# Patient Record
Sex: FEMALE | Race: WHITE | NOT HISPANIC OR LATINO | Employment: OTHER | URBAN - METROPOLITAN AREA
[De-identification: names, ages, dates, MRNs, and addresses within clinical notes are randomized per-mention and may not be internally consistent; named-entity substitution may affect disease eponyms.]

---

## 2018-03-17 ENCOUNTER — OFFICE VISIT (OUTPATIENT)
Dept: URGENT CARE | Facility: MEDICAL CENTER | Age: 78
End: 2018-03-17
Payer: COMMERCIAL

## 2018-03-17 VITALS
SYSTOLIC BLOOD PRESSURE: 170 MMHG | OXYGEN SATURATION: 94 % | DIASTOLIC BLOOD PRESSURE: 89 MMHG | HEIGHT: 70 IN | BODY MASS INDEX: 25.48 KG/M2 | HEART RATE: 63 BPM | TEMPERATURE: 99.6 F | WEIGHT: 178 LBS
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
## 2018-03-17 DIAGNOSIS — S39.012A STRAIN OF MUSCLE, FASCIA AND TENDON OF LOWER BACK, INITIAL ENCOUNTER: Primary | ICD-10-CM

## 2018-03-17 PROCEDURE — 99283 EMERGENCY DEPT VISIT LOW MDM: CPT | Performed by: PHYSICIAN ASSISTANT

## 2018-03-17 PROCEDURE — G0382 LEV 3 HOSP TYPE B ED VISIT: HCPCS | Performed by: PHYSICIAN ASSISTANT

## 2018-03-17 RX ORDER — QUINAPRIL 10 MG/1
10 TABLET ORAL
COMMUNITY
End: 2022-02-04

## 2018-03-17 RX ORDER — PROPRANOLOL HYDROCHLORIDE 40 MG/1
40 TABLET ORAL 3 TIMES DAILY
COMMUNITY
End: 2022-02-04

## 2018-03-17 RX ORDER — DILTIAZEM HYDROCHLORIDE 360 MG/1
360 CAPSULE, EXTENDED RELEASE ORAL DAILY
Status: ON HOLD | COMMUNITY
End: 2022-02-07

## 2018-03-17 RX ORDER — HYDROCHLOROTHIAZIDE 25 MG/1
25 TABLET ORAL DAILY
COMMUNITY
End: 2022-02-04

## 2018-03-17 NOTE — PROGRESS NOTES
[address] [facility]        NAME: Brooke Packer is a 65 y o  female  : 1940    MRN: [id number]  DATE: 2018  TIME: 1:26 PM    Assessment and Plan   Strain of muscle, fascia and tendon of lower back, initial encounter [U31 268S]  1  Strain of muscle, fascia and tendon of lower back, initial encounter     2  Fall, initial encounter       Discussed at length the need for xray  Based on physical exam findings of no TTP and history of worsening pain as time goes on, discussed that this is most likely a muscular strain  Patient prefers to forego imaging at this time as she believes there isn't anything broken  Will proceed with supportive therapy  Patient Instructions     Use ibuprofen and tylenol as needed for pain  Moist heat to the area for 15-20 minutes 3-4 times daily for relief of tense muscles  Gentle massage to the affected area as tolerated  Follow up with PCP in 3-5 days  Proceed to  ER if symptoms worsen  Chief Complaint     Chief Complaint   Patient presents with   [facility] Fall     While walking pt fell, and stumbled down two steps, has pain in R lower back          History of Present Illness       This is a 65year old female presenting with her daughter for low back pain after a fall yesterday  She states that she stumbled and fell down 3 stairs, denies hitting her head, losing consciousness, nausea, or vomiting since  She states that she had an ache in her back initially but over time the pain is getting worse  The pain is on the right side of her back, dull/achy constantly and then sharp pain intermittently when she moves the wrong way  She has a history of low back pain due to osteoarthritis in the lumbar spine  She denies any pain in her legs, weakness, tingling, numbness, incontinence, or saddle anesthesia  She took an aspirin for her pain today   She states that she is able to take ibuprofen and tylenol, no one has ever told her not to and she denies any liver, kidney, or stomach problems  She states that sitting makes her pain worse at times, walking is uncomfortable but she is able to walk  Fall   The accident occurred 12 to 24 hours ago  The fall occurred while walking  She fell from a height of 1 to 2 ft  There was no blood loss  The point of impact was the buttocks  The pain is present in the back  The symptoms are aggravated by sitting  Pertinent negatives include no abdominal pain, bowel incontinence, fever, headaches, hearing loss, hematuria, loss of consciousness, nausea, numbness, tingling, visual change or vomiting  Review of Systems   Review of Systems   Constitutional: Negative for chills, fatigue and fever  HENT: Negative  Respiratory: Negative  Cardiovascular: Negative  Gastrointestinal: Negative for abdominal pain, bowel incontinence, nausea and vomiting  Genitourinary: Negative for hematuria  Musculoskeletal: Positive for back pain, gait problem and myalgias  Negative for joint swelling, neck pain and neck stiffness  Neurological: Negative for dizziness, tingling, loss of consciousness, numbness and headaches           Current Medications       Current Outpatient Prescriptions:     diltiazem (TIAZAC) 360 MG 24 hr capsule, Take 360 mg by mouth daily, Disp: , Rfl:     hydrochlorothiazide (HYDRODIURIL) 25 mg tablet, Take 25 mg by mouth daily, Disp: , Rfl:     propranolol (INDERAL) 40 mg tablet, Take 40 mg by mouth 3 (three) times a day, Disp: , Rfl:     quinapril (ACCUPRIL) 10 mg tablet, Take 10 mg by mouth daily at bedtime, Disp: , Rfl:     Current Allergies     Allergies as of 03/17/2018    (No Known Allergies)            The following portions of the patient's history were reviewed and updated as appropriate: allergies, current medications, past family history, past medical history, past social history, past surgical history and problem list      Past Medical History:   Diagnosis Date    Arthritis     Back pain     Glaucoma     Hypertension     Tremors of nervous system        Past Surgical History:   Procedure Laterality Date    HYSTERECTOMY         No family history on file  Medications have been verified  Objective   /89   Pulse 63   Temp 99 6 °F (37 6 °C) (Temporal)   Ht 5' 10" (1 778 m)   Wt 80 7 kg (178 lb)   SpO2 94%   BMI 25 54 kg/m²        Physical Exam     Physical Exam   Constitutional: She appears well-developed and well-nourished  No distress  Patient walking with antalgic gait   HENT:   Head: Normocephalic and atraumatic  Mouth/Throat: Oropharynx is clear and moist    Eyes: Conjunctivae and EOM are normal  Pupils are equal, round, and reactive to light  Neck: Normal range of motion  Neck supple  Cardiovascular: Normal rate, regular rhythm, normal heart sounds and intact distal pulses  Pulmonary/Chest: Effort normal and breath sounds normal  No respiratory distress  She has no wheezes  She has no rales  Musculoskeletal:   Low back: There is an area of ecchymosis on the left side of the patient's low back that they believe is new  No midline tenderness to palpation, no TTP over the ecchymosis, no right sided TTP  The patient states that palpation of the right sided paraspinal muscles feels good  No TTP of either hip joints  Decreased lumbar range of motion in all directions  Equal strength in bilateral extremities  Cap refill less than 3 seconds in bilateral legs  Patient unable to lay flat on table for SLR  Lymphadenopathy:     She has no cervical adenopathy  Neurological: She is alert  She displays normal reflexes  She exhibits normal muscle tone  Coordination normal    Skin: Skin is warm and dry  She is not diaphoretic  Psychiatric: She has a normal mood and affect   Her behavior is normal  Judgment and thought content normal

## 2018-03-17 NOTE — PATIENT INSTRUCTIONS
Use ibuprofen and tylenol as needed for pain  Moist heat to the area for 15-20 minutes 3-4 times daily for relief of tense muscles  Gentle massage to the affected area as tolerated  Follow up with your PCP for symptoms lasting longer than 7-10 days, or worsening symptoms  Go to the ER for any distress

## 2020-11-05 ENCOUNTER — NURSING HOME VISIT (OUTPATIENT)
Dept: FAMILY MEDICINE CLINIC | Facility: CLINIC | Age: 80
End: 2020-11-05
Payer: COMMERCIAL

## 2020-11-05 VITALS
HEIGHT: 65 IN | SYSTOLIC BLOOD PRESSURE: 116 MMHG | TEMPERATURE: 96.5 F | WEIGHT: 122 LBS | HEART RATE: 82 BPM | BODY MASS INDEX: 20.33 KG/M2 | DIASTOLIC BLOOD PRESSURE: 68 MMHG

## 2020-11-05 DIAGNOSIS — H40.9 GLAUCOMA OF BOTH EYES, UNSPECIFIED GLAUCOMA TYPE: ICD-10-CM

## 2020-11-05 DIAGNOSIS — F02.80 ALZHEIMER'S DEMENTIA WITHOUT BEHAVIORAL DISTURBANCE, UNSPECIFIED TIMING OF DEMENTIA ONSET (HCC): ICD-10-CM

## 2020-11-05 DIAGNOSIS — Z00.00 MEDICARE ANNUAL WELLNESS VISIT, SUBSEQUENT: Primary | ICD-10-CM

## 2020-11-05 DIAGNOSIS — G30.9 ALZHEIMER'S DEMENTIA WITHOUT BEHAVIORAL DISTURBANCE, UNSPECIFIED TIMING OF DEMENTIA ONSET (HCC): ICD-10-CM

## 2020-11-05 PROCEDURE — 99213 OFFICE O/P EST LOW 20 MIN: CPT | Performed by: FAMILY MEDICINE

## 2020-11-05 PROCEDURE — G0439 PPPS, SUBSEQ VISIT: HCPCS | Performed by: FAMILY MEDICINE

## 2021-05-27 ENCOUNTER — NURSING HOME VISIT (OUTPATIENT)
Dept: FAMILY MEDICINE CLINIC | Facility: CLINIC | Age: 81
End: 2021-05-27
Payer: COMMERCIAL

## 2021-05-27 DIAGNOSIS — F02.80 ALZHEIMER'S DEMENTIA WITHOUT BEHAVIORAL DISTURBANCE, UNSPECIFIED TIMING OF DEMENTIA ONSET (HCC): Primary | ICD-10-CM

## 2021-05-27 DIAGNOSIS — G30.9 ALZHEIMER'S DEMENTIA WITHOUT BEHAVIORAL DISTURBANCE, UNSPECIFIED TIMING OF DEMENTIA ONSET (HCC): Primary | ICD-10-CM

## 2021-05-27 DIAGNOSIS — M54.50 CHRONIC BILATERAL LOW BACK PAIN WITHOUT SCIATICA: ICD-10-CM

## 2021-05-27 DIAGNOSIS — G89.29 CHRONIC BILATERAL LOW BACK PAIN WITHOUT SCIATICA: ICD-10-CM

## 2021-05-27 PROCEDURE — 99335 PR DOM/R-HOME E/M EST PT LW MOD SEVERITY 25 MINUTES: CPT | Performed by: FAMILY MEDICINE

## 2021-06-04 NOTE — PROGRESS NOTES
Falls Plan of Care: balance, strength, and gait training instructions were provided  Home safety education provided  Assessment/Plan:         Problem List Items Addressed This Visit        Nervous and Auditory    Alzheimer's dementia without behavioral disturbance ([facility] [address] ) - Primary     Patient is stable  and will continue present plan of care and reassess at next routine visit  All questions about this problem from patient were answered today  Other    Back pain     Will DC Tylenol  No  3 and started on tramadol 50 mg q i d  p r n  patient did not appear to be having any pain right now I saw her in the morning staff and power-of- says she has pain at night time  I discussed this with her power of  her daughter and she is in agreement with                 Subjective:      Patient ID: Hattie High is a 79 y o  female  This is an 54-year-old white female seen examined at the assisted living facility  Patient is pleasantly confused with her dementia however are reported from her daughter that she is having pain at night  Patient has chronic back pain and usually takes Tylenol 3 as needed  Will DC her Tylenol No  3 and will start her on tramadol 50 mg 4 times a day as needed  The following portions of the patient's history were reviewed and updated as appropriate:   Past Medical History:  She has a past medical history of Arthritis, Back pain, Glaucoma, Hypertension, and Tremors of nervous system  ,  _______________________________________________________________________  Medical Problems:  does not have any pertinent problems on file ,  _______________________________________________________________________  Past Surgical History:   has a past surgical history that includes Hysterectomy  ,  _______________________________________________________________________  Family History:  family history is not on file ,  _______________________________________________________________________  Social History:   reports that she has been smoking  She has never used smokeless tobacco  She reports previous alcohol use  She reports that she does not use drugs  ,  _______________________________________________________________________  Allergies:  has No Known Allergies     _______________________________________________________________________  Current Outpatient Medications   Medication Sig Dispense Refill    diltiazem (TIAZAC) 360 MG 24 hr capsule Take 360 mg by mouth daily      hydrochlorothiazide (HYDRODIURIL) 25 mg tablet Take 25 mg by mouth daily      propranolol (INDERAL) 40 mg tablet Take 40 mg by mouth 3 (three) times a day      quinapril (ACCUPRIL) 10 mg tablet Take 10 mg by mouth daily at bedtime       No current facility-administered medications for this visit       _______________________________________________________________________  Review of Systems   Constitutional: Negative for activity change, appetite change, fatigue and fever  HENT: Negative for congestion, ear pain, postnasal drip, rhinorrhea, sinus pressure, sinus pain, sneezing and sore throat  Eyes: Negative for pain and redness  Respiratory: Negative for apnea, cough, chest tightness, shortness of breath and wheezing  Cardiovascular: Negative for chest pain, palpitations and leg swelling  Gastrointestinal: Negative for abdominal pain, constipation, diarrhea, nausea and vomiting  Endocrine: Negative for cold intolerance and heat intolerance  Genitourinary: Negative for difficulty urinating, dysuria, frequency, hematuria and urgency  Musculoskeletal: Negative for arthralgias, gait problem and myalgias  Skin: Negative for rash  Neurological: Negative for dizziness, speech difficulty, weakness, numbness and headaches  Hematological: Does not bruise/bleed easily  Psychiatric/Behavioral: Positive for confusion   Negative for agitation and hallucinations  Objective: There were no vitals filed for this visit  There is no height or weight on file to calculate BMI  Physical Exam  Vitals signs and nursing note reviewed  Constitutional:       Appearance: She is well-developed  HENT:      Head: Normocephalic and atraumatic  Nose: Nose normal       Mouth/Throat:      Mouth: Mucous membranes are moist    Eyes:      General: No scleral icterus  Conjunctiva/sclera: Conjunctivae normal       Pupils: Pupils are equal, round, and reactive to light  Neck:      Musculoskeletal: Normal range of motion and neck supple  Thyroid: No thyromegaly  Cardiovascular:      Rate and Rhythm: Normal rate and regular rhythm  Pulmonary:      Effort: Pulmonary effort is normal       Breath sounds: Normal breath sounds  No wheezing  Abdominal:      General: Bowel sounds are normal  There is no distension  Palpations: Abdomen is soft  Tenderness: There is no abdominal tenderness  There is no guarding or rebound  Musculoskeletal: Normal range of motion  General: No tenderness or deformity  Skin:     General: Skin is warm and dry  Findings: No erythema or rash  Neurological:      Mental Status: She is alert  Mental status is at baseline  She is disoriented  Sensory: No sensory deficit     Psychiatric:         Behavior: Behavior normal

## 2021-06-04 NOTE — ASSESSMENT & PLAN NOTE
Gabriel WAGNER Tylenol  No  3 and started on tramadol 50 mg q i d  p r n  patient did not appear to be having any pain right now I saw her in the morning staff and power-of- says she has pain at night time    I discussed this with her power of  her daughter and she is in agreement with

## 2021-07-31 ENCOUNTER — TELEPHONE (OUTPATIENT)
Dept: OTHER | Facility: OTHER | Age: 81
End: 2021-07-31

## 2021-07-31 NOTE — TELEPHONE ENCOUNTER
Julia from [facility] called requesting a call back from on call provider regarding pt's labs results

## 2021-07-31 NOTE — TELEPHONE ENCOUNTER
Tech from [facility] calling in Stat lab results, stated "UA with microscopic, level out of range is blood at 0 03-0 19, results will also be faxed to the office"  Information relayed to on call provider via TC

## 2022-02-04 ENCOUNTER — APPOINTMENT (EMERGENCY)
Dept: RADIOLOGY | Facility: HOSPITAL | Age: 82
End: 2022-02-04
Payer: COMMERCIAL

## 2022-02-04 ENCOUNTER — HOSPITAL ENCOUNTER (INPATIENT)
Facility: HOSPITAL | Age: 82
LOS: 6 days | Discharge: NURSING, REHAB OR LONG-TERM CARE | DRG: 481 | End: 2022-02-10
Attending: STUDENT IN AN ORGANIZED HEALTH CARE EDUCATION/TRAINING PROGRAM | Admitting: HOSPITALIST
Payer: COMMERCIAL

## 2022-02-04 ENCOUNTER — HOSPITAL ENCOUNTER (EMERGENCY)
Facility: HOSPITAL | Age: 82
End: 2022-02-04
Attending: INTERNAL MEDICINE | Admitting: INTERNAL MEDICINE
Payer: COMMERCIAL

## 2022-02-04 ENCOUNTER — APPOINTMENT (EMERGENCY)
Dept: CT IMAGING | Facility: HOSPITAL | Age: 82
End: 2022-02-04
Payer: COMMERCIAL

## 2022-02-04 VITALS
BODY MASS INDEX: 22.49 KG/M2 | OXYGEN SATURATION: 97 % | DIASTOLIC BLOOD PRESSURE: 74 MMHG | TEMPERATURE: 97.3 F | RESPIRATION RATE: 18 BRPM | HEIGHT: 65 IN | WEIGHT: 135 LBS | SYSTOLIC BLOOD PRESSURE: 161 MMHG | HEART RATE: 98 BPM

## 2022-02-04 DIAGNOSIS — Z01.818 PRE-OPERATIVE CLEARANCE: ICD-10-CM

## 2022-02-04 DIAGNOSIS — S72.001A CLOSED FRACTURE OF RIGHT HIP, INITIAL ENCOUNTER (HCC): ICD-10-CM

## 2022-02-04 DIAGNOSIS — I50.42 CHRONIC COMBINED SYSTOLIC AND DIASTOLIC CHF (CONGESTIVE HEART FAILURE) (HCC): ICD-10-CM

## 2022-02-04 DIAGNOSIS — N18.31 STAGE 3A CHRONIC KIDNEY DISEASE (HCC): ICD-10-CM

## 2022-02-04 DIAGNOSIS — S72.009A HIP FRACTURE (HCC): Primary | ICD-10-CM
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
## 2022-02-04 DIAGNOSIS — I10 ESSENTIAL HYPERTENSION: ICD-10-CM

## 2022-02-04 LAB
ABO GROUP BLD: NORMAL
ALBUMIN SERPL BCP-MCNC: 4.3 G/DL (ref 3.4–4.8)
ALP SERPL-CCNC: 76.8 U/L (ref 35–140)
ALT SERPL W P-5'-P-CCNC: 18 U/L (ref 5–54)
ANION GAP SERPL CALCULATED.3IONS-SCNC: 15 MMOL/L (ref 4–13)
AST SERPL W P-5'-P-CCNC: 97 U/L (ref 15–41)
ATRIAL RATE: 71 BPM
ATRIAL RATE: 72 BPM
BASOPHILS # BLD AUTO: 0.04 THOUSANDS/ΜL (ref 0–0.1)
BASOPHILS NFR BLD AUTO: 0 % (ref 0–1)
BILIRUB SERPL-MCNC: 1.07 MG/DL (ref 0.3–1.2)
BLD GP AB SCN SERPL QL: NEGATIVE
BUN SERPL-MCNC: 23 MG/DL (ref 6–20)
CALCIUM SERPL-MCNC: 9.9 MG/DL (ref 8.4–10.2)
CARDIAC TROPONIN I PNL SERPL HS: 17 NG/L (ref 8–18)
CHLORIDE SERPL-SCNC: 101 MMOL/L (ref 96–108)
CK SERPL-CCNC: 129 U/L (ref 38–234)
CO2 SERPL-SCNC: 21 MMOL/L (ref 22–33)
CREAT SERPL-MCNC: 1.36 MG/DL (ref 0.4–1.1)
EOSINOPHIL # BLD AUTO: 0.01 THOUSAND/ΜL (ref 0–0.61)
EOSINOPHIL NFR BLD AUTO: 0 % (ref 0–6)
ERYTHROCYTE [DISTWIDTH] IN BLOOD BY AUTOMATED COUNT: 13.2 % (ref 11.6–15.1)
FLUAV RNA RESP QL NAA+PROBE: NEGATIVE
FLUBV RNA RESP QL NAA+PROBE: NEGATIVE
GFR SERPL CREATININE-BSD FRML MDRD: 36 ML/MIN/1.73SQ M
GLUCOSE SERPL-MCNC: 160 MG/DL (ref 65–140)
HCT VFR BLD AUTO: 42.8 % (ref 34.8–46.1)
HGB BLD-MCNC: 14.5 G/DL (ref 11.5–15.4)
IMM GRANULOCYTES # BLD AUTO: 0.08 THOUSAND/UL (ref 0–0.2)
IMM GRANULOCYTES NFR BLD AUTO: 1 % (ref 0–2)
INR PPP: 1.03 (ref 0.84–1.19)
LYMPHOCYTES # BLD AUTO: 0.33 THOUSANDS/ΜL (ref 0.6–4.47)
LYMPHOCYTES NFR BLD AUTO: 2 % (ref 14–44)
MCH RBC QN AUTO: 30.3 PG (ref 26.8–34.3)
MCHC RBC AUTO-ENTMCNC: 33.9 G/DL (ref 31.4–37.4)
MCV RBC AUTO: 89 FL (ref 82–98)
MONOCYTES # BLD AUTO: 1.14 THOUSAND/ΜL (ref 0.17–1.22)
MONOCYTES NFR BLD AUTO: 8 % (ref 4–12)
NEUTROPHILS # BLD AUTO: 12.4 THOUSANDS/ΜL (ref 1.85–7.62)
NEUTS SEG NFR BLD AUTO: 89 % (ref 43–75)
NRBC BLD AUTO-RTO: 0 /100 WBCS
P AXIS: -31 DEGREES
P AXIS: 78 DEGREES
PLATELET # BLD AUTO: 221 THOUSANDS/UL (ref 149–390)
PMV BLD AUTO: 9.6 FL (ref 8.9–12.7)
POTASSIUM SERPL-SCNC: 3.5 MMOL/L (ref 3.5–5)
PR INTERVAL: 176 MS
PR INTERVAL: 177 MS
PROT SERPL-MCNC: 7.8 G/DL (ref 6.4–8.3)
PROTHROMBIN TIME: 13.4 SECONDS (ref 11.6–14.5)
QRS AXIS: -31 DEGREES
QRS AXIS: 103 DEGREES
QRSD INTERVAL: 100 MS
QRSD INTERVAL: 141 MS
QT INTERVAL: 405 MS
QT INTERVAL: 424 MS
QTC INTERVAL: 444 MS
QTC INTERVAL: 461 MS
RBC # BLD AUTO: 4.79 MILLION/UL (ref 3.81–5.12)
RH BLD: POSITIVE
RSV RNA RESP QL NAA+PROBE: NEGATIVE
SARS-COV-2 RNA RESP QL NAA+PROBE: NEGATIVE
SODIUM SERPL-SCNC: 137 MMOL/L (ref 133–145)
SPECIMEN EXPIRATION DATE: NORMAL
T WAVE AXIS: -34 DEGREES
T WAVE AXIS: 88 DEGREES
VENTRICULAR RATE: 71 BPM
VENTRICULAR RATE: 72 BPM
WBC # BLD AUTO: 14 THOUSAND/UL (ref 4.31–10.16)

## 2022-02-04 PROCEDURE — 96361 HYDRATE IV INFUSION ADD-ON: CPT

## 2022-02-04 PROCEDURE — 99285 EMERGENCY DEPT VISIT HI MDM: CPT

## 2022-02-04 PROCEDURE — 73552 X-RAY EXAM OF FEMUR 2/>: CPT

## 2022-02-04 PROCEDURE — 99222 1ST HOSP IP/OBS MODERATE 55: CPT | Performed by: HOSPITALIST

## 2022-02-04 PROCEDURE — 82550 ASSAY OF CK (CPK): CPT | Performed by: INTERNAL MEDICINE

## 2022-02-04 PROCEDURE — 96372 THER/PROPH/DIAG INJ SC/IM: CPT

## 2022-02-04 PROCEDURE — 70450 CT HEAD/BRAIN W/O DYE: CPT

## 2022-02-04 PROCEDURE — 96375 TX/PRO/DX INJ NEW DRUG ADDON: CPT

## 2022-02-04 PROCEDURE — 73502 X-RAY EXAM HIP UNI 2-3 VIEWS: CPT

## 2022-02-04 PROCEDURE — G1004 CDSM NDSC: HCPCS

## 2022-02-04 PROCEDURE — 93005 ELECTROCARDIOGRAM TRACING: CPT

## 2022-02-04 PROCEDURE — 99285 EMERGENCY DEPT VISIT HI MDM: CPT | Performed by: INTERNAL MEDICINE

## 2022-02-04 PROCEDURE — 36415 COLL VENOUS BLD VENIPUNCTURE: CPT | Performed by: INTERNAL MEDICINE

## 2022-02-04 PROCEDURE — 86900 BLOOD TYPING SEROLOGIC ABO: CPT | Performed by: INTERNAL MEDICINE

## 2022-02-04 PROCEDURE — 71045 X-RAY EXAM CHEST 1 VIEW: CPT

## 2022-02-04 PROCEDURE — 86850 RBC ANTIBODY SCREEN: CPT | Performed by: INTERNAL MEDICINE

## 2022-02-04 PROCEDURE — 86901 BLOOD TYPING SEROLOGIC RH(D): CPT | Performed by: INTERNAL MEDICINE

## 2022-02-04 PROCEDURE — 0241U HB NFCT DS VIR RESP RNA 4 TRGT: CPT | Performed by: INTERNAL MEDICINE

## 2022-02-04 PROCEDURE — 96374 THER/PROPH/DIAG INJ IV PUSH: CPT

## 2022-02-04 PROCEDURE — 80053 COMPREHEN METABOLIC PANEL: CPT | Performed by: INTERNAL MEDICINE

## 2022-02-04 PROCEDURE — 93010 ELECTROCARDIOGRAM REPORT: CPT | Performed by: INTERNAL MEDICINE

## 2022-02-04 PROCEDURE — 85025 COMPLETE CBC W/AUTO DIFF WBC: CPT | Performed by: INTERNAL MEDICINE

## 2022-02-04 PROCEDURE — 84484 ASSAY OF TROPONIN QUANT: CPT | Performed by: INTERNAL MEDICINE

## 2022-02-04 PROCEDURE — 85610 PROTHROMBIN TIME: CPT | Performed by: INTERNAL MEDICINE

## 2022-02-04 RX ORDER — HYDRALAZINE HYDROCHLORIDE 20 MG/ML
5 INJECTION INTRAMUSCULAR; INTRAVENOUS EVERY 4 HOURS PRN
Status: DISCONTINUED | OUTPATIENT
Start: 2022-02-04 | End: 2022-02-10 | Stop reason: HOSPADM

## 2022-02-04 RX ORDER — METOPROLOL SUCCINATE 25 MG/1
25 TABLET, EXTENDED RELEASE ORAL DAILY
Status: DISCONTINUED | OUTPATIENT
Start: 2022-02-05 | End: 2022-02-10 | Stop reason: HOSPADM

## 2022-02-04 RX ORDER — SODIUM CHLORIDE 9 MG/ML
125 INJECTION, SOLUTION INTRAVENOUS CONTINUOUS
Status: DISCONTINUED | OUTPATIENT
Start: 2022-02-04 | End: 2022-02-04 | Stop reason: HOSPADM

## 2022-02-04 RX ORDER — AMLODIPINE BESYLATE 2.5 MG/1
2.5 TABLET ORAL DAILY
COMMUNITY
End: 2022-02-10 | Stop reason: HOSPADM

## 2022-02-04 RX ORDER — DONEPEZIL HYDROCHLORIDE 5 MG/1
10 TABLET, FILM COATED ORAL
Status: DISCONTINUED | OUTPATIENT
Start: 2022-02-04 | End: 2022-02-10 | Stop reason: HOSPADM

## 2022-02-04 RX ORDER — FENTANYL CITRATE 50 UG/ML
1 INJECTION, SOLUTION INTRAMUSCULAR; INTRAVENOUS ONCE
Status: COMPLETED | OUTPATIENT
Start: 2022-02-04 | End: 2022-02-04

## 2022-02-04 RX ORDER — FUROSEMIDE 20 MG/1
20 TABLET ORAL DAILY
COMMUNITY
End: 2022-02-10 | Stop reason: HOSPADM

## 2022-02-04 RX ORDER — DILTIAZEM HYDROCHLORIDE 180 MG/1
360 CAPSULE, COATED, EXTENDED RELEASE ORAL DAILY
Status: DISCONTINUED | OUTPATIENT
Start: 2022-02-05 | End: 2022-02-04

## 2022-02-04 RX ORDER — AMLODIPINE BESYLATE 2.5 MG/1
2.5 TABLET ORAL DAILY
Status: DISCONTINUED | OUTPATIENT
Start: 2022-02-05 | End: 2022-02-04

## 2022-02-04 RX ORDER — OXYCODONE HYDROCHLORIDE 5 MG/1
2.5 TABLET ORAL EVERY 4 HOURS PRN
Status: DISCONTINUED | OUTPATIENT
Start: 2022-02-04 | End: 2022-02-10 | Stop reason: HOSPADM

## 2022-02-04 RX ORDER — HALOPERIDOL 5 MG/ML
2.5 INJECTION INTRAMUSCULAR ONCE
Status: COMPLETED | OUTPATIENT
Start: 2022-02-04 | End: 2022-02-04

## 2022-02-04 RX ORDER — FENTANYL CITRATE 50 UG/ML
50 INJECTION, SOLUTION INTRAMUSCULAR; INTRAVENOUS ONCE
Status: COMPLETED | OUTPATIENT
Start: 2022-02-04 | End: 2022-02-04

## 2022-02-04 RX ORDER — ACETAMINOPHEN 325 MG/1
975 TABLET ORAL ONCE
Status: DISCONTINUED | OUTPATIENT
Start: 2022-02-04 | End: 2022-02-04 | Stop reason: HOSPADM

## 2022-02-04 RX ORDER — SODIUM CHLORIDE, SODIUM LACTATE, POTASSIUM CHLORIDE, CALCIUM CHLORIDE 600; 310; 30; 20 MG/100ML; MG/100ML; MG/100ML; MG/100ML
50 INJECTION, SOLUTION INTRAVENOUS CONTINUOUS
Status: DISCONTINUED | OUTPATIENT
Start: 2022-02-05 | End: 2022-02-05

## 2022-02-04 RX ORDER — POTASSIUM CHLORIDE 750 MG/1
10 TABLET, EXTENDED RELEASE ORAL DAILY
COMMUNITY
End: 2022-02-10 | Stop reason: HOSPADM

## 2022-02-04 RX ORDER — OXYCODONE HYDROCHLORIDE 5 MG/1
5 TABLET ORAL EVERY 4 HOURS PRN
Status: DISCONTINUED | OUTPATIENT
Start: 2022-02-04 | End: 2022-02-10 | Stop reason: HOSPADM

## 2022-02-04 RX ORDER — TIMOLOL MALEATE 5 MG/ML
1 SOLUTION/ DROPS OPHTHALMIC 2 TIMES DAILY
COMMUNITY

## 2022-02-04 RX ORDER — DONEPEZIL HYDROCHLORIDE 10 MG/1
10 TABLET, FILM COATED ORAL
COMMUNITY

## 2022-02-04 RX ORDER — OXYCODONE HYDROCHLORIDE 5 MG/1
5 TABLET ORAL EVERY 4 HOURS PRN
COMMUNITY
End: 2022-02-10 | Stop reason: HOSPADM

## 2022-02-04 RX ORDER — SERTRALINE HYDROCHLORIDE 100 MG/1
100 TABLET, FILM COATED ORAL DAILY
COMMUNITY

## 2022-02-04 RX ORDER — METOPROLOL SUCCINATE 25 MG/1
25 TABLET, EXTENDED RELEASE ORAL DAILY
COMMUNITY

## 2022-02-04 RX ORDER — HEPARIN SODIUM 5000 [USP'U]/ML
5000 INJECTION, SOLUTION INTRAVENOUS; SUBCUTANEOUS EVERY 8 HOURS SCHEDULED
Status: DISCONTINUED | OUTPATIENT
Start: 2022-02-04 | End: 2022-02-05 | Stop reason: SDUPTHER

## 2022-02-04 RX ORDER — LATANOPROST 50 UG/ML
1 SOLUTION/ DROPS OPHTHALMIC
Status: DISCONTINUED | OUTPATIENT
Start: 2022-02-04 | End: 2022-02-10 | Stop reason: HOSPADM

## 2022-02-04 RX ORDER — PANTOPRAZOLE SODIUM 40 MG/1
40 TABLET, DELAYED RELEASE ORAL DAILY
COMMUNITY

## 2022-02-04 RX ORDER — SERTRALINE HYDROCHLORIDE 100 MG/1
100 TABLET, FILM COATED ORAL DAILY
Status: DISCONTINUED | OUTPATIENT
Start: 2022-02-05 | End: 2022-02-10 | Stop reason: HOSPADM

## 2022-02-04 RX ORDER — PANTOPRAZOLE SODIUM 40 MG/1
40 TABLET, DELAYED RELEASE ORAL
Status: DISCONTINUED | OUTPATIENT
Start: 2022-02-05 | End: 2022-02-10 | Stop reason: HOSPADM

## 2022-02-04 RX ORDER — SACUBITRIL AND VALSARTAN 24; 26 MG/1; MG/1
1 TABLET, FILM COATED ORAL 2 TIMES DAILY
Status: ON HOLD | COMMUNITY
End: 2022-02-10 | Stop reason: SDUPTHER

## 2022-02-04 RX ORDER — POTASSIUM CHLORIDE 750 MG/1
10 TABLET, EXTENDED RELEASE ORAL 2 TIMES DAILY
Status: DISCONTINUED | OUTPATIENT
Start: 2022-02-04 | End: 2022-02-09

## 2022-02-04 RX ORDER — LATANOPROST 50 UG/ML
1 SOLUTION/ DROPS OPHTHALMIC
Status: ON HOLD | COMMUNITY
End: 2022-02-07

## 2022-02-04 RX ORDER — QUETIAPINE FUMARATE 25 MG/1
25 TABLET, FILM COATED ORAL 2 TIMES DAILY
COMMUNITY

## 2022-02-04 RX ORDER — QUETIAPINE FUMARATE 25 MG/1
25 TABLET, FILM COATED ORAL 2 TIMES DAILY
Status: DISCONTINUED | OUTPATIENT
Start: 2022-02-04 | End: 2022-02-10 | Stop reason: HOSPADM

## 2022-02-04 RX ORDER — TIMOLOL MALEATE 5 MG/ML
1 SOLUTION/ DROPS OPHTHALMIC 2 TIMES DAILY
Status: DISCONTINUED | OUTPATIENT
Start: 2022-02-04 | End: 2022-02-10 | Stop reason: HOSPADM

## 2022-02-04 RX ADMIN — QUETIAPINE FUMARATE 25 MG: 25 TABLET ORAL at 23:31

## 2022-02-04 RX ADMIN — LATANOPROST 1 DROP: 50 SOLUTION/ DROPS OPHTHALMIC at 23:30

## 2022-02-04 RX ADMIN — OXYCODONE HYDROCHLORIDE 5 MG: 5 TABLET ORAL at 23:31

## 2022-02-04 RX ADMIN — HYDRALAZINE HYDROCHLORIDE 5 MG: 20 INJECTION, SOLUTION INTRAMUSCULAR; INTRAVENOUS at 23:30

## 2022-02-04 RX ADMIN — HEPARIN SODIUM 5000 UNITS: 5000 INJECTION INTRAVENOUS; SUBCUTANEOUS at 23:30

## 2022-02-04 RX ADMIN — MORPHINE SULFATE 2 MG: 2 INJECTION, SOLUTION INTRAMUSCULAR; INTRAVENOUS at 10:52

## 2022-02-04 RX ADMIN — DONEPEZIL HYDROCHLORIDE 10 MG: 5 TABLET, FILM COATED ORAL at 23:30

## 2022-02-04 RX ADMIN — TIMOLOL MALEATE 1 DROP: 5 SOLUTION/ DROPS OPHTHALMIC at 23:30

## 2022-02-04 RX ADMIN — POTASSIUM CHLORIDE 10 MEQ: 750 TABLET, EXTENDED RELEASE ORAL at 23:31

## 2022-02-04 RX ADMIN — SODIUM CHLORIDE, SODIUM LACTATE, POTASSIUM CHLORIDE, AND CALCIUM CHLORIDE 50 ML/HR: .6; .31; .03; .02 INJECTION, SOLUTION INTRAVENOUS at 23:26

## 2022-02-04 RX ADMIN — SACUBITRIL AND VALSARTAN 1 TABLET: 24; 26 TABLET, FILM COATED ORAL at 23:29

## 2022-02-04 RX ADMIN — SODIUM CHLORIDE 125 ML/HR: 0.9 INJECTION, SOLUTION INTRAVENOUS at 10:52

## 2022-02-04 RX ADMIN — HALOPERIDOL LACTATE 2.5 MG: 5 INJECTION, SOLUTION INTRAMUSCULAR at 17:25

## 2022-02-04 RX ADMIN — FENTANYL CITRATE 50 MCG: 50 INJECTION INTRAMUSCULAR; INTRAVENOUS at 16:58

## 2022-02-04 NOTE — EMTALA/ACUTE CARE TRANSFER
[facility] EMERGENCY DEPARTMENT  [address] [facility] [address]  Dept: [phone]      EMTALA TRANSFER CONSENT    NAME Kamran Sal                                         1940                              MRN [id number]    I have been informed of my rights regarding examination, treatment, and transfer   by Dr Hannah Gonzalez MD    Benefits: Specialized equipment and/or services available at the receiving facility (Include comment)________________________    Risks: Potential for delay in receiving treatment,Potential deterioration of medical condition,Loss of IV,Increased discomfort during transfer,Possible worsening of condition or death during transfer      Transfer Request   I acknowledge that my medical condition has been evaluated and explained to me by the emergency department physician or other qualified medical person and/or my attending physician who has recommended and offered to me further medical examination and treatment  I understand the Hospital's obligation with respect to the treatment and stabilization of my emergency medical condition  I nevertheless request to be transferred  I release the Hospital, the doctor, and any other persons caring for me from all responsibility or liability for any injury or ill effects that may result from my transfer and agree to accept all responsibility for the consequences of my choice to transfer, rather than receive stabilizing treatment at the Hospital  I understand that because the transfer is my request, my insurance may not provide reimbursement for the services  The Hospital will assist and direct me and my family in how to make arrangements for transfer, but the hospital is not liable for any fees charged by the transport service    In spite of this understanding, I refuse to consent to further medical examination and treatment which has been offered to me, and request transfer to  [facility] [address] : Judy Christa  I authorize the performance of emergency medical procedures and treatments upon me in both transit and upon arrival at the receiving facility  Additionally, I authorize the release of any and all medical records to the receiving facility and request they be transported with me, if possible  I authorize the performance of emergency medical procedures and treatments upon me in both transit and upon arrival at the receiving facility  Additionally, I authorize the release of any and all medical records to the receiving facility and request they be transported with me, if possible  I understand that the safest mode of transportation during a medical emergency is an ambulance and that the Hospital advocates the use of this mode of transport  Risks of traveling to the receiving facility by car, including absence of medical control, life sustaining equipment, such as oxygen, and medical personnel has been explained to me and I fully understand them  (SALINAS CORRECT BOX BELOW)  [  ]  I consent to the stated transfer and to be transported by ambulance/helicopter  [  ]  I consent to the stated transfer, but refuse transportation by ambulance and accept full responsibility for my transportation by car  I understand the risks of non-ambulance transfers and I exonerate the Hospital and its staff from any deterioration in my condition that results from this refusal     X___________________________________________    DATE  22  TIME________  Signature of patient or legally responsible individual signing on patient behalf           RELATIONSHIP TO PATIENT_________________________          Provider Certification    NAME Estela Brambila                                        [facility] 1940                              MRN [id number]    A medical screening exam was performed on the above named patient    Based on the examination:    Condition Necessitating Transfer The primary encounter diagnosis was Fall, initial encounter  A diagnosis of Closed fracture of right hip, initial encounter [facility]) was also pertinent to this visit  Patient Condition: The patient has been stabilized such that within reasonable medical probability, no material deterioration of the patient condition or the condition of the unborn child(zachary) is likely to result from the transfer    Reason for Transfer: Level of Care needed not available at this facility    Transfer Requirements: [address]   · Space available and qualified personnel available for treatment as acknowledged by    · Agreed to accept transfer and to provide appropriate medical treatment as acknowledged by       Dr Robert Hernandez  · Appropriate medical records of the examination and treatment of the patient are provided at the time of transfer   [address] _______  · Transfer will be performed by qualified personnel from    and appropriate transfer equipment as required, including the use of necessary and appropriate life support measures      Provider Certification: I have examined the patient and explained the following risks and benefits of being transferred/refusing transfer to the patient/family:  General risk, such as traffic hazards, adverse weather conditions, rough terrain or turbulence, possible failure of equipment (including vehicle or aircraft), or consequences of actions of persons outside the control of the transport personnel,Unanticipated needs of medical equipment and personnel during transport,Risk of worsening condition,The possibility of a transport vehicle being unavailable      Based on these reasonable risks and benefits to the patient and/or the unborn child(zachary), and based upon the information available at the time of the patients examination, I certify that the medical benefits reasonably to be expected from the provision of appropriate medical treatments at another medical facility outweigh the increasing risks, if any, to the individuals medical condition, and in the case of labor to the unborn child, from effecting the transfer      X____________________________________________ DATE 02/04/22        TIME_______      ORIGINAL - SEND TO MEDICAL RECORDS   COPY - SEND WITH PATIENT DURING TRANSFER

## 2022-02-04 NOTE — ED PROVIDER NOTES
History  Chief Complaint   Patient presents with   Colleen Holland     arrived via [facility]  as per EMS pt is from a Assisted Living  seen by staff at [phone] last evening and when staff went to check on her for breakfast this am, she was found on the floor next to her couch  right leg shortened and rotated  Blood sugar noted to be 44 upon EMS arrival, given oral glucose and repeat BS was 218  This is an  79years old brought from assisted living at the patient was found on the floor next to the couch  According to the EMS they found have a blood sugar of 44 and patient was giving glucose was and blood sugar go up to 217  Patient is not diabetic and she does not take medication for diabetes  Patient is demented and cannot give any history  Patient has shortening of the right lower extremity was external rotation  Which is suspected that it may be having right hip fracture  Patient has no fever  Patient is awake alert but she is confused  Patient has history of hypertension, depression, cardiac history, glaucoma  We do know for how long patient was on the floor  And no other communication with the patient  Prior to Admission Medications   Prescriptions Last Dose Informant Patient Reported? Taking?    QUEtiapine (SEROquel) 25 mg tablet 2/3/2022 at Unknown time  Yes Yes   Sig: Take 25 mg by mouth 2 (two) times a day   amLODIPine (NORVASC) 2 5 mg tablet 2/3/2022 at Unknown time  Yes Yes   Sig: Take 2 5 mg by mouth daily   diltiazem (TIAZAC) 360 MG 24 hr capsule  Self Yes No   Sig: Take 360 mg by mouth daily   Patient not taking: Reported on 2/4/2022    donepezil (ARICEPT) 10 mg tablet 2/3/2022 at Unknown time  Yes Yes   Sig: Take 10 mg by mouth daily at bedtime   furosemide (LASIX) 20 mg tablet 2/3/2022 at Unknown time  Yes Yes   Sig: Take 20 mg by mouth 2 (two) times a day   latanoprost (XALATAN) 0 005 % ophthalmic solution 2/3/2022 at Unknown time  Yes Yes   Sig: Administer 1 drop to both eyes daily at bedtime   metoprolol succinate (TOPROL-XL) 25 mg 24 hr tablet 2/3/2022 at Unknown time  Yes Yes   Sig: Take 25 mg by mouth daily   oxyCODONE (ROXICODONE) 5 immediate release tablet 2/3/2022 at Unknown time  Yes Yes   Sig: Take 5 mg by mouth every 4 (four) hours as needed for moderate pain   pantoprazole (PROTONIX) 40 mg tablet 2/3/2022 at Unknown time  Yes Yes   Sig: Take 40 mg by mouth daily   potassium chloride (K-DUR,KLOR-CON) 10 mEq tablet 2/3/2022 at Unknown time  Yes Yes   Sig: Take 10 mEq by mouth 2 (two) times a day   sacubitril-valsartan (Entresto) 24-26 MG TABS 2/3/2022 at Unknown time  Yes Yes   Sig: Take 1 tablet by mouth 2 (two) times a day   sertraline (ZOLOFT) 100 mg tablet 2/3/2022 at Unknown time  Yes Yes   Sig: Take 100 mg by mouth daily   timolol (TIMOPTIC) 0 5 % ophthalmic solution 2/3/2022 at Unknown time  Yes Yes   Si drop 2 (two) times a day      Facility-Administered Medications: None       Past Medical History:   Diagnosis Date    Arthritis     Back pain     Dementia ([facility] [address] )     Glaucoma     Hypertension     Psychiatric disorder     depression    Tremors of nervous system        Past Surgical History:   Procedure Laterality Date    HYSTERECTOMY         History reviewed  No pertinent family history  I have reviewed and agree with the history as documented  E-Cigarette/Vaping    E-Cigarette Use Never User      E-Cigarette/Vaping Substances     Social History     Tobacco Use    Smoking status: Current Some Day Smoker     Types: Cigarettes    Smokeless tobacco: Never Used   Vaping Use    Vaping Use: Never used   Substance Use Topics    Alcohol use: Not Currently    Drug use: Never       Review of Systems   Unable to perform ROS: Dementia       Physical Exam  Physical Exam  Constitutional:       General: She is in acute distress  Appearance: Normal appearance  She is well-developed  She is not ill-appearing, toxic-appearing or diaphoretic     HENT:      Head: Normocephalic and atraumatic  Right Ear: Tympanic membrane and ear canal normal       Left Ear: Tympanic membrane and ear canal normal       Nose: Nose normal  No congestion or rhinorrhea  Mouth/Throat:      Mouth: Mucous membranes are moist       Pharynx: No oropharyngeal exudate or posterior oropharyngeal erythema  Eyes:      General: No scleral icterus  Right eye: No discharge  Left eye: No discharge  Extraocular Movements: Extraocular movements intact  Conjunctiva/sclera: Conjunctivae normal       Pupils: Pupils are equal, round, and reactive to light  Neck:      Vascular: No carotid bruit  Cardiovascular:      Rate and Rhythm: Normal rate and regular rhythm  Pulses: Normal pulses  Heart sounds: Normal heart sounds  No murmur heard  No friction rub  No gallop  Pulmonary:      Effort: Pulmonary effort is normal  No respiratory distress  Breath sounds: Normal breath sounds  No stridor  No wheezing, rhonchi or rales  Chest:      Chest wall: No tenderness  Abdominal:      General: Bowel sounds are normal  There is no distension  Palpations: Abdomen is soft  There is no mass  Tenderness: There is no abdominal tenderness  There is no right CVA tenderness, left CVA tenderness, guarding or rebound  Hernia: No hernia is present  Musculoskeletal:         General: Tenderness and signs of injury present  No swelling or deformity  Normal range of motion  Cervical back: Normal range of motion and neck supple  No rigidity or tenderness  Right lower leg: No edema  Left lower leg: No edema  Comments: Examination of the RLE shows shortening and external rotation  Patient has good femoral pulse  Sensation is intact  Cannot move her RLE  Lymphadenopathy:      Cervical: No cervical adenopathy  Skin:     General: Skin is warm and dry  Capillary Refill: Capillary refill takes less than 2 seconds     Neurological:      Mental Status: She is alert  Comments: CONFUSED   Psychiatric:         Behavior: Behavior normal          Vital Signs  ED Triage Vitals   Temperature Pulse Respirations Blood Pressure SpO2   02/04/22 0951 02/04/22 0951 02/04/22 0951 02/04/22 0951 02/04/22 0951   (!) 97 3 °F (36 3 °C) 73 20 (!) 173/81 98 %      Temp Source Heart Rate Source Patient Position - Orthostatic VS BP Location FiO2 (%)   02/04/22 0951 02/04/22 1204 02/04/22 1204 02/04/22 1204 --   Oral Monitor Lying Left arm       Pain Score       02/04/22 0951       No Pain           Vitals:    02/04/22 0951 02/04/22 1204 02/04/22 1535   BP: (!) 173/81 151/66 161/74   Pulse: 73 83 98   Patient Position - Orthostatic VS:  Lying Lying         Visual Acuity      ED Medications  Medications   fentanyl citrate (PF) (FOR EMS ONLY) 100 mcg/2 mL injection 100 mcg (0 mcg Does not apply Given to EMS 2/4/22 1006)   morphine injection 2 mg (2 mg Intravenous Given 2/4/22 1052)   fentanyl citrate (PF) 100 MCG/2ML 50 mcg (50 mcg Intravenous Given 2/4/22 1658)   haloperidol lactate (HALDOL) injection 2 5 mg (2 5 mg Intramuscular Given 2/4/22 1725)       Diagnostic Studies  Results Reviewed     Procedure Component Value Units Date/Time    COVID/FLU/RSV [[id number]]  (Normal) Collected: 02/04/22 1005    Lab Status: Final result Specimen: Nares from Nose Updated: 02/04/22 1051     SARS-CoV-2 Negative     INFLUENZA A PCR Negative     INFLUENZA B PCR Negative     RSV PCR Negative    Narrative:      FOR PEDIATRIC PATIENTS - copy/paste COVID Guidelines URL to browser: [email or web address]    SARS-CoV-2 assay is a Nucleic Acid Amplification assay intended for the  qualitative detection of nucleic acid from SARS-CoV-2 in nasopharyngeal  swabs  Results are for the presumptive identification of SARS-CoV-2 RNA      Positive results are indicative of infection with SARS-CoV-2, the virus  causing COVID-19, but do not rule out bacterial infection or co-infection  with other viruses  Laboratories within the [place] and its  territories are required to report all positive results to the appropriate  public health authorities  Negative results do not preclude SARS-CoV-2  infection and should not be used as the sole basis for treatment or other  patient management decisions  Negative results must be combined with  clinical observations, patient history, and epidemiological information  This test has not been FDA cleared or approved  This test has been authorized by FDA under an Emergency Use Authorization  (EUA)  This test is only authorized for the duration of time the  declaration that circumstances exist justifying the authorization of the  emergency use of an in vitro diagnostic tests for detection of SARS-CoV-2  virus and/or diagnosis of COVID-19 infection under section 564(b)(1) of  the Act, 21 U  S C  922JLE-6(H)(9), unless the authorization is terminated  or revoked sooner  The test has been validated but independent review by FDA  and CLIA is pending  Test performed using [employer] Gene[employer]pert: This RT-PCR assay targets N2,  a region unique to SARS-CoV-2  A conserved region in the E-gene was chosen  for pan-Sarbecovirus detection which includes SARS-CoV-2      CK (with reflex to MB) [[id number]]  (Normal) Collected: 02/04/22 1005    Lab Status: Final result Specimen: Blood from Arm, Left Updated: 02/04/22 1045     Total  0 U/L     High Sensitivity Troponin I Random [[id number]]  (Normal) Collected: 02/04/22 1005    Lab Status: Final result Specimen: Blood from Arm, Left Updated: 02/04/22 1040     HS TnI random 17 ng/L     Comprehensive metabolic panel [[id number]]  (Abnormal) Collected: 02/04/22 1005    Lab Status: Final result Specimen: Blood from Arm, Left Updated: 02/04/22 1032     Sodium 137 mmol/L      Potassium 3 5 mmol/L      Chloride 101 mmol/L      CO2 21 mmol/L      ANION GAP 15 mmol/L      BUN 23 mg/dL      Creatinine 1 36 mg/dL Glucose 160 mg/dL      Calcium 9 9 mg/dL      AST 97 U/L      ALT 18 U/L      Alkaline Phosphatase 76 8 U/L      Total Protein 7 8 g/dL      Albumin 4 3 g/dL      Total Bilirubin 1 07 mg/dL      eGFR 36 ml/min/1 73sq m     Narrative:      [place] guidelines for Chronic Kidney Disease (CKD):     Stage 1 with normal or high GFR (GFR > 90 mL/min/1 73 square meters)    Stage 2 Mild CKD (GFR = 60-89 mL/min/1 73 square meters)    Stage 3A Moderate CKD (GFR = 45-59 mL/min/1 73 square meters)    Stage 3B Moderate CKD (GFR = 30-44 mL/min/1 73 square meters)    Stage 4 Severe CKD (GFR = 15-29 mL/min/1 73 square meters)    Stage 5 End Stage CKD (GFR <15 mL/min/1 73 square meters)  Note: GFR calculation is accurate only with a steady state creatinine    Protime-INR [[id number]]  (Normal) Collected: 02/04/22 1004    Lab Status: Final result Specimen: Blood from Arm, Left Updated: 02/04/22 1026     Protime 13 4 seconds      INR 1 03    CBC and differential [[id number]]  (Abnormal) Collected: 02/04/22 1005    Lab Status: Final result Specimen: Blood from Arm, Left Updated: 02/04/22 1012     WBC 14 00 Thousand/uL      RBC 4 79 Million/uL      Hemoglobin 14 5 g/dL      Hematocrit 42 8 %      MCV 89 fL      MCH 30 3 pg      MCHC 33 9 g/dL      RDW 13 2 %      MPV 9 6 fL      Platelets 188 Thousands/uL      nRBC 0 /100 WBCs      Neutrophils Relative 89 %      Immat GRANS % 1 %      Lymphocytes Relative 2 %      Monocytes Relative 8 %      Eosinophils Relative 0 %      Basophils Relative 0 %      Neutrophils Absolute 12 40 Thousands/µL      Immature Grans Absolute 0 08 Thousand/uL      Lymphocytes Absolute 0 33 Thousands/µL      Monocytes Absolute 1 14 Thousand/µL      Eosinophils Absolute 0 01 Thousand/µL      Basophils Absolute 0 04 Thousands/µL                  CT head wo contrast   Final Result by Stan Jacques MD (02/04 1109)      No acute intracranial abnormality                    Workstation performed: [id number]         XR chest portable   Final Result by Lonnie Barahona MD (02/04 1643)      No acute cardiopulmonary disease  Workstation performed: [id number]         XR hip/pelv 2-3 vws right   Final Result by Lonnie Barahona MD (02/04 1648)      1  Acute intertrochanteric right femoral neck fracture  2   Irregularities of the left superior and inferior pubic rami likely reflects sequelae of prior injury  Correlation with tenderness at this site is advised to assess for acute injury  Workstation performed: [id number]         XR femur 2 views RIGHT   Final Result by Lonnie Barahona MD (02/04 1648)      1  Acute intertrochanteric right femoral neck fracture  2   Irregularities of the left superior and inferior pubic rami likely reflects sequelae of prior injury  Correlation with tenderness at this site is advised to assess for acute injury  Workstation performed: [id number]                    Procedures  Procedures         ED Course  ED Course as of 02/05/22 1001   Fri Feb 04, 2022   1036 EKG; Sinus rhythm rate 71/min, no ischemic changes                                             MDM  Number of Diagnoses or Management Options  Diagnosis management comments: This is an 79years old was sent from assisted living for being found on the floor  Patient came to the ER and we found that her R LE is rotated externally and shortened  X-RAY RIGHT HIP SHOWS INTERTROCHANTERIC FRACTURE     The case was discussed with his orthopedic doctor Prem, and patient would be transferred to wherein  The case discussed with hospitalist Dr Donovan Stark, and patient accepted for transfer         Amount and/or Complexity of Data Reviewed  Clinical lab tests: ordered and reviewed  Tests in the radiology section of CPT®: ordered and reviewed    Risk of Complications, Morbidity, and/or Mortality  Presenting problems: moderate  Diagnostic procedures: moderate  Management options: low        Disposition  Final diagnoses:   Fall, initial encounter   Closed fracture of right hip, initial encounter [facility])     Time reflects when diagnosis was documented in both MDM as applicable and the Disposition within this note     Time User Action Codes Description Comment    2/4/2022 12:24 PM Ezequiel Ocampo Add [M24  PSKB] Fall, initial encounter     2/4/2022 12:25 PM Ezequiel Murrieta Add [S72 001A] Closed fracture of right hip, initial encounter [facility])       ED Disposition     ED Disposition Condition Date/Time Comment    Transfer to Another Facility-In Network  Fri Feb 4, 2022 12:24 PM Brooke Packer should be transferred out to [facility] accepted by dr Heber Forrester   Case discussed with dr Destiny James orthopedist         MD Documentation      Most Recent Value   Patient Condition The patient has been stabilized such that within reasonable medical probability, no material deterioration of the patient condition or the condition of the unborn child(zachary) is likely to result from the transfer   Reason for Transfer Level of Care needed not available at this facility   Benefits of Transfer Specialized equipment and/or services available at the receiving facility (Include comment)________________________   Risks of Transfer Potential for delay in receiving treatment, Potential deterioration of medical condition, Loss of IV, Increased discomfort during transfer, Possible worsening of condition or death during transfer   Accepting Physician Dr Nereyda Frank Name, Anastasia Arriaga   Sending MD Sage Topete   Provider Certification General risk, such as traffic hazards, adverse weather conditions, rough terrain or turbulence, possible failure of equipment (including vehicle or aircraft), or consequences of actions of persons outside the control of the transport personnel, Unanticipated needs of medical equipment and personnel during transport, Risk of worsening condition, The possibility of a transport vehicle being unavailable      RN Documentation      Most Recent Value   Accepting Facility Name, [facility]      Follow-up Information    None         Discharge Medication List as of 2/4/2022  8:05 PM      CONTINUE these medications which have NOT CHANGED    Details   amLODIPine (NORVASC) 2 5 mg tablet Take 2 5 mg by mouth daily, Historical Med      diltiazem (TIAZAC) 360 MG 24 hr capsule Take 360 mg by mouth daily, Historical Med      donepezil (ARICEPT) 10 mg tablet Take 10 mg by mouth daily at bedtime, Historical Med      furosemide (LASIX) 20 mg tablet Take 20 mg by mouth 2 (two) times a day, Historical Med      latanoprost (XALATAN) 0 005 % ophthalmic solution Administer 1 drop to both eyes daily at bedtime, Historical Med      metoprolol succinate (TOPROL-XL) 25 mg 24 hr tablet Take 25 mg by mouth daily, Historical Med      oxyCODONE (ROXICODONE) 5 immediate release tablet Take 5 mg by mouth every 4 (four) hours as needed for moderate pain, Historical Med      pantoprazole (PROTONIX) 40 mg tablet Take 40 mg by mouth daily, Historical Med      potassium chloride (K-DUR,KLOR-CON) 10 mEq tablet Take 10 mEq by mouth 2 (two) times a day, Historical Med      QUEtiapine (SEROquel) 25 mg tablet Take 25 mg by mouth 2 (two) times a day, Historical Med      sacubitril-valsartan (Entresto) 24-26 MG TABS Take 1 tablet by mouth 2 (two) times a day, Historical Med      sertraline (ZOLOFT) 100 mg tablet Take 100 mg by mouth daily, Historical Med      timolol (TIMOPTIC) 0 5 % ophthalmic solution 1 drop 2 (two) times a day, Historical Med             No discharge procedures on file      PDMP Review     None          ED Provider  Electronically Signed by           Marnie Rudd MD  02/05/22 2856

## 2022-02-04 NOTE — ED CARE HANDOFF
Emergency Department Sign Out Note        Sign out and transfer of care from Dr Radhames Rodriguez  See Separate Emergency Department note  The patient, Sanya Lindsey, was evaluated by the previous provider for being found down, with right hip pain  Workup Completed:  Patient's medical workup is complete, patient found to have a right hip fracture, hypoglycemia, patient is pending transfer for further management of hip fracture  ED Course / Workup Pending (followup):  Pending transfer  Procedures  MDM  Number of Diagnoses or Management Options  Closed fracture of right hip, initial encounter [facility])  Fall, initial encounter  Diagnosis management comments: Patient is complaining of worsening pain, is becoming very anxious and tearful  Patient appears to have suspected sundowning she is becoming more altered  Patient is hallucinating saying that her daughter is in the room with her, her daughter is currently in [place]  Patient will be given Haldol to manage delirium, and pain control for the hip pain  1930  Patient transferred successfully for further management of hip fracture  Disposition  Final diagnoses:   Fall, initial encounter   Closed fracture of right hip, initial encounter [facility])     Time reflects when diagnosis was documented in both MDM as applicable and the Disposition within this note     Time User Action Codes Description Comment    2/4/2022 12:24 PM Ezequiel Bashir Add [G19  JQVI] Fall, initial encounter     2/4/2022 12:25 PM Ezequiel Murrieta Add [S72 001A] Closed fracture of right hip, initial encounter [facility])       ED Disposition     ED Disposition Condition Date/Time Comment    Transfer to Another Facility-In Network  Fri Feb 4, 2022 12:24 PM Sanya Lindsey should be transferred out to [place] accepted by dr Sjaan Gaitan   Case discussed with dr Arias Tristan orthopedist         MD Documentation      Most Recent Value   Patient Condition The patient has been stabilized such that within reasonable medical probability, no material deterioration of the patient condition or the condition of the unborn child(zachary) is likely to result from the transfer   Reason for Transfer Level of Care needed not available at this facility   Benefits of Transfer Specialized equipment and/or services available at the receiving facility (Include comment)________________________   Risks of Transfer Potential for delay in receiving treatment, Potential deterioration of medical condition, Loss of IV, Increased discomfort during transfer, Possible worsening of condition or death during transfer   Accepting Physician Dr Yoko Lawson Name, Isaiah Trevizo MD [facility]   Provider Certification General risk, such as traffic hazards, adverse weather conditions, rough terrain or turbulence, possible failure of equipment (including vehicle or aircraft), or consequences of actions of persons outside the control of the transport personnel, Unanticipated needs of medical equipment and personnel during transport, Risk of worsening condition, The possibility of a transport vehicle being unavailable      RN Documentation      Most [address] [facility] Name, Isaiah Leon      Follow-up Information    None       Patient's Medications   Discharge Prescriptions    No medications on file     No discharge procedures on file         ED Provider  Electronically Signed by     Radha German MD  02/04/22 2004

## 2022-02-04 NOTE — ED NOTES
Transportation to [facility] via [facility] around [address] accepting   Number for report [phone]  Room [address] [facility]  02/04/22  Landon Vences Nn, [facility]  02/04/22 2291

## 2022-02-05 ENCOUNTER — APPOINTMENT (INPATIENT)
Dept: NON INVASIVE DIAGNOSTICS | Facility: HOSPITAL | Age: 82
DRG: 481 | End: 2022-02-05
Payer: COMMERCIAL

## 2022-02-05 ENCOUNTER — ANESTHESIA EVENT (INPATIENT)
Dept: PERIOP | Facility: HOSPITAL | Age: 82
DRG: 481 | End: 2022-02-05
Payer: COMMERCIAL

## 2022-02-05 ENCOUNTER — ANESTHESIA (INPATIENT)
Dept: PERIOP | Facility: HOSPITAL | Age: 82
DRG: 481 | End: 2022-02-05
Payer: COMMERCIAL

## 2022-02-05 ENCOUNTER — APPOINTMENT (INPATIENT)
Dept: RADIOLOGY | Facility: HOSPITAL | Age: 82
DRG: 481 | End: 2022-02-05
Payer: COMMERCIAL

## 2022-02-05 LAB
ANION GAP SERPL CALCULATED.3IONS-SCNC: 8 MMOL/L (ref 4–13)
BUN SERPL-MCNC: 32 MG/DL (ref 5–25)
CALCIUM SERPL-MCNC: 9.1 MG/DL (ref 8.3–10.1)
CHLORIDE SERPL-SCNC: 105 MMOL/L (ref 100–108)
CO2 SERPL-SCNC: 25 MMOL/L (ref 21–32)
CREAT SERPL-MCNC: 1.51 MG/DL (ref 0.6–1.3)
ERYTHROCYTE [DISTWIDTH] IN BLOOD BY AUTOMATED COUNT: 13.8 % (ref 11.6–15.1)
GFR SERPL CREATININE-BSD FRML MDRD: 31 ML/MIN/1.73SQ M
GLUCOSE SERPL-MCNC: 105 MG/DL (ref 65–140)
GLUCOSE SERPL-MCNC: 116 MG/DL (ref 65–140)
GLUCOSE SERPL-MCNC: 121 MG/DL (ref 65–140)
GLUCOSE SERPL-MCNC: 99 MG/DL (ref 65–140)
HCT VFR BLD AUTO: 36 % (ref 34.8–46.1)
HGB BLD-MCNC: 12.1 G/DL (ref 11.5–15.4)
MAGNESIUM SERPL-MCNC: 2 MG/DL (ref 1.6–2.6)
MCH RBC QN AUTO: 30.9 PG (ref 26.8–34.3)
MCHC RBC AUTO-ENTMCNC: 33.6 G/DL (ref 31.4–37.4)
MCV RBC AUTO: 92 FL (ref 82–98)
PLATELET # BLD AUTO: 178 THOUSANDS/UL (ref 149–390)
PMV BLD AUTO: 9.9 FL (ref 8.9–12.7)
POTASSIUM SERPL-SCNC: 4 MMOL/L (ref 3.5–5.3)
RBC # BLD AUTO: 3.91 MILLION/UL (ref 3.81–5.12)
SODIUM SERPL-SCNC: 138 MMOL/L (ref 136–145)
WBC # BLD AUTO: 9.67 THOUSAND/UL (ref 4.31–10.16)

## 2022-02-05 PROCEDURE — 99223 1ST HOSP IP/OBS HIGH 75: CPT | Performed by: INTERNAL MEDICINE

## 2022-02-05 PROCEDURE — 0QS606Z REPOSITION RIGHT UPPER FEMUR WITH INTRAMEDULLARY INTERNAL FIXATION DEVICE, OPEN APPROACH: ICD-10-PCS | Performed by: INTERNAL MEDICINE

## 2022-02-05 PROCEDURE — C1713 ANCHOR/SCREW BN/BN,TIS/BN: HCPCS | Performed by: ORTHOPAEDIC SURGERY

## 2022-02-05 PROCEDURE — 85027 COMPLETE CBC AUTOMATED: CPT | Performed by: HOSPITALIST

## 2022-02-05 PROCEDURE — 73502 X-RAY EXAM HIP UNI 2-3 VIEWS: CPT | Performed by: ORTHOPAEDIC SURGERY

## 2022-02-05 PROCEDURE — 27245 TREAT THIGH FRACTURE: CPT | Performed by: ORTHOPAEDIC SURGERY

## 2022-02-05 PROCEDURE — C1769 GUIDE WIRE: HCPCS | Performed by: ORTHOPAEDIC SURGERY

## 2022-02-05 PROCEDURE — 73502 X-RAY EXAM HIP UNI 2-3 VIEWS: CPT

## 2022-02-05 PROCEDURE — 99232 SBSQ HOSP IP/OBS MODERATE 35: CPT | Performed by: INTERNAL MEDICINE

## 2022-02-05 PROCEDURE — 83735 ASSAY OF MAGNESIUM: CPT | Performed by: HOSPITALIST

## 2022-02-05 PROCEDURE — 82948 REAGENT STRIP/BLOOD GLUCOSE: CPT

## 2022-02-05 PROCEDURE — 99223 1ST HOSP IP/OBS HIGH 75: CPT | Performed by: ORTHOPAEDIC SURGERY

## 2022-02-05 PROCEDURE — 80048 BASIC METABOLIC PNL TOTAL CA: CPT | Performed by: HOSPITALIST

## 2022-02-05 DEVICE — TFNA FENESTRATED HELICAL BLADE 110MM - STERILE
Type: IMPLANTABLE DEVICE | Site: LEG | Status: FUNCTIONAL
Brand: TFN-ADVANCE

## 2022-02-05 DEVICE — 5.0MM TI LOCKING SCREW W/T25 STARDRIVE 38MM F/IM NAIL-STER: Type: IMPLANTABLE DEVICE | Site: LEG | Status: FUNCTIONAL

## 2022-02-05 DEVICE — 10MM/130 DEG TI CANN TFNA 170MM - STERILE
Type: IMPLANTABLE DEVICE | Site: LEG | Status: FUNCTIONAL
Brand: TFN-ADVANCE

## 2022-02-05 RX ORDER — CEFAZOLIN SODIUM 2 G/50ML
SOLUTION INTRAVENOUS AS NEEDED
Status: DISCONTINUED | OUTPATIENT
Start: 2022-02-05 | End: 2022-02-05

## 2022-02-05 RX ORDER — PROPOFOL 10 MG/ML
INJECTION, EMULSION INTRAVENOUS AS NEEDED
Status: DISCONTINUED | OUTPATIENT
Start: 2022-02-05 | End: 2022-02-05

## 2022-02-05 RX ORDER — MAGNESIUM HYDROXIDE 1200 MG/15ML
LIQUID ORAL AS NEEDED
Status: DISCONTINUED | OUTPATIENT
Start: 2022-02-05 | End: 2022-02-05 | Stop reason: HOSPADM

## 2022-02-05 RX ORDER — CEFAZOLIN SODIUM 2 G/50ML
2000 SOLUTION INTRAVENOUS ONCE
Status: DISCONTINUED | OUTPATIENT
Start: 2022-02-05 | End: 2022-02-05

## 2022-02-05 RX ORDER — SODIUM CHLORIDE, SODIUM LACTATE, POTASSIUM CHLORIDE, CALCIUM CHLORIDE 600; 310; 30; 20 MG/100ML; MG/100ML; MG/100ML; MG/100ML
100 INJECTION, SOLUTION INTRAVENOUS CONTINUOUS
Status: DISCONTINUED | OUTPATIENT
Start: 2022-02-05 | End: 2022-02-05

## 2022-02-05 RX ORDER — LIDOCAINE HYDROCHLORIDE 10 MG/ML
INJECTION, SOLUTION EPIDURAL; INFILTRATION; INTRACAUDAL; PERINEURAL AS NEEDED
Status: DISCONTINUED | OUTPATIENT
Start: 2022-02-05 | End: 2022-02-05

## 2022-02-05 RX ORDER — BUPIVACAINE HYDROCHLORIDE AND EPINEPHRINE 2.5; 5 MG/ML; UG/ML
INJECTION, SOLUTION EPIDURAL; INFILTRATION; INTRACAUDAL; PERINEURAL AS NEEDED
Status: DISCONTINUED | OUTPATIENT
Start: 2022-02-05 | End: 2022-02-05 | Stop reason: HOSPADM

## 2022-02-05 RX ORDER — ONDANSETRON 2 MG/ML
INJECTION INTRAMUSCULAR; INTRAVENOUS AS NEEDED
Status: DISCONTINUED | OUTPATIENT
Start: 2022-02-05 | End: 2022-02-05

## 2022-02-05 RX ORDER — ONDANSETRON 2 MG/ML
4 INJECTION INTRAMUSCULAR; INTRAVENOUS ONCE AS NEEDED
Status: DISCONTINUED | OUTPATIENT
Start: 2022-02-05 | End: 2022-02-05 | Stop reason: HOSPADM

## 2022-02-05 RX ORDER — FENTANYL CITRATE/PF 50 MCG/ML
25 SYRINGE (ML) INJECTION
Status: DISCONTINUED | OUTPATIENT
Start: 2022-02-05 | End: 2022-02-05 | Stop reason: HOSPADM

## 2022-02-05 RX ORDER — FENTANYL CITRATE 50 UG/ML
INJECTION, SOLUTION INTRAMUSCULAR; INTRAVENOUS AS NEEDED
Status: DISCONTINUED | OUTPATIENT
Start: 2022-02-05 | End: 2022-02-05

## 2022-02-05 RX ORDER — HEPARIN SODIUM 5000 [USP'U]/ML
5000 INJECTION, SOLUTION INTRAVENOUS; SUBCUTANEOUS EVERY 8 HOURS SCHEDULED
Status: DISCONTINUED | OUTPATIENT
Start: 2022-02-06 | End: 2022-02-09

## 2022-02-05 RX ADMIN — LATANOPROST 1 DROP: 50 SOLUTION/ DROPS OPHTHALMIC at 21:47

## 2022-02-05 RX ADMIN — FENTANYL CITRATE 25 MCG: 50 INJECTION, SOLUTION INTRAMUSCULAR; INTRAVENOUS at 10:03

## 2022-02-05 RX ADMIN — ONDANSETRON 4 MG: 2 INJECTION INTRAMUSCULAR; INTRAVENOUS at 11:19

## 2022-02-05 RX ADMIN — DONEPEZIL HYDROCHLORIDE 10 MG: 5 TABLET, FILM COATED ORAL at 21:47

## 2022-02-05 RX ADMIN — FENTANYL CITRATE 25 MCG: 50 INJECTION, SOLUTION INTRAMUSCULAR; INTRAVENOUS at 10:31

## 2022-02-05 RX ADMIN — FENTANYL CITRATE 25 MCG: 50 INJECTION, SOLUTION INTRAMUSCULAR; INTRAVENOUS at 10:40

## 2022-02-05 RX ADMIN — FENTANYL CITRATE 25 MCG: 50 INJECTION, SOLUTION INTRAMUSCULAR; INTRAVENOUS at 10:28

## 2022-02-05 RX ADMIN — OXYCODONE HYDROCHLORIDE 5 MG: 5 TABLET ORAL at 20:41

## 2022-02-05 RX ADMIN — FENTANYL CITRATE 25 MCG: 50 INJECTION INTRAMUSCULAR; INTRAVENOUS at 12:10

## 2022-02-05 RX ADMIN — SACUBITRIL AND VALSARTAN 1 TABLET: 24; 26 TABLET, FILM COATED ORAL at 17:38

## 2022-02-05 RX ADMIN — POTASSIUM CHLORIDE 10 MEQ: 750 TABLET, EXTENDED RELEASE ORAL at 17:37

## 2022-02-05 RX ADMIN — PROPOFOL 100 MG: 10 INJECTION, EMULSION INTRAVENOUS at 09:52

## 2022-02-05 RX ADMIN — FENTANYL CITRATE 25 MCG: 50 INJECTION INTRAMUSCULAR; INTRAVENOUS at 12:26

## 2022-02-05 RX ADMIN — METOPROLOL SUCCINATE 25 MG: 25 TABLET, EXTENDED RELEASE ORAL at 08:46

## 2022-02-05 RX ADMIN — CEFAZOLIN SODIUM 2000 MG: 2 SOLUTION INTRAVENOUS at 10:12

## 2022-02-05 RX ADMIN — LIDOCAINE HYDROCHLORIDE 50 MG: 10 INJECTION, SOLUTION EPIDURAL; INFILTRATION; INTRACAUDAL; PERINEURAL at 09:52

## 2022-02-05 RX ADMIN — QUETIAPINE FUMARATE 25 MG: 25 TABLET ORAL at 17:38

## 2022-02-05 RX ADMIN — TIMOLOL MALEATE 1 DROP: 5 SOLUTION/ DROPS OPHTHALMIC at 17:37

## 2022-02-05 NOTE — ANESTHESIA PREPROCEDURE EVALUATION
Procedure:  INSERTION NAIL IM FEMUR ANTEGRADE (TROCHANTERIC) (Right Leg Upper)    Relevant Problems   ANESTHESIA (within normal limits)      CARDIO   (+) Essential hypertension      /RENAL   (+) Stage 3a chronic kidney disease (HCC)      MUSCULOSKELETAL   (+) Back pain      NEURO/PSYCH   (+) Alzheimer's dementia without behavioral disturbance (HCC)      PULMONARY (within normal limits)        Physical Exam    Airway    Mallampati score: II  TM Distance: <3 FB  Neck ROM: full     Dental   Comment: Lower partial denture, upper dentures,     Cardiovascular  Rhythm: regular, Rate: normal,     Pulmonary  Breath sounds clear to auscultation,     Other Findings        Anesthesia Plan  ASA Score- 3 Emergent    Anesthesia Type- general with ASA Monitors  Additional Monitors:   Airway Plan: LMA  Plan Factors-Exercise tolerance (METS): >4 METS  Chart reviewed  EKG reviewed  Existing labs reviewed  Patient summary reviewed  Patient is not a current smoker  Induction- intravenous  Postoperative Plan- Plan for postoperative opioid use  Planned trial extubation    Informed Consent- Anesthetic plan and risks discussed with patient, legal guardian and daughter  I personally reviewed this patient with the CRNA  Discussed and agreed on the Anesthesia Plan with the CRNA  Javier Maxwell

## 2022-02-05 NOTE — ASSESSMENT & PLAN NOTE
Wt Readings from Last 3 Encounters:   02/04/22 69 1 kg (152 lb 4 8 oz)   02/04/22 61 2 kg (135 lb)   11/05/20 55 3 kg (122 lb)     · Compensated    Continue metoprolol and entresto  · Restart diuretics tomorrow if renal function stable

## 2022-02-05 NOTE — CONSULTS
H&P Exam - Orthopedics   Marta Victoria 79 y o  female MRN: [id number]  Unit/Bed#: [address] [place] [address] Encounter: [id number]    Assessment/Plan     Assessment:  Right hip displaced intertrochanteric fracture  Plan:  I did speak with Juan Jose as well as her daughter Michelet Pressley after also speaking with Dr John Reid about the risks and benefits of surgical fixation of the right hip fracture  They both stated that they understood the risks and benefits of this procedure and wished to go ahead this morning  The risks are inclusive of but not limited to infection, stiffness, medical problems perioperatively, death, blood clots, failure to regain full strength and ability, persistent need for an assistive device, persistent limp, leg length discrepancy, malrotation of the lower extremity, though the bone to unite appropriately, nerve or blood vessel injury causing numbness pain and weakness, and need for further surgery  Dr John Reid felt that her heart was of reasonable function at this point to undergo operation today  Of note, I did also speak with Wing Leavitt last night when Chachovivien Damon arrive at the [facility] and discussed with her the hip fracture as well as the surgery  We agreed that we would wait until cardiology consultation this morning and further discussion this morning prior to the procedure  Wing Leavitt and Vanessaalana Damon both state that they are comfortable with proceeding with the procedure and understand the procedure as well as its risks and benefits  History of Present Illness   HPI:  Marta Victoria is a 79 y o  female who presents with right hip pain and fracture after she suffered a fall yesterday at her assisted living facility  This was an unwitnessed fall  She has a displaced intertrochanteric fracture  She was transferred here from [address] [place] [address] last night    She complains of the pain is sharp and severe and constant and worse with any attempts at range of motion about the right lower extremity and somewhat better with pain medication  It radiates from the groin down the thigh  She does know where she is and is oriented to the month and the year and to her name       Review of Systems   Constitutional: Negative  HENT: Negative  Eyes: Negative  Respiratory: Negative  Cardiovascular: Negative  Gastrointestinal: Negative  Endocrine: Negative  Genitourinary: Negative  Musculoskeletal: Positive for gait problem and joint swelling  Per HPI   Skin: Negative  Allergic/Immunologic: Negative  Hematological: Negative  Psychiatric/Behavioral: Negative  Historical Information   Past Medical History:   Diagnosis Date    Arthritis     Back pain     Dementia ([address] )     Glaucoma     Hypertension     Psychiatric disorder     depression    Tremors of nervous system      Past Surgical History:   Procedure Laterality Date    HYSTERECTOMY       Social History   Social History     Substance and Sexual Activity   Alcohol Use Not Currently     Social History     Substance and Sexual Activity   Drug Use Never     Social History     Tobacco Use   Smoking Status Current Some Day Smoker    Types: Cigarettes   Smokeless Tobacco Never Used     Family History: History reviewed  No pertinent family history  Meds/Allergies   PTA meds:   Prior to Admission Medications   Prescriptions Last Dose Informant Patient Reported? Taking?    QUEtiapine (SEROquel) 25 mg tablet 2/3/2022 at Unknown time  Yes Yes   Sig: Take 25 mg by mouth 2 (two) times a day   amLODIPine (NORVASC) 2 5 mg tablet 2/3/2022 at Unknown time  Yes Yes   Sig: Take 2 5 mg by mouth daily   diltiazem (TIAZAC) 360 MG 24 hr capsule Not Taking at Unknown time Self Yes No   Sig: Take 360 mg by mouth daily   Patient not taking: Reported on 2/4/2022    donepezil (ARICEPT) 10 mg tablet 2/3/2022 at Unknown time  Yes Yes   Sig: Take 10 mg by mouth daily at bedtime   furosemide (LASIX) 20 mg tablet 2/3/2022 at Unknown time  Yes Yes   Sig: Take 20 mg by mouth 2 (two) times a day   latanoprost (XALATAN) 0 005 % ophthalmic solution 2/3/2022 at Unknown time  Yes Yes   Sig: Administer 1 drop to both eyes daily at bedtime   metoprolol succinate (TOPROL-XL) 25 mg 24 hr tablet 2/3/2022 at Unknown time  Yes Yes   Sig: Take 25 mg by mouth daily   oxyCODONE (ROXICODONE) 5 immediate release tablet 2/3/2022 at Unknown time  Yes Yes   Sig: Take 5 mg by mouth every 4 (four) hours as needed for moderate pain   pantoprazole (PROTONIX) 40 mg tablet 2/3/2022 at Unknown time  Yes Yes   Sig: Take 40 mg by mouth daily   potassium chloride (K-DUR,KLOR-CON) 10 mEq tablet 2/3/2022 at Unknown time  Yes Yes   Sig: Take 10 mEq by mouth 2 (two) times a day   sacubitril-valsartan (Entresto) 24-26 MG TABS 2/3/2022 at Unknown time  Yes Yes   Sig: Take 1 tablet by mouth 2 (two) times a day   sertraline (ZOLOFT) 100 mg tablet 2/3/2022 at Unknown time  Yes Yes   Sig: Take 100 mg by mouth daily   timolol (TIMOPTIC) 0 5 % ophthalmic solution 2/3/2022 at Unknown time  Yes Yes   Si drop 2 (two) times a day      Facility-Administered Medications: None     No Known Allergies    Objective   Vitals: Blood pressure 141/75, pulse 75, temperature 98 6 °F (37 °C), temperature source Oral, resp  rate 19, height 5' 8" (1 727 m), weight 69 1 kg (152 lb 4 8 oz), SpO2 96 %  ,Body mass index is 23 16 kg/m²  No intake or output data in the 24 hours ending 22 0847    No intake/output data recorded  Invasive Devices  Report    Peripheral Intravenous Line            Peripheral IV 22 Left Forearm <1 day          Drain            Urethral Catheter 18 Fr  <1 day                Physical Exam  Vitals reviewed  Constitutional:       Appearance: She is well-developed  HENT:      Head: Normocephalic and atraumatic  Right Ear: External ear normal       Left Ear: External ear normal    Eyes:      Conjunctiva/sclera: Conjunctivae normal    Cardiovascular:      Rate and Rhythm: Normal rate        Pulses: Normal pulses  Pulmonary:      Effort: Pulmonary effort is normal  No respiratory distress  Musculoskeletal:      Cervical back: Normal range of motion and neck supple  Skin:     General: Skin is warm  Capillary Refill: Capillary refill takes less than 2 seconds  Neurological:      Mental Status: She is alert and oriented to person, place, and time  Psychiatric:         Behavior: Behavior normal        Right Hip Exam     Other   Sensation: normal  Pulse: present    Comments:  Right lower extremity is shortened and externally rotated  She has pain with any palpation about the right hip  Sensation light touch however is intact throughout right L2 through S1  She has good ability to actively dorsiflex and plantar flex her foot as well as her toes on the right side              Lab Results:   CBC:   Lab Results   Component Value Date    WBC 9 67 02/05/2022    HGB 12 1 02/05/2022    HCT 36 0 02/05/2022    MCV 92 02/05/2022     02/05/2022    MCH 30 9 02/05/2022    MCHC 33 6 02/05/2022    RDW 13 8 02/05/2022    MPV 9 9 02/05/2022    NRBC 0 02/04/2022     Imaging: I have personally reviewed pertinent films in PACS  Right hip x-rays demonstrate a displaced intertrochanteric fracture

## 2022-02-05 NOTE — H&P
[address]  H&P- Malissa Lesches 1940, 79 y o  female MRN: [id number]  Unit/Bed#: [address] [place] [address] Encounter: [id number]  Primary Care Provider: Danie Velasquez MD   Date and time admitted to hospital: 2/4/2022  8:33 PM         [facility] - History and Physical:       Malissa Lesches 79 y o  female MRN: [id number]  Unit/Bed#: [address] Encounter: [id number]  Admitting Physician: Karmen Mauro MD  PCP: Danie Velasquez MD  Date of Admission:  02/04/22        Assessment and Plan:     * Femoral neck fracture [facility])  Assessment & Plan  · Family members are not sure as to whether not they want to move forward with surgery  · Orthopedic surgery aware that family members are indecisive  · Will keep NPO past midnight for possible surgery  · Fall precautions  · [place] catheter placement  · Strict bed rest for now  · Will order pain medications as needed for pain control  · Consult to cardiology placed for perioperative clearance  · Will order echocardiogram as requested by Cardiology    Essential hypertension  Assessment & Plan  · Continue metoprolol    Stage 3a chronic kidney disease ([facility] )  Assessment & Plan  Lab Results   Component Value Date    EGFR 36 02/04/2022    CREATININE 1 36 (H) 02/04/2022     · Monitor for now    Chronic combined systolic and diastolic CHF (congestive heart failure) ([facility] )  Assessment & Plan  Wt Readings from Last 3 Encounters:   02/04/22 69 1 kg (152 lb 4 8 oz)   02/04/22 61 2 kg (135 lb)   11/05/20 55 3 kg (122 lb)     · Continue metoprolol and Entresto        Alzheimer's dementia without behavioral disturbance ([facility] )  Assessment & Plan  · Continue donepezil, Zoloft and Seroquel  · Fall/aspiration precautions  · Will order speech pathology for dysphagia evaluation            VTE Prophylaxis: Heparin  / sequential compression device   Code Status: DNR/DNI    Anticipated Length of Stay:  Patient will be admitted on an Inpatient basis with an anticipated length of stay of  2 midnights  Justification for Hospital Stay:  Hip fracture    Total Time for Visit, including Counseling / Coordination of Care: 30 minutes  Greater than 50% of this total time spent on direct patient counseling and coordination of care  Chief Complaint:   Transfer from [facility]    History of Present Illness:    Kena Azar is a 79 y o  female who presented to the emergency room at [facility] today for evaluation of right-sided hip pain that started after having a fall episode at her assisted living  Apparently staff members found her on the floor next to her couch with her right leg shortened and rotated  They also noted that the patient was hypoglycemic with a sugar of 44  In the emergency room imaging showed an acute intertrochanteric right femoral neck fracture  ED discussed the case with Orthopedic surgery and Orthopedic surgery plans on performing surgical intervention tomorrow  Ortho advised NPO past midnight for possible intervention  The patient is not complaining of any pain  She is alert and awake but confused  She is aware that she is at a hospital      Review of Systems:    Review of Systems   Unable to perform ROS: Dementia       Past Medical and Surgical History:     Past Medical History:   Diagnosis Date    Arthritis     Back pain     Dementia ([address] )     Glaucoma     Hypertension     Psychiatric disorder     depression    Tremors of nervous system        Past Surgical History:   Procedure Laterality Date    HYSTERECTOMY         Meds/Allergies:    Prior to Admission medications    Medication Sig Start Date End Date Taking?  Authorizing Provider   amLODIPine (NORVASC) 2 5 mg tablet Take 2 5 mg by mouth daily    Historical Provider, MD   diltiazem (TIAZAC) 360 MG 24 hr capsule Take 360 mg by mouth daily    Historical Provider, MD   donepezil (ARICEPT) 10 mg tablet Take 10 mg by mouth daily at bedtime    Historical Provider, MD   furosemide (LASIX) 20 mg tablet Take 20 mg by mouth 2 (two) times a day    Historical Provider, MD   latanoprost (XALATAN) 0 005 % ophthalmic solution Administer 1 drop to both eyes daily at bedtime    Historical Provider, MD   metoprolol succinate (TOPROL-XL) 25 mg 24 hr tablet Take 25 mg by mouth daily    Historical Provider, MD   oxyCODONE (ROXICODONE) 5 immediate release tablet Take 5 mg by mouth every 4 (four) hours as needed for moderate pain    Historical Provider, MD   pantoprazole (PROTONIX) 40 mg tablet Take 40 mg by mouth daily    Historical Provider, MD   potassium chloride (K-DUR,KLOR-CON) 10 mEq tablet Take 10 mEq by mouth 2 (two) times a day    Historical Provider, MD   QUEtiapine (SEROquel) 25 mg tablet Take 25 mg by mouth 2 (two) times a day    Historical Provider, MD   sacubitril-valsartan (Entresto) 24-26 MG TABS Take 1 tablet by mouth 2 (two) times a day    Historical Provider, MD   sertraline (ZOLOFT) 100 mg tablet Take 100 mg by mouth daily    Historical Provider, MD   timolol (TIMOPTIC) 0 5 % ophthalmic solution 1 drop 2 (two) times a day    Historical Provider, MD   hydrochlorothiazide (HYDRODIURIL) 25 mg tablet Take 25 mg by mouth daily  2/4/22  Historical Provider, MD   propranolol (INDERAL) 40 mg tablet Take 40 mg by mouth 3 (three) times a day  2/4/22  Historical Provider, MD   quinapril (ACCUPRIL) 10 mg tablet Take 10 mg by mouth daily at bedtime  2/4/22  Historical Provider, MD     I have reveiwed home medications using records provided by [facility]      Allergies: No Known Allergies    Social History:     Marital Status: Unknown     Social History     Substance and Sexual Activity   Alcohol Use Not Currently     Social History     Tobacco Use   Smoking Status Current Some Day Smoker    Types: Cigarettes   Smokeless Tobacco Never Used     Social History     Substance and Sexual Activity   Drug Use Never       Family History:    non-contributory    Physical Exam:     Vitals:   Blood Pressure: (!) 193/96 (02/04/22 2102)  Pulse: 86 (02/04/22 2102)  Temperature: 99 5 °F (37 5 °C) (02/04/22 2100)  Respirations: 19 (02/04/22 2102)  Height: 5' 8" (172 7 cm) (02/04/22 2100)  Weight - Scale: 69 1 kg (152 lb 4 8 oz) (02/04/22 2100)  SpO2: 92 % (02/04/22 2102)    Physical Exam  Constitutional:       Appearance: Normal appearance  HENT:      Head: Normocephalic and atraumatic  Eyes:      Extraocular Movements: Extraocular movements intact  Pupils: Pupils are equal, round, and reactive to light  Cardiovascular:      Rate and Rhythm: Normal rate and regular rhythm  Pulmonary:      Effort: Pulmonary effort is normal       Breath sounds: Normal breath sounds  Abdominal:      General: Bowel sounds are normal       Palpations: Abdomen is soft  Musculoskeletal:      Cervical back: Neck supple  Comments: Restricted range of motion to the right lower extremity and hip due to fracture   Skin:     General: Skin is warm and dry  Neurological:      Mental Status: She is alert and oriented to person, place, and time  Psychiatric:         Mood and Affect: Mood normal        Additional Data:     Lab Results: I have personally reviewed pertinent reports  Results from last 7 days   Lab Units 02/04/22  1005   WBC Thousand/uL 14 00*   HEMOGLOBIN g/dL 14 5   HEMATOCRIT % 42 8   PLATELETS Thousands/uL 221   NEUTROS PCT % 89*   LYMPHS PCT % 2*   MONOS PCT % 8   EOS PCT % 0     Results from last 7 days   Lab Units 02/04/22  1005   SODIUM mmol/L 137   POTASSIUM mmol/L 3 5   CHLORIDE mmol/L 101   CO2 mmol/L 21*   BUN mg/dL 23*   CREATININE mg/dL 1 36*   ANION GAP mmol/L 15*   CALCIUM mg/dL 9 9   ALBUMIN g/dL 4 3   TOTAL BILIRUBIN mg/dL 1 07   ALK PHOS U/L 76 8   ALT U/L 18   AST U/L 97*   GLUCOSE RANDOM mg/dL 160*     Results from last 7 days   Lab Units 02/04/22  1004   INR  1 03                   Imaging: I have personally reviewed pertinent reports        No orders to display       [employer] / Epic Records Reviewed: Yes     ** Please Note: This note has been constructed using a voice recognition system   **

## 2022-02-05 NOTE — ANESTHESIA POSTPROCEDURE EVALUATION
Post-Op Assessment Note    CV Status:  Stable  Pain Score: 1    Pain management: adequate     Mental Status:  Alert and awake   Hydration Status:  Euvolemic   PONV Controlled:  Controlled   Airway Patency:  Patent      Post Op Vitals Reviewed: Yes      Staff: Anesthesiologist         No complications documented      /77 (02/05/22 1200)    Temp      Pulse 79 (02/05/22 1200)   Resp 22 (02/05/22 1200)    SpO2   99

## 2022-02-05 NOTE — ASSESSMENT & PLAN NOTE
· Currently at baseline    Monitor postoperatively    Results from last 7 days   Lab Units 02/05/22  0530 02/04/22  1005   BUN mg/dL 32* 23*   CREATININE mg/dL 1 51* 1 36*   EGFR ml/min/1 73sq m 31 36

## 2022-02-05 NOTE — ASSESSMENT & PLAN NOTE
Lab Results   Component Value Date    EGFR 36 02/04/2022    CREATININE 1 36 (H) 02/04/2022     · Monitor for now

## 2022-02-05 NOTE — UTILIZATION REVIEW
Initial Clinical Review    Admission: Date/Time/Statement:   Admission Orders (From admission, onward)     Ordered        02/04/22 2045  Inpatient Admission  Once                      Orders Placed This Encounter   Procedures    Inpatient Admission     Standing Status:   Standing     Number of Occurrences:   1     Order Specific Question:   Level of Care     Answer:   Med Surg [16]     Order Specific Question:   Estimated length of stay     Answer:   More than 2 Midnights     Order Specific Question:   Certification     Answer:   I certify that inpatient services are medically necessary for this patient for a duration of greater than two midnights  See H&P and MD Progress Notes for additional information about the patient's course of treatment  2/4/2022 (10 hours)  JAMARI [facility] Emergency Department  Right lower extremity femoral neck  fracture   Transfer to [facility] for higher level of care  Prior to arrival : iv ns 125/hr, iv mso4x1, im haldol, iv fentanyl       This is an  79years old brought from assisted living at the patient was found on the floor next to the couch  According to the EMS they found have a blood sugar of 44 and patient was giving glucose was and blood sugar go up to 217  Patient is not diabetic and she does not take medication for diabetes  Patient is demented and cannot give any history  Patient has shortening of the right lower extremity was external rotation  Which is suspected that it may be having right hip fracture  Initial Presentation:     79year old female received from [address] [place] [address] for inpatient med surg admission to continue treatment of right femoral neck fracture  Consult cardiology  Continue patient's cardiac medications  Okay for gentle hydration  Can use p r n  Beta-blocker IV if needed  Clinically there is no evidence of heart failure ischemia noted    I believe patient is in optimal condition for the procedure as planned and is at intermediate risk for the surgery  Date: 2-5-22    Day 2:  Med surg     Anesthesia Start Date/Time: 02/05/22 0946   Procedure: INSERTION NAIL IM FEMUR ANTEGRADE (TROCHANTERIC) (Right Leg Upper)   Anesthesia type: general   Diagnosis: Hip fracture ([address] ) [S72 009A]   Pre-op diagnosis: Hip fracture ([address] ) [S72 009A]       Procedure(s) (LRB):  INSERTION NAIL IM FEMUR ANTEGRADE (TROCHANTERIC) (Right) utilizing Synthes trochanteric femoral nail size 10 mm x 130° x 170 mm as well as a fenestrated helical blade size 194 mm and distal interlocking screw size 5 0 mm x 38 mm    Estimated Blood Loss:   50 cc    Operative Findings:  Right hip fracture demonstrated significant comminution this as we were placing the guidewire along its most proximal aspect  We were however able to get a satisfactory reduction on AP and lateral views and felt that our tip apex distance was less than 20 mm on both views taken together for our prosthesis    We felt that we had a very stable appearance of the hip at the end of the procedure      Complications:   None        ED Triage Vitals   02/04/22 2100 02/04/22 2100 02/04/22 2102 02/04/22 2102 02/04/22 2100   99 5 °F (37 5 °C) 82 19 (!) 193/96 92 %      Oral Monitor         10 - Worst Possible Pain          02/04/22 69 1 kg (152 lb 4 8 oz)     Additional Vital Signs:       Date/Time Temp Pulse Resp BP MAP (mmHg) SpO2 Nasal Cannula O2 Flow Rate (L/min) O2 Device   02/05/22 13:42:14 98 2 °F (36 8 °C) 79 -- 128/69 89 95 % -- --   02/05/22 13:08:43 -- 63 -- 128/71 90 98 % -- --   02/05/22 12:36:50 97 8 °F (36 6 °C) 83 -- 135/69 91 96 % -- --   02/05/22 1215 -- 78 20 144/85 -- 99 % -- None (Room air)   02/05/22 1200 -- 79 22 161/77 -- 100 % -- None (Room air)   02/05/22 1145 99 3 °F (37 4 °C) 82 18 153/75 -- 100 % 4 L/min Simple mask   02/05/22 08:03:06 98 6 °F (37 °C) 75 19 141/75 97 96 % -- None (Room air)   02/05/22 04:09:48 98 3 °F (36 8 °C) 75 18 119/99 106 97 % -- --   02/04/22 23:21:04 99 3 °F (37 4 °C) 95 19 190/97   Abnormal  128 94 % -- --   02/04/22 2102 -- 86 19 193/96   Abnormal  128 92 % -- --   02/04/22 2100 99 5 °F (37 5 °C) 82 -- -- -- 92 % -- --               Pertinent Labs/Diagnostic Test Results:       XR hip/pelv 2-3 vws right   Final  (02/04 1648)       1  Acute intertrochanteric right femoral neck fracture  2   Irregularities of the left superior and inferior pubic rami likely reflects sequelae of prior injury  Correlation with tenderness at this site is advised to assess for acute injury            XR femur 2 views RIGHT   Final  (02/04 1648)       1  Acute intertrochanteric right femoral neck fracture  2   Irregularities of the left superior and inferior pubic rami likely reflects sequelae of prior injury  Correlation with tenderness at this site is advised to assess for acute injury               Results from last 7 days   Lab Units 02/04/22  1005   SARS-COV-2  Negative     Results from last 7 days   Lab Units 02/05/22  0530 02/04/22  1005   WBC Thousand/uL 9 67 14 00*   HEMOGLOBIN g/dL 12 1 14 5   HEMATOCRIT % 36 0 42 8   PLATELETS Thousands/uL 178 221   NEUTROS ABS Thousands/µL  --  12 40*         Results from last 7 days   Lab Units 02/05/22  0530 02/04/22  1005   SODIUM mmol/L 138 137   POTASSIUM mmol/L 4 0 3 5   CHLORIDE mmol/L 105 101   CO2 mmol/L 25 21*   ANION GAP mmol/L 8 15*   BUN mg/dL 32* 23*   CREATININE mg/dL 1 51* 1 36*   EGFR ml/min/1 73sq m 31 36   CALCIUM mg/dL 9 1 9 9   MAGNESIUM mg/dL 2 0  --      Results from last 7 days   Lab Units 02/04/22  1005   AST U/L 97*   ALT U/L 18   ALK PHOS U/L 76 8   TOTAL PROTEIN g/dL 7 8   ALBUMIN g/dL 4 3   TOTAL BILIRUBIN mg/dL 1 07     Results from last 7 days   Lab Units 02/05/22  0656   POC GLUCOSE mg/dl 105     Results from last 7 days   Lab Units 02/05/22  0530 02/04/22  1005   GLUCOSE RANDOM mg/dL 99 160*       Results from last 7 days   Lab Units 02/04/22  1005   CK TOTAL U/L 129 0       Results from last 7 days Lab Units 02/04/22  1004   PROTIME seconds 13 4   INR  1 03         Results from last 7 days   Lab Units 02/04/22  1005   INFLUENZA A PCR  Negative   INFLUENZA B PCR  Negative   RSV PCR  Negative       ED Treatment:   Medication Administration - No Administrations Displayed (No Start Event Found)     None        Past Medical History:   Diagnosis    Arthritis    Back pain    Dementia ([facility] )    Glaucoma    Hypertension    Psychiatric disorder    depression    Tremors of nervous system     Present on Admission:   Alzheimer's dementia without behavioral disturbance (HCC)      Admitting Diagnosis: Hip fracture ([facility] ) [S72 009A]     Age/Sex: 79 y o  female    Scheduled Medications:    donepezil, 10 mg, Oral, HS  [START ON 2/6/2022] heparin (porcine), 5,000 Units, Subcutaneous, Q8H [address]  latanoprost, 1 drop, Both Eyes, HS  metoprolol succinate, 25 mg, Oral, Daily  pantoprazole, 40 mg, Oral, Early Morning  potassium chloride, 10 mEq, Oral, BID  QUEtiapine, 25 mg, Oral, BID  sacubitril-valsartan, 1 tablet, Oral, BID  sertraline, 100 mg, Oral, Daily  timolol, 1 drop, Both Eyes, BID      Continuous IV Infusions:  lactated ringers, 100 mL/hr, Intravenous, Continuous      PRN Meds:  hydrALAZINE, 5 mg, Intravenous, Q4H PRN  oxyCODONE, 2 5 mg, Oral, Q4H PRN  oxyCODONE, 5 mg, Oral, Q4H PRN        IP CONSULT TO ORTHOPEDIC SURGERY  IP CONSULT TO CARDIOLOGY    Network Utilization Review Department  ATTENTION: Please call with any questions or concerns to [phone] and carefully listen to the prompts so that you are directed to the right person  All voicemails are confidential   [facility] all requests for admission clinical reviews, approved or denied determinations and any other requests to dedicated fax number below belonging to the campus where the patient is receiving treatment   List of dedicated fax numbers for the Facilities:  FACILITY NAME NICK Hernandez 25 DENIALS (Administrative/Medical Necessity) [phone] [address] (Maternity/NICU/Pediatrics) 261 [facility],7Th Floor [facility] 40 125 [facility]  [phone]   [address] [facility] [id number] [place] [address] Gladys Sameer [address] [facility] [address] [facility] [phone]

## 2022-02-05 NOTE — PROGRESS NOTES
[address]  Progress Note - Kena Azar 1940, 79 y o  female MRN: [id number]  Unit/Bed#: [address] [place] [address] Encounter: [id number]  Primary Care Provider: Kary Marques MD   Date and time admitted to hospital: 2/4/2022  8:33 PM    * Femoral neck fracture [facility])  Assessment & Plan  · Right intertrochanteric femur fracture status post ORIF  · Pain controlled    Essential hypertension  Assessment & Plan  · Continue metoprolol    Stage 3a chronic kidney disease ([address] )  Assessment & Plan  · Currently at baseline  Monitor postoperatively    Results from last 7 days   Lab Units 02/05/22  0530 02/04/22  1005   BUN mg/dL 32* 23*   CREATININE mg/dL 1 51* 1 36*   EGFR ml/min/1 73sq m 31 36       Chronic combined systolic and diastolic CHF (congestive heart failure) (HCC)  Assessment & Plan  Wt Readings from Last 3 Encounters:   02/04/22 69 1 kg (152 lb 4 8 oz)   02/04/22 61 2 kg (135 lb)   11/05/20 55 3 kg (122 lb)     · Compensated  Continue metoprolol and entresto  · Restart diuretics tomorrow if renal function stable      Alzheimer's dementia without behavioral disturbance (HCC)  Assessment & Plan  · Mentation stable continue donepezil      VTE Pharmacologic Prophylaxis:   Moderate Risk (Score 3-4) - Pharmacological DVT Prophylaxis Ordered: Heparin  Patient Centered Rounds: I have performed bedside rounds with nursing staff today  Discussions with Specialists or Other Care Team Provider:  Case management    Education and Discussions with Family / Patient: grand daughter on telephone    Time Spent for Care: 25 mins  More than 50% of total time spent on counseling and coordination of care as described above      Current Length of Stay: 1 day(s)  Current Patient Status: Inpatient   Certification Statement: The patient will continue to require additional inpatient hospital stay due to postop femur fracture surgery  Discharge Plan / Estimated Discharge Date: Anticipate discharge in 48-72 hrs to discharge location to be determined pending rehab evaluations  Code Status: Level 3 - DNAR and DNI      Subjective:   Patient seen and examined  Returns from [facility] [address] [place] [address]  No complaints    Objective:   Vitals: Blood pressure 128/62, pulse 71, temperature 98 °F (36 7 °C), resp  rate 20, height 5' 8" (1 727 m), weight 69 1 kg (152 lb 4 8 oz), SpO2 97 %  Physical Exam  Vitals reviewed  Constitutional:       General: She is not in acute distress  Appearance: Normal appearance  HENT:      Head: Atraumatic  Eyes:      General: No scleral icterus  Cardiovascular:      Rate and Rhythm: Regular rhythm  Heart sounds: Normal heart sounds  Pulmonary:      Breath sounds: Decreased breath sounds present  No wheezing  Abdominal:      General: Bowel sounds are normal       Palpations: Abdomen is soft  Tenderness: There is no guarding or rebound  Musculoskeletal:         General: No swelling  Skin:     General: Skin is warm  Neurological:      Mental Status: She is alert  Mental status is at baseline  She is disoriented     Psychiatric:         Mood and Affect: Mood normal        Additional Data:   Labs:  Results from last 7 days   Lab Units 02/05/22  0530 02/04/22  1005 02/04/22  1004   WBC Thousand/uL 9 67 14 00*  --    HEMOGLOBIN g/dL 12 1 14 5  --    HEMATOCRIT % 36 0 42 8  --    MCV fL 92 89  --    PLATELETS Thousands/uL 178 221  --    INR   --   --  1 03     Results from last 7 days   Lab Units 02/05/22  0530 02/04/22  1005   SODIUM mmol/L 138 137   POTASSIUM mmol/L 4 0 3 5   CHLORIDE mmol/L 105 101   CO2 mmol/L 25 21*   ANION GAP mmol/L 8 15*   BUN mg/dL 32* 23*   CREATININE mg/dL 1 51* 1 36*   CALCIUM mg/dL 9 1 9 9   ALBUMIN g/dL  --  4 3   TOTAL BILIRUBIN mg/dL  --  1 07   ALK PHOS U/L  --  76 8   ALT U/L  --  18   AST U/L  --  97*   EGFR ml/min/1 73sq m 31 36   GLUCOSE RANDOM mg/dL 99 160*     Results from last 7 days   Lab Units 02/05/22  0530   MAGNESIUM mg/dL 2 0     Results from last 7 days Lab Units 02/04/22  1005   CK TOTAL U/L 129 0                  Results from last 7 days   Lab Units 02/05/22  1539 02/05/22  0656   POC GLUCOSE mg/dl 121 105             * I Have Reviewed All Lab Data Listed Above  Cultures:   Results from last 7 days   Lab Units 02/04/22  1005   INFLUENZA A PCR  Negative       Results from last 7 days   Lab Units 02/04/22  1005   SARS-COV-2  Negative   INFLUENZA A PCR  Negative   INFLUENZA B PCR  Negative   RSV PCR  Negative           Lines/Drains:  Invasive Devices  Report    Peripheral Intravenous Line            Peripheral IV 02/04/22 Left Forearm 1 day          Drain            Urethral Catheter 18 Fr  <1 day              Telemetry:      Imaging:  Imaging Reports Reviewed Today Include:   XR chest portable    Result Date: 2/4/2022  Impression: No acute cardiopulmonary disease  Workstation performed: [id number]     XR hip/pelv 2-3 vws right    Result Date: 2/4/2022  Impression: 1  Acute intertrochanteric right femoral neck fracture  2   Irregularities of the left superior and inferior pubic rami likely reflects sequelae of prior injury  Correlation with tenderness at this site is advised to assess for acute injury  Workstation performed: [id number]     XR femur 2 views RIGHT    Result Date: 2/4/2022  Impression: 1  Acute intertrochanteric right femoral neck fracture  2   Irregularities of the left superior and inferior pubic rami likely reflects sequelae of prior injury  Correlation with tenderness at this site is advised to assess for acute injury  Workstation performed: [id number]     CT head wo contrast    Result Date: 2/4/2022  Impression: No acute intracranial abnormality   Workstation performed: [id number]       Scheduled Meds:  Current Facility-Administered Medications   Medication Dose Route Frequency Provider Last Rate    donepezil  10 mg Oral HS Alea Hilton PA-C      [START ON 2/6/2022] heparin (porcine)  5,000 Units Subcutaneous [facility] Alea Hilton PA-C      hydrALAZINE  5 mg Intravenous Q4H PRN Lyle Bors, PA-C      latanoprost  1 drop Both Eyes HS Lyle Lorenzana, PA-NEENA      metoprolol succinate  25 mg Oral Daily Lyle Bors, [place]      oxyCODONE  2 5 mg Oral Q4H PRN Lyle Bors, PA-NEENA      oxyCODONE  5 mg Oral Q4H PRN Lyle Bors, PA-NEENA      pantoprazole  40 mg Oral Early Morning Lyle Bors, PA-C      potassium chloride  10 mEq Oral BID Lyle Bors, PA-C      QUEtiapine  25 mg Oral BID Lyle Bors, PA-NEENA      sacubitril-valsartan  1 tablet Oral BID Lyle Bors, PA-C      sertraline  100 mg Oral Daily Lyle Bors, PA-C      timolol  1 drop Both Eyes BID Lyle Lorenzana, CRISTINO         Today, Patient Was Seen By: Lulú Solis DO    ** Please Note: Dictation voice to text software may have been used in the creation of this document   **

## 2022-02-05 NOTE — OP NOTE
PERATIVE REPORT  PATIENT NAME: Connie Reina    :  1940  MRN: [id number]  Pt Location: [facility] OR ROOM 03    SURGERY DATE: 2022    Surgeon(s) and Role:     * Sanket Charles MD - Primary     * Sandra Gill PA-C - Assisting necessary for the procedure for assistance with reduction techniques as well as assistance with placement of the prosthesis and fixation techniques    Preop Diagnosis:  Hip fracture ([address] ) Jose Francisco Crosby displaced intertrochanteric fracture right hip    Post-Op Diagnosis Codes:     * Hip fracture ([address] ) Jose Francisco Crosby displaced intertrochanteric fracture right hip    Procedure(s) (LRB):  INSERTION NAIL IM FEMUR ANTEGRADE (TROCHANTERIC) (Right) utilizing Synthes trochanteric femoral nail size 10 mm x 130° x 170 mm as well as a fenestrated helical blade size 329 mm and distal interlocking screw size 5 0 mm x 38 mm    Specimen(s):  * No specimens in log *    Estimated Blood Loss:   50 cc    Drains:  Urethral Catheter 18 Fr  (Active)   Reasons to continue Urinary Catheter  Acute urinary retention/obstruction failing urinary retention protocol 22   Goal for Removal Voiding trial when ambulation improves 22   Site Assessment Clean;Skin intact 22   Linares Care Done 22 0900   Collection Container Standard drainage bag 22   Securement Method Securing device (Describe) 22   Number of days: 0       Anesthesia Type:   General    Operative Indications:  Hip fracture ([address] ) Edith Vásquez is an 79-year-old female who unfortunately suffered a mechanical fall at home yesterday  She was transferred here from [facility]   She was found have a displaced intertrochanteric hip fracture on the right side  I spoke with her as well as her daughter Glen Sepulveda at great length regarding the risks and benefits of this procedure for surgical fixation of the right hip fracture and they both wished for her to go ahead    Unfortunately [facility] does suffer from dementia and thus her daughter makes medical decisions for her  The risks are inclusive of but not limited to infection, stiffness, nerve or blood vessel injury causing numbness pain and weakness, medical problems perioperatively, death, blood clots, failure to regain full strength and ability, persistent need for assistive device, leg length discrepancy, failure to be able to walk again, failure of the bone to unite appropriately, and need for further surgery  Operative Findings:  Right hip fracture demonstrated significant comminution this as we were placing the guidewire along its most proximal aspect  We were however able to get a satisfactory reduction on AP and lateral views and felt that our tip apex distance was less than 20 mm on both views taken together for our prosthesis  We felt that we had a very stable appearance of the hip at the end of the procedure  Complications:   None    Procedure and Technique:  Anti was taken to the operating room and general anesthesia induced on the hospital bed  We then transferred her to the [place] table in the supine position and placed her right lower extremity into traction with the left lower extremity into the well leg charles  The right lower extremity with traction placed had x-rays taken demonstrating satisfactory alignment of the close reduced the fracture on the right on both AP and lateral views  We were then able to prep and drape right lower extremity usual sterile fashion and took a surgical time-out  We utilized fluoroscopic guidance throughout the procedure  A longitudinal incision was made just proximal to the greater trochanter utilizing a 15 blade through skin  We did carefully dissect and longitudinally split the fascia deep to this the 15 blade  We then placed a guidewire and felt that there was significant comminution proximally along the greater trochanter    We did not have to use power for this portion of the procedure and very gently used the starter Reamer and this position  We did choose a size 10 x 130 degree short nail  We did place that and impacted into place in standard fashion utilizing fluoroscopic guidance  We then placed our guide for the helical blade and made a small incision along the thigh laterally just distal to the 1st with a 15 blade through skin  We did incise through the deep fascia utilizing a 15 blade that is a deep 15 blade  We then utilized fluoroscopic guidance to place the guidewire in an appropriate position  There was some hardened bone at the head neck junction that we did need to adjust the wire in order to get past   AP and lateral views were satisfactory  We then drilled the outer cortex and reamed for a size 197 mm helical blade  We felt that were nicely in the femoral head on both AP and lateral views and well centered  We felt that were nicely lying very close to the inferior aspect of the femoral neck as well giving us maximum stability in this region  We then placed our helical blade which was 110 mm  We felt that were nicely in place on both AP and lateral views with good positioning with an appropriate tip to apex distance of less than 20 mm  We then engaged the set screw proximally  We then removed the aiming guide for the blade and placed the aiming guide for the distal interlocking screw  We made a small incision distally here with a 15 blade through skin followed by a deep 15 blade through the fascia  We then placed the distal interlocking screw after drilling for that in standard fashion and it did measure 38 mm in length  We had good purchase  We removed the aiming device  We took final x-rays being AP and lateral views demonstrating appropriate alignment of the prosthesis and of the hip  We then irrigated thoroughly all wounds and closed with 2-0 Vicryl and 4-0 Vicryl and skin glue and applied local anesthetic and applied dry, sterile dressings    She tolerated procedure well and transferred to recovery room stable condition  She will weightbear as tolerated and be on Lovenox for 4 weeks postoperatively for DVT prophylaxis  She will have physical therapy starting tomorrow     I was present for the entire procedure and A qualified resident physician was not available    Patient Disposition:  PACU       SIGNATURE: Tawanna Lara MD  DATE: February 5, 2022  TIME: 11:14 AM

## 2022-02-05 NOTE — ASSESSMENT & PLAN NOTE
· Family members are not sure as to whether not they want to move forward with surgery  · Orthopedic surgery aware that family members are indecisive  · Will keep NPO past midnight for possible surgery  · Fall precautions  · Linares catheter placement  · Strict bed rest for now  · Will order pain medications as needed for pain control  · Consult to cardiology placed for perioperative clearance  · Will order echocardiogram as requested by Cardiology

## 2022-02-05 NOTE — ED NOTES
Transfer report given to [facility] - TOSHIA RN at this time        Gracy High RN  02/04/22 1938

## 2022-02-05 NOTE — TREATMENT PLAN
Discussed with granddaughter on telephone  Patient resides at [facility] in [address] [place]    Will attempt to get most updated med list tomorrow

## 2022-02-05 NOTE — ASSESSMENT & PLAN NOTE
Wt Readings from Last 3 Encounters:   02/04/22 69 1 kg (152 lb 4 8 oz)   02/04/22 61 2 kg (135 lb)   11/05/20 55 3 kg (122 lb)     · Continue metoprolol and Entresto

## 2022-02-05 NOTE — ASSESSMENT & PLAN NOTE
· Continue donepezil, Zoloft and Seroquel  · Fall/aspiration precautions  · Will order speech pathology for dysphagia evaluation

## 2022-02-05 NOTE — NURSING NOTE
Spoke to on call nurse at [facility] ([phone] and [phone]) for information regarding admission

## 2022-02-05 NOTE — SPEECH THERAPY NOTE
Orders received  Patient is NPO for surgery this am  Per RN patient choking with thin liquids- ok to try thickened liquids/ puree as tolerated for meds  Will f/u within 24-48 hr for formal evaluation pending surgery      RUFINA Lorenz , [id number] [facility]  Speech Language Pathologist   Available via [address] [place] [address] #[id number]  [place] #[id number]

## 2022-02-05 NOTE — PLAN OF CARE
Problem: Nutrition/Hydration-ADULT  Goal: Nutrient/Hydration intake appropriate for improving, restoring or maintaining nutritional needs  Description: Monitor and assess patient's nutrition/hydration status for malnutrition  Collaborate with interdisciplinary team and initiate plan and interventions as ordered  Monitor patient's weight and dietary intake as ordered or per policy  Utilize nutrition screening tool and intervene as necessary  Determine patient's food preferences and provide high-protein, high-caloric foods as appropriate       INTERVENTIONS:  - Monitor oral intake, urinary output, labs, and treatment plans  - Assess nutrition and hydration status and recommend course of action  - Evaluate amount of meals eaten  - Assist patient with eating if necessary   - Allow adequate time for meals  - Recommend/ encourage appropriate diets, oral nutritional supplements, and vitamin/mineral supplements  - Order, calculate, and assess calorie counts as needed  - Recommend, monitor, and adjust tube feedings and TPN/PPN based on assessed needs  - Assess need for intravenous fluids  - Provide specific nutrition/hydration education as appropriate  - Include patient/family/caregiver in decisions related to nutrition  Outcome: Progressing     Problem: MUSCULOSKELETAL - ADULT  Goal: Maintain or return mobility to safest level of function  Description: INTERVENTIONS:  - Assess patient's ability to carry out ADLs; assess patient's baseline for ADL function and identify physical deficits which impact ability to perform ADLs (bathing, care of mouth/teeth, toileting, grooming, dressing, etc )  - Assess/evaluate cause of self-care deficits   - Assess range of motion  - Assess patient's mobility  - Assess patient's need for assistive devices and provide as appropriate  - Encourage maximum independence but intervene and supervise when necessary  - Involve family in performance of ADLs  - Assess for home care needs following discharge   - Consider OT consult to assist with ADL evaluation and planning for discharge  - Provide patient education as appropriate  Outcome: Progressing  Goal: Maintain proper alignment of affected body part  Description: INTERVENTIONS:  - Support, maintain and protect limb and body alignment  - Provide patient/ family with appropriate education  Outcome: Progressing     Problem: MOBILITY - ADULT  Goal: Maintain or return to baseline ADL function  Description: INTERVENTIONS:  -  Assess patient's ability to carry out ADLs; assess patient's baseline for ADL function and identify physical deficits which impact ability to perform ADLs (bathing, care of mouth/teeth, toileting, grooming, dressing, etc )  - Assess/evaluate cause of self-care deficits   - Assess range of motion  - Assess patient's mobility; develop plan if impaired  - Assess patient's need for assistive devices and provide as appropriate  - Encourage maximum independence but intervene and supervise when necessary  - Involve family in performance of ADLs  - Assess for home care needs following discharge   - Consider OT consult to assist with ADL evaluation and planning for discharge  - Provide patient education as appropriate  Outcome: Progressing  Goal: Maintains/Returns to pre admission functional level  Description: INTERVENTIONS:  - Perform BMAT or MOVE assessment daily    - Set and communicate daily mobility goal to care team and patient/family/caregiver  - Collaborate with rehabilitation services on mobility goals if consulted  - Perform Range of Motion 4 times a day  - Reposition patient every 2 hours    - Dangle patient 3 times a day  - Stand patient 3 times a day  - Ambulate patient 3 times a day  - Out of bed to chair 3 times a day   - Out of bed for meals 3 times a day  - Out of bed for toileting  - Record patient progress and toleration of activity level   Outcome: Progressing     Problem: Potential for Falls  Goal: Patient will remain free of falls  Description: INTERVENTIONS:  - Educate patient/family on patient safety including physical limitations  - Instruct patient to call for assistance with activity   - Consult OT/PT to assist with strengthening/mobility   - Keep Call bell within reach  - Keep bed low and locked with side rails adjusted as appropriate  - Keep care items and personal belongings within reach  - Initiate and maintain comfort rounds  - Make Fall Risk Sign visible to staff  - Offer Toileting every 2 Hours, in advance of need  - Initiate/Maintain bed alarm  - Obtain necessary fall risk management equipment: bed/chair alarm   - Apply yellow socks and bracelet for high fall risk patients  - Consider moving patient to room near nurses station  Outcome: Progressing     Problem: Prexisting or High Potential for Compromised Skin Integrity  Goal: Skin integrity is maintained or improved  Description: INTERVENTIONS:  - Identify patients at risk for skin breakdown  - Assess and monitor skin integrity  - Assess and monitor nutrition and hydration status  - Monitor labs   - Assess for incontinence   - Turn and reposition patient  - Assist with mobility/ambulation  - Relieve pressure over bony prominences  - Avoid friction and shearing  - Provide appropriate hygiene as needed including keeping skin clean and dry  - Evaluate need for skin moisturizer/barrier cream  - Collaborate with interdisciplinary team   - Patient/family teaching  - Consider wound care consult   Outcome: Progressing

## 2022-02-05 NOTE — PLAN OF CARE
Problem: Nutrition/Hydration-ADULT  Goal: Nutrient/Hydration intake appropriate for improving, restoring or maintaining nutritional needs  Description: Monitor and assess patient's nutrition/hydration status for malnutrition  Collaborate with interdisciplinary team and initiate plan and interventions as ordered  Monitor patient's weight and dietary intake as ordered or per policy  Utilize nutrition screening tool and intervene as necessary  Determine patient's food preferences and provide high-protein, high-caloric foods as appropriate       INTERVENTIONS:  - Monitor oral intake, urinary output, labs, and treatment plans  - Assess nutrition and hydration status and recommend course of action  - Evaluate amount of meals eaten  - Assist patient with eating if necessary   - Allow adequate time for meals  - Recommend/ encourage appropriate diets, oral nutritional supplements, and vitamin/mineral supplements  - Order, calculate, and assess calorie counts as needed  - Assess need for intravenous fluids  - Provide specific nutrition/hydration education as appropriate  - Include patient/family/caregiver in decisions related to nutrition  Outcome: Progressing     Problem: MUSCULOSKELETAL - ADULT  Goal: Maintain or return mobility to safest level of function  Description: INTERVENTIONS:  - Assess patient's ability to carry out ADLs; assess patient's baseline for ADL function and identify physical deficits which impact ability to perform ADLs (bathing, care of mouth/teeth, toileting, grooming, dressing, etc )  - Assess/evaluate cause of self-care deficits   - Assess range of motion  - Assess patient's mobility  - Assess patient's need for assistive devices and provide as appropriate  - Encourage maximum independence but intervene and supervise when necessary  - Involve family in performance of ADLs  - Assess for home care needs following discharge   - Consider OT consult to assist with ADL evaluation and planning for discharge  - Provide patient education as appropriate  Outcome: Progressing  Goal: Maintain proper alignment of affected body part  Description: INTERVENTIONS:  - Support, maintain and protect limb and body alignment  - Provide patient/ family with appropriate education  Outcome: Progressing

## 2022-02-06 LAB
ALBUMIN SERPL BCP-MCNC: 2.6 G/DL (ref 3.5–5)
ALP SERPL-CCNC: 62 U/L (ref 46–116)
ALT SERPL W P-5'-P-CCNC: 21 U/L (ref 12–78)
ANION GAP SERPL CALCULATED.3IONS-SCNC: 6 MMOL/L (ref 4–13)
AST SERPL W P-5'-P-CCNC: 129 U/L (ref 5–45)
BILIRUB SERPL-MCNC: 1.09 MG/DL (ref 0.2–1)
BUN SERPL-MCNC: 44 MG/DL (ref 5–25)
CALCIUM ALBUM COR SERPL-MCNC: 9.2 MG/DL (ref 8.3–10.1)
CALCIUM SERPL-MCNC: 8.1 MG/DL (ref 8.3–10.1)
CHLORIDE SERPL-SCNC: 105 MMOL/L (ref 100–108)
CO2 SERPL-SCNC: 25 MMOL/L (ref 21–32)
CREAT SERPL-MCNC: 1.79 MG/DL (ref 0.6–1.3)
ERYTHROCYTE [DISTWIDTH] IN BLOOD BY AUTOMATED COUNT: 14.1 % (ref 11.6–15.1)
GFR SERPL CREATININE-BSD FRML MDRD: 26 ML/MIN/1.73SQ M
GLUCOSE SERPL-MCNC: 111 MG/DL (ref 65–140)
GLUCOSE SERPL-MCNC: 120 MG/DL (ref 65–140)
GLUCOSE SERPL-MCNC: 133 MG/DL (ref 65–140)
HCT VFR BLD AUTO: 32.2 % (ref 34.8–46.1)
HGB BLD-MCNC: 10.4 G/DL (ref 11.5–15.4)
MCH RBC QN AUTO: 30.9 PG (ref 26.8–34.3)
MCHC RBC AUTO-ENTMCNC: 32.3 G/DL (ref 31.4–37.4)
MCV RBC AUTO: 96 FL (ref 82–98)
PLATELET # BLD AUTO: 195 THOUSANDS/UL (ref 149–390)
PMV BLD AUTO: 10.2 FL (ref 8.9–12.7)
POTASSIUM SERPL-SCNC: 4.1 MMOL/L (ref 3.5–5.3)
PROT SERPL-MCNC: 5.9 G/DL (ref 6.4–8.2)
RBC # BLD AUTO: 3.37 MILLION/UL (ref 3.81–5.12)
SODIUM SERPL-SCNC: 136 MMOL/L (ref 136–145)
WBC # BLD AUTO: 9.09 THOUSAND/UL (ref 4.31–10.16)

## 2022-02-06 PROCEDURE — 97110 THERAPEUTIC EXERCISES: CPT

## 2022-02-06 PROCEDURE — 85027 COMPLETE CBC AUTOMATED: CPT | Performed by: INTERNAL MEDICINE

## 2022-02-06 PROCEDURE — 99232 SBSQ HOSP IP/OBS MODERATE 35: CPT | Performed by: INTERNAL MEDICINE

## 2022-02-06 PROCEDURE — 99024 POSTOP FOLLOW-UP VISIT: CPT | Performed by: ORTHOPAEDIC SURGERY

## 2022-02-06 PROCEDURE — 80053 COMPREHEN METABOLIC PANEL: CPT | Performed by: INTERNAL MEDICINE

## 2022-02-06 PROCEDURE — 82948 REAGENT STRIP/BLOOD GLUCOSE: CPT

## 2022-02-06 PROCEDURE — 97163 PT EVAL HIGH COMPLEX 45 MIN: CPT

## 2022-02-06 PROCEDURE — 97167 OT EVAL HIGH COMPLEX 60 MIN: CPT

## 2022-02-06 RX ORDER — ACETAMINOPHEN 325 MG/1
650 TABLET ORAL 4 TIMES DAILY
Status: DISCONTINUED | OUTPATIENT
Start: 2022-02-06 | End: 2022-02-10 | Stop reason: HOSPADM

## 2022-02-06 RX ORDER — ONDANSETRON 2 MG/ML
4 INJECTION INTRAMUSCULAR; INTRAVENOUS EVERY 6 HOURS PRN
Status: DISCONTINUED | OUTPATIENT
Start: 2022-02-06 | End: 2022-02-10 | Stop reason: HOSPADM

## 2022-02-06 RX ORDER — SODIUM CHLORIDE 9 MG/ML
50 INJECTION, SOLUTION INTRAVENOUS CONTINUOUS
Status: DISCONTINUED | OUTPATIENT
Start: 2022-02-06 | End: 2022-02-07

## 2022-02-06 RX ADMIN — OXYCODONE HYDROCHLORIDE 5 MG: 5 TABLET ORAL at 14:56

## 2022-02-06 RX ADMIN — OXYCODONE HYDROCHLORIDE 5 MG: 5 TABLET ORAL at 05:28

## 2022-02-06 RX ADMIN — METOPROLOL SUCCINATE 25 MG: 25 TABLET, EXTENDED RELEASE ORAL at 08:09

## 2022-02-06 RX ADMIN — QUETIAPINE FUMARATE 25 MG: 25 TABLET ORAL at 08:08

## 2022-02-06 RX ADMIN — LATANOPROST 1 DROP: 50 SOLUTION/ DROPS OPHTHALMIC at 21:54

## 2022-02-06 RX ADMIN — ACETAMINOPHEN 650 MG: 325 TABLET, FILM COATED ORAL at 17:36

## 2022-02-06 RX ADMIN — QUETIAPINE FUMARATE 25 MG: 25 TABLET ORAL at 17:36

## 2022-02-06 RX ADMIN — SERTRALINE HYDROCHLORIDE 100 MG: 100 TABLET ORAL at 08:08

## 2022-02-06 RX ADMIN — ACETAMINOPHEN 650 MG: 325 TABLET, FILM COATED ORAL at 21:52

## 2022-02-06 RX ADMIN — HEPARIN SODIUM 5000 UNITS: 5000 INJECTION INTRAVENOUS; SUBCUTANEOUS at 05:14

## 2022-02-06 RX ADMIN — POTASSIUM CHLORIDE 10 MEQ: 750 TABLET, EXTENDED RELEASE ORAL at 17:36

## 2022-02-06 RX ADMIN — SODIUM CHLORIDE 50 ML/HR: 0.9 INJECTION, SOLUTION INTRAVENOUS at 07:57

## 2022-02-06 RX ADMIN — POTASSIUM CHLORIDE 10 MEQ: 750 TABLET, EXTENDED RELEASE ORAL at 08:08

## 2022-02-06 RX ADMIN — TIMOLOL MALEATE 1 DROP: 5 SOLUTION/ DROPS OPHTHALMIC at 17:36

## 2022-02-06 RX ADMIN — DONEPEZIL HYDROCHLORIDE 10 MG: 5 TABLET, FILM COATED ORAL at 21:52

## 2022-02-06 RX ADMIN — TIMOLOL MALEATE 1 DROP: 5 SOLUTION/ DROPS OPHTHALMIC at 08:08

## 2022-02-06 RX ADMIN — PANTOPRAZOLE SODIUM 40 MG: 40 TABLET, DELAYED RELEASE ORAL at 05:14

## 2022-02-06 RX ADMIN — OXYCODONE HYDROCHLORIDE 5 MG: 5 TABLET ORAL at 10:57

## 2022-02-06 RX ADMIN — HEPARIN SODIUM 5000 UNITS: 5000 INJECTION INTRAVENOUS; SUBCUTANEOUS at 14:56

## 2022-02-06 RX ADMIN — HEPARIN SODIUM 5000 UNITS: 5000 INJECTION INTRAVENOUS; SUBCUTANEOUS at 21:53

## 2022-02-06 NOTE — OCCUPATIONAL THERAPY NOTE
OT EVALUATION       02/06/22 1012   Note Type   Note type Evaluation   Restrictions/Precautions   RLE Weight Bearing Per Order WBAT   Other Precautions Chair Alarm; Bed Alarm;Cognitive; Fall Risk;Pain   Pain Assessment   Pain Assessment Tool Corey-Baker FACES   Pain Score 10 - Worst Possible Pain   Pain Location/Orientation Orientation: Right;Location: Hip  (with activity, nurse made aware )   Home Living   Type of [address]   Prior Function   Level of [place] Needs assistance with IADLs; Needs assistance with ADLs and functional mobility   Lives With Facility staff   Receives Help From Personal care attendant   ADL Assistance Needs assistance   IADLs Needs assistance   Comments pt is a poor historian, unable to state home setup or prior functional level    ADL   Eating Assistance 2  Maximal Assistance   Grooming Assistance 2  Maximal Assistance   [address] 1  Total Assistance   LB [address] 1  Total Assistance   [address] 1  Total Assistance   LB Dressing Assistance 1  Total [address] [place] [address]  1  Total Assistance   Bed Mobility   Supine to Sit 2  Maximal assistance   Additional items Assist x 2;Verbal cues   Sit to Supine 2  Maximal assistance   Additional items Assist x 2;Verbal cues   Transfers   Sit to Stand 2  Maximal assistance   Additional items Assist x 2;Verbal cues   Stand to Sit 2  Maximal assistance   Additional items Assist x 2;Verbal cues   Stand pivot 2  Maximal assistance   Additional items Assist x 2;Verbal cues  (stand pivot bed to chair with hand hold assist )   Balance   Static Sitting Poor +   Dynamic Sitting Poor   Static Standing Poor -   Dynamic Standing Poor -   Activity Tolerance   Activity Tolerance Patient limited by pain; Patient limited by fatigue  (cognition )   Nurse Made Aware yes, [place]   RUE Assessment   RUE Assessment   (Elbow/grup WFL, PROM shoulder WFL 2-/5 MMT)   LUE Assessment   LUE Assessment WFL   Cognition   Overall Cognitive Status Impaired   Arousal/Participation Cooperative   Attention Attends with cues to redirect   Orientation Level Oriented to person;Oriented to place   Following Commands Follows one step commands with increased time or repetition   Assessment   Limitation Decreased ADL status; Decreased UE strength;Decreased Safe judgement during ADL;Decreased UE ROM; Decreased cognition;Decreased endurance;Decreased high-level ADLs; Decreased self-care trans  (decreased balance and mobility )   Prognosis Good   Assessment Patient evaluated by Occupational Therapy  Patient admitted with Femoral neck fracture (HCC) s/p IM nail 2/5/22, WBAT  The patients occupational profile, medical and therapy history includes a extensive additional review of physical, cognitive, or psychosocial history related to current functional performance  Comorbidities affecting functional mobility and ADLS include: arthritis, chronic back pain, dementia, glaucoma and hypertension and tremors of the nervous system  Prior to admission, patient was requiring assist for ADLS, requiring assist for IADLS and an assisted living resident  The evaluation identifies the following performance deficits: weakness, decreased ROM, impaired balance, decreased endurance, increased fall risk, new onset of impairment of functional mobility, decreased ADLS, decreased IADLS, pain, decreased activity tolerance, decreased safety awareness, impaired judgement, ortheopedic restrictions, decreased cognition and decreased strength, that result in activity limitations and/or participation restrictions  This evaluation requires clinical decision making of high complexity, because the patient presents with comorbidites that affect occupational performance and required significant modification of tasks or assistance with consideration of multiple treatment options    The Barthel Index was used as a functional outcome tool presenting with a score of 5, indicating marked limitations of functional mobility and ADLS  The patient's raw score on the AM-PAC Daily Activity inpatient short form low function score is 12, standardized score is Low Function Daily Activity Standardized Score: 21 38  Patients with a standardized score less than 39 4 are likely to benefit from discharge to post-acute rehab services  Please refer to the recommendation of the Occupational Therapist for safe discharge planning  Goals   Patient Goals unable to state due to cognitive impairment    STG Time Frame   (1-7 days)   Short Term Goal  Patient will increase standing tolerance to 1 minutes during ADL task to decrease assistance level and decrease fall risk; Patient will increase bed mobility to mod assist of 2 in preparation for ADLS and transfers; Patient will increase functional mobility to and from bathroom with rolling walker with mod assist of 2 to increase performance with ADLS and to use a toilet; Patient will tolerate 10 minutes of UE ROM/strengthening to increase general activity tolerance and performance in ADLS/IADLS; Patient will improve functional activity tolerance to 10 minutes of sustained functional tasks to increase participation in basic self-care and decrease assistance level;   Patient will increase dynamic sitting balance to fair- to improve the ability to sit at edge of bed or on a chair for ADLS;  Patient will increase dynamic standing balance to poor to improve postural stability and decrease fall risk during standing ADLS and transfers  LTG Time Frame   (8-14 days)   Long Term Goal Patient will increase standing tolerance to 2 minutes during ADL task to decrease assistance level and decrease fall risk; Patient will increase bed mobility to mod assist in preparation for ADLS and transfers;  Patient will increase functional mobility to and from bathroom with rolling walker with mod assist to increase performance with ADLS and to use a toilet; Patient will tolerate 20 minutes of UE ROM/strengthening to increase general activity tolerance and performance in ADLS/IADLS; Patient will improve functional activity tolerance to 20 minutes of sustained functional tasks to increase participation in basic self-care and decrease assistance level;   Patient will increase dynamic sitting balance to fair to improve the ability to sit at edge of bed or on a chair for ADLS;  Patient will increase dynamic standing balance to poor+ to improve postural stability and decrease fall risk during standing ADLS and transfers  Pt will score >/= 12/24 on AM-PAC Daily Activity Inpatient scale to promote safe independence with ADLs and functional mobility; Pt will score >/= 35/100 on Barthel Index in order to decrease caregiver assistance needed and increase ability to perform ADLs and functional mobility  Functional Transfer Goals   Pt Will Perform All Functional Transfers   (STG mod assist of 2 LTG mod assist )   ADL Goals   Pt Will Perform Eating   (STG mod assist LTG min assist )   Pt Will Perform Grooming   (STG mod assist LTG min assist )   Pt Will Perform Bathing   (STG max assist LTG Mod assist )   Pt Will Perform UE Dressing   (STG max assist LTG Mod assist )   Pt Will Perform LE Dressing   (STG max assist LTG Mod assist )   Pt Will Perform Toileting   (STG max assist LTG Mod assist )   Plan   Treatment Interventions ADL retraining;Functional transfer training;UE strengthening/ROM; Endurance training;Patient/family training;Equipment evaluation/education;Cognitive reorientation; Activityengagement; Compensatory technique education   Goal Expiration Date 02/20/22   OT Frequency 5x/wk   Recommendation   OT Discharge Recommendation Post acute rehabilitation services   AM-[facility] Daily Activity Inpatient   Lower Body Dressing 1   Bathing 1   Toileting 1   Upper Body Dressing 1   Grooming 1   Eating 1   Daily Activity Raw Score 6   Turning Head Towards Sound 3   Follow Simple Instructions 3 Low Function Daily Activity Raw Score 12   Low Function Daily Activity Standardized Score 21 38   AM-PAC Applied Cognition Inpatient   Following a Speech/Presentation 2   Understanding Ordinary Conversation 3   Taking Medications 1   Remembering Where Things Are Placed or Put Away 1   Remembering List of 4-5 Errands 1   Taking Care of Complicated Tasks 1   Applied Cognition Raw Score 9   Applied Cognition Standardized Score 22 48   Barthel Index   Feeding 0   Bathing 0   Grooming Score 0   Dressing Score 0   Bladder Score 0   Bowels Score 0   Toilet Use Score 0   Transfers (Bed/Chair) Score 5   Mobility (Level Surface) Score 0   Stairs Score 0   Barthel Index Score 5   Licensure   [place] License Number  Virlinda Bin [facility] [address] 86 OTR/L [id number]

## 2022-02-06 NOTE — ASSESSMENT & PLAN NOTE
Wt Readings from Last 3 Encounters:   02/04/22 69 1 kg (152 lb 4 8 oz)   02/04/22 61 2 kg (135 lb)   11/05/20 55 3 kg (122 lb)     · Compensated if not dried    Continue metoprolol and entresto   · Restart diuretics when renal function improved

## 2022-02-06 NOTE — PLAN OF CARE
Problem: Nutrition/Hydration-ADULT  Goal: Nutrient/Hydration intake appropriate for improving, restoring or maintaining nutritional needs  Description: Monitor and assess patient's nutrition/hydration status for malnutrition  Collaborate with interdisciplinary team and initiate plan and interventions as ordered  Monitor patient's weight and dietary intake as ordered or per policy  Utilize nutrition screening tool and intervene as necessary  Determine patient's food preferences and provide high-protein, high-caloric foods as appropriate       INTERVENTIONS:  - Monitor oral intake, urinary output, labs, and treatment plans  - Assess nutrition and hydration status and recommend course of action  - Evaluate amount of meals eaten  - Assist patient with eating if necessary   - Allow adequate time for meals  - Recommend/ encourage appropriate diets, oral nutritional supplements, and vitamin/mineral supplements  - Order, calculate, and assess calorie counts as needed  - Recommend, monitor, and adjust tube feedings and TPN/PPN based on assessed needs  - Assess need for intravenous fluids  - Provide specific nutrition/hydration education as appropriate  - Include patient/family/caregiver in decisions related to nutrition  Outcome: Progressing     Problem: MUSCULOSKELETAL - ADULT  Goal: Maintain or return mobility to safest level of function  Description: INTERVENTIONS:  - Assess patient's ability to carry out ADLs; assess patient's baseline for ADL function and identify physical deficits which impact ability to perform ADLs (bathing, care of mouth/teeth, toileting, grooming, dressing, etc )  - Assess/evaluate cause of self-care deficits   - Assess range of motion  - Assess patient's mobility  - Assess patient's need for assistive devices and provide as appropriate  - Encourage maximum independence but intervene and supervise when necessary  - Involve family in performance of ADLs  - Assess for home care needs following discharge   - Consider OT consult to assist with ADL evaluation and planning for discharge  - Provide patient education as appropriate  Outcome: Progressing  Goal: Maintain proper alignment of affected body part  Description: INTERVENTIONS:  - Support, maintain and protect limb and body alignment  - Provide patient/ family with appropriate education  Outcome: Progressing     Problem: MOBILITY - ADULT  Goal: Maintain or return to baseline ADL function  Description: INTERVENTIONS:  -  Assess patient's ability to carry out ADLs; assess patient's baseline for ADL function and identify physical deficits which impact ability to perform ADLs (bathing, care of mouth/teeth, toileting, grooming, dressing, etc )  - Assess/evaluate cause of self-care deficits   - Assess range of motion  - Assess patient's mobility; develop plan if impaired  - Assess patient's need for assistive devices and provide as appropriate  - Encourage maximum independence but intervene and supervise when necessary  - Involve family in performance of ADLs  - Assess for home care needs following discharge   - Consider OT consult to assist with ADL evaluation and planning for discharge  - Provide patient education as appropriate  Outcome: Progressing  Goal: Maintains/Returns to pre admission functional level  Description: INTERVENTIONS:  - Perform BMAT or MOVE assessment daily    - Set and communicate daily mobility goal to care team and patient/family/caregiver  - Collaborate with rehabilitation services on mobility goals if consulted  - Perform Range of Motion 2 times a day  - Reposition patient every 2 hours    - Dangle patient 2 times a day  - Stand patient 2 times a day  - Ambulate patient 2 times a day  - Out of bed to chair 2 times a day   - Out of bed for meals 2 times a day  - Out of bed for toileting  - Record patient progress and toleration of activity level   Outcome: Progressing     Problem: Potential for Falls  Goal: Patient will remain free of falls  Description: INTERVENTIONS:  - Educate patient/family on patient safety including physical limitations  - Instruct patient to call for assistance with activity   - Consult OT/PT to assist with strengthening/mobility   - Keep Call bell within reach  - Keep bed low and locked with side rails adjusted as appropriate  - Keep care items and personal belongings within reach  - Initiate and maintain comfort rounds  - Make Fall Risk Sign visible to staff  - Offer Toileting every 2 Hours, in advance of need  - Initiate/Maintain bed alarm  - Obtain necessary fall risk management equipment: fall risk   - Apply yellow socks and bracelet for high fall risk patients  - Consider moving patient to room near nurses station  Outcome: Progressing     Problem: Prexisting or High Potential for Compromised Skin Integrity  Goal: Skin integrity is maintained or improved  Description: INTERVENTIONS:  - Identify patients at risk for skin breakdown  - Assess and monitor skin integrity  - Assess and monitor nutrition and hydration status  - Monitor labs   - Assess for incontinence   - Turn and reposition patient  - Assist with mobility/ambulation  - Relieve pressure over bony prominences  - Avoid friction and shearing  - Provide appropriate hygiene as needed including keeping skin clean and dry  - Evaluate need for skin moisturizer/barrier cream  - Collaborate with interdisciplinary team   - Patient/family teaching  - Consider wound care consult   Outcome: Progressing

## 2022-02-06 NOTE — ASSESSMENT & PLAN NOTE
· Mild renal insufficiency    Holding diuretics    Results from last 7 days   Lab Units 02/06/22  0517 02/05/22  0530 02/04/22  1005   BUN mg/dL 44* 32* 23*   CREATININE mg/dL 1 79* 1 51* 1 36*   EGFR ml/min/1 73sq m 26 31 36

## 2022-02-06 NOTE — PROGRESS NOTES
Progress Note - Orthopedics   Hattie High 79 y o  female MRN: [id number]  Unit/Bed#: 2 [place] [address]      Subjective:    79 y  o female POD #1 right femoral TFN  No acute events  Patient does have Alzheimer's dementia and is tired this morning, but shakes her head no when asked if she's having pain  Denies other consitutional complaints      Labs:  0   Lab Value Date/Time    HCT 32 2 (L) 02/06/2022 0517    HCT 36 0 02/05/2022 0530    HCT 42 8 02/04/2022 1005    HGB 10 4 (L) 02/06/2022 0517    HGB 12 1 02/05/2022 0530    HGB 14 5 02/04/2022 1005    INR 1 03 02/04/2022 1004    WBC 9 09 02/06/2022 0517    WBC 9 67 02/05/2022 0530    WBC 14 00 (H) 02/04/2022 1005       Meds:    Current Facility-Administered Medications:     donepezil (ARICEPT) tablet 10 mg, 10 mg, Oral, HS, Shelly Tatum PA-C, 10 mg at 02/05/22 2147    heparin (porcine) subcutaneous injection 5,000 Units, 5,000 Units, Subcutaneous, Q8H [facility], Shelly Tatum PA-C, 5,000 Units at 02/06/22 0514    hydrALAZINE (APRESOLINE) injection 5 mg, 5 mg, Intravenous, Q4H PRN, Shelly Tatum PA-C, 5 mg at 02/04/22 2330    latanoprost (XALATAN) 0 005 % ophthalmic solution 1 drop, 1 drop, Both Eyes, HS, Shelly Tatum PA-C, 1 drop at 02/05/22 2147    metoprolol succinate (TOPROL-XL) 24 hr tablet 25 mg, 25 mg, Oral, Daily, Shelly Tatum PA-C, 25 mg at 02/06/22 0809    ondansetron (ZOFRAN) injection 4 mg, 4 mg, Intravenous, Q6H PRN, Gayle Saliva, CRNP    oxyCODONE (ROXICODONE) IR tablet 2 5 mg, 2 5 mg, Oral, Q4H PRN, Shelly Tatum PA-C    oxyCODONE (ROXICODONE) IR tablet 5 mg, 5 mg, Oral, Q4H PRN, Shelly Tatum PA-C, 5 mg at 02/06/22 0528    pantoprazole (PROTONIX) EC tablet 40 mg, 40 mg, Oral, Early Morning, Shelly Tatum PA-C, 40 mg at 02/06/22 0514    potassium chloride (K-DUR,KLOR-CON) CR tablet 10 mEq, 10 mEq, Oral, BID, Shelly Tatum PA-C, 10 mEq at 02/06/22 0808    QUEtiapine (SEROquel) tablet 25 mg, 25 mg, Oral, BID, Carlos A Davis CRISTINO Bauman, 25 mg at 02/06/22 0808    sacubitril-valsartan (ENTRESTO) 24-26 MG per tablet 1 tablet, 1 tablet, Oral, BID, INA Jaramillo    sertraline (ZOLOFT) tablet 100 mg, 100 mg, Oral, Daily, [place]yl Terrazas PA-C, 100 mg at 02/06/22 0808    sodium chloride 0 9 % infusion, 50 mL/hr, Intravenous, Continuous, INA Jaramillo, Last Rate: 50 mL/hr at 02/06/22 0757, 50 mL/hr at 02/06/22 0757    timolol (TIMOPTIC) 0 5 % ophthalmic solution 1 drop, 1 drop, Both Eyes, BID, Declanberyl Terrazas PA-C, 1 drop at 02/06/22 3194    Blood Culture:   No results found for: BLOODCX    Wound Culture:   No results found for: WOUNDCULT    Ins and Outs:  I/O last 24 hours: In: 650 [I V :650]  Out: 540 [Urine:490; Blood:50]          Physical:  Vitals:    02/06/22 0724   BP: 117/55   Pulse: 76   Resp:    Temp: 98 °F (36 7 °C)   SpO2: 99%     Musculoskeletal: right Lower Extremity  · Dressing - c/d/i  · Calf compressible, nontender  · SILT L2-S1 (patient shakes her head 'yes' when asked if she can feel sensation in different nerve distributions)  · +FHL/EHL, +ankle dorsi/plantar flexion  · +DP pulse    _*_*_*_*_*_*_*_*_*_*_*_*_*_*_*_*_*_*_*_*_*_*_*_*_*_*_*_*_*_*_*_*_*_*_*_*_*_*_*_*_*    Assessment:    79 y  o female POD #1 right femoral TFN      Plan:  · WBAT RLE  · Up with Assistance  · PT/OT  · Pain control per primary  · DVT ppx - SCDs on, Recommend 4 weeks of postop Lovenox   · Dispo: Ortho will follow    El Dennis PA-C

## 2022-02-06 NOTE — ASSESSMENT & PLAN NOTE
· Right intertrochanteric femur fracture status post ORIF  · Will schedule tylenol  Oxycodone for severe/breakthrough pain  · Therapy recommending rehab  · Medication reconciliation:  Attempted to call [facility] however main office is closed on the weekend  Does not appear she is on diltiazem based on [facility] records    Will need to call to confirm medications prior to discharge

## 2022-02-06 NOTE — PROGRESS NOTES
Progress Note - Cardiology   [facility]     Pedrito Factor 79 y o  female MRN: [id number]  : 1940  Unit/Bed#: 2 [place] [address] Encounter: [id number]    Assessment and Plan:   1  Acute intertrochanteric right femoral fracture:  POD#1 intertrochanteric nailing of right femoral fracture    -  care per Ortho and PT/OT    2  Hypertension:  Postop hypotension  Will hold Entresto and continue monitor    3  Chronic combined systolic and diastolic heart failure:  Repeat limited echo pending to evaluate EF     -  EF on echo performed at [facility] in 2021 was 60-65%    -  will hold Entresto this morning due to hypotension and acute kidney injury, changed to parameter at this time to hold for blood pressure less than 130      -  will need close monitoring    -  continue Toprol XL 25 mg daily    -  monitor I&O, daily weights and labs    4  Acute kidney injury on Chronic kidney disease stage 3:  Baseline creatinine appears to be around 1 3, this morning creatinine is 1 79 with BUN of 44  -  patient with poor oral intake, will start normal saline at 50 mL/hour with close monitoring     5  Alzheimer's dementia:  Disoriented this morning, most likely due to anesthesia effect    -  will need close monitoring every orientation  Subjective / Objective:   Patient seen and examined  She underwent intertrochanteric nailing of her right femoral fracture  Slightly more confused today most likely secondary to anesthesia  Appears to be slightly in distress, denies any pain, chest pain or shortness of breath  Does state at times she feels she may be nauseous  I spoke with nurse, patient had narcotic around 530  She will give her a dose of Zofran to see if this helps her symptoms  Patient with acute kidney injury today with bump in creatinine and BUN    Will hold her Entresto and start gentle IV hydration with normal saline at 50 mL/hr     Vitals: Blood pressure 117/55, pulse 76, temperature 98 °F (36 7 °C), resp  rate 18, height 5' 8" (1 727 m), weight 69 1 kg (152 lb 4 8 oz), SpO2 99 %  Vitals:    02/04/22 2100   Weight: 69 1 kg (152 lb 4 8 oz)     Body mass index is 23 16 kg/m²  BP Readings from Last 3 Encounters:   02/06/22 117/55   02/04/22 161/74   11/05/20 116/68     Orthostatic Blood Pressures      Most Recent Value   Blood Pressure 117/55 filed at 02/06/2022 0724   Patient Position - Orthostatic VS Lying filed at 02/05/2022 1928        I/O       02/04 0701 02/05 0700 02/05 0701 02/06 0700 02/06 0701 02/07 0700    P  O   0     I V  (mL/kg)  650 (9 4)     Total Intake(mL/kg)  650 (9 4)     Urine (mL/kg/hr)  490 (0 3)     Emesis/NG output  0     Stool  0     Blood  50     Total Output  540     Net  +110            Unmeasured Stool Occurrence  0 x         Invasive Devices  Report    Peripheral Intravenous Line            Peripheral IV 02/04/22 Left Forearm 1 day          Drain            Urethral Catheter 18 Fr  1 day                  Intake/Output Summary (Last 24 hours) at 2/6/2022 0751  Last data filed at 2/6/2022 0515  Gross per 24 hour   Intake 650 ml   Output 540 ml   Net 110 ml         Physical Exam:   Physical Exam  Vitals and nursing note reviewed  Constitutional:       Appearance: Normal appearance  She is well-developed and normal weight  She is ill-appearing  HENT:      Head: Normocephalic  Right Ear: External ear normal       Left Ear: External ear normal       Nose: Nose normal    Eyes:      General: No scleral icterus  Right eye: No discharge  Left eye: No discharge  Neck:      Thyroid: No thyromegaly  Cardiovascular:      Rate and Rhythm: Normal rate and regular rhythm  Pulses: Normal pulses  Heart sounds: Normal heart sounds  No murmur heard  Pulmonary:      Effort: Pulmonary effort is normal  No accessory muscle usage or respiratory distress  Breath sounds: Examination of the right-lower field reveals decreased breath sounds  Examination of the left-lower field reveals decreased breath sounds  Decreased breath sounds present  Abdominal:      General: Bowel sounds are normal  There is no distension  Palpations: Abdomen is soft  Musculoskeletal:      Cervical back: Normal range of motion and neck supple  Right lower leg: No edema  Left lower leg: No edema  Skin:     General: Skin is warm and dry  Capillary Refill: Capillary refill takes less than 2 seconds  Neurological:      Mental Status: She is alert  Comments: Patient appears to be slightly more confused today most likely secondary effect of anesthesia    Will need to continue monitor                   Medications/ Allergies:     Current Facility-Administered Medications   Medication Dose Route Frequency Provider Last Rate    donepezil  10 mg Oral HS Jaimie Base, PA-C      heparin (porcine)  5,000 Units Subcutaneous [facility] [place]      hydrALAZINE  5 mg Intravenous Q4H PRN Jaimie Base, PA-C      latanoprost  1 drop Both Eyes HS Jaimie Base, PA-C      metoprolol succinate  25 mg Oral Daily Jaimie Base, PA-C      ondansetron  4 mg Intravenous Q6H PRN Carla Pay, CRNP      oxyCODONE  2 5 mg Oral Q4H PRN Jaimie Base, PA-C      oxyCODONE  5 mg Oral Q4H PRN Jaimie Base, PA-C      pantoprazole  40 mg Oral Early Morning Jaimie Base, PA-C      potassium chloride  10 mEq Oral BID Jaimie Base, PA-C      QUEtiapine  25 mg Oral BID Jaimie Base, PA-C      sacubitril-valsartan  1 tablet Oral BID Jaimie Base, PA-C      sertraline  100 mg Oral Daily Jaimie Base, PA-C      sodium chloride  50 mL/hr Intravenous Continuous Carla Pay, CRNP      timolol  1 drop Both Eyes BID Jaimie Base, PA-C       hydrALAZINE, 5 mg, Q4H PRN  ondansetron, 4 mg, Q6H PRN  oxyCODONE, 2 5 mg, Q4H PRN  oxyCODONE, 5 mg, Q4H PRN      No Known Allergies    VTE Pharmacologic Prophylaxis:   Sequential compression device (Venodyne)     Labs:   Troponins:  Results from last 7 days   Lab Units 02/04/22  1005   CK TOTAL U/L 129 0   HS TNI RAND ng/L 17     CBC with diff:  Results from last 7 days   Lab Units 02/06/22  0517 02/05/22  0530 02/04/22  1005   WBC Thousand/uL 9 09 9 67 14 00*   HEMOGLOBIN g/dL 10 4* 12 1 14 5   HEMATOCRIT % 32 2* 36 0 42 8   MCV fL 96 92 89   PLATELETS Thousands/uL 195 178 221   MCH pg 30 9 30 9 30 3   MCHC g/dL 32 3 33 6 33 9   RDW % 14 1 13 8 13 2   MPV fL 10 2 9 9 9 6   NRBC AUTO /100 WBCs  --   --  0     CMP:  Results from last 7 days   Lab Units 02/06/22  0517 02/05/22  0530 02/04/22  1005   SODIUM mmol/L 136 138 137   POTASSIUM mmol/L 4 1 4 0 3 5   CHLORIDE mmol/L 105 105 101   CO2 mmol/L 25 25 21*   ANION GAP mmol/L 6 8 15*   BUN mg/dL 44* 32* 23*   CREATININE mg/dL 1 79* 1 51* 1 36*   CALCIUM mg/dL 8 1* 9 1 9 9   AST U/L 129*  --  97*   ALT U/L 21  --  18   ALK PHOS U/L 62  --  76 8   TOTAL PROTEIN g/dL 5 9*  --  7 8   ALBUMIN g/dL 2 6*  --  4 3   TOTAL BILIRUBIN mg/dL 1 09*  --  1 07   EGFR ml/min/1 73sq m 26 31 36     Magnesium:  Results from last 7 days   Lab Units 02/05/22  0530   MAGNESIUM mg/dL 2 0     Coags:  Results from last 7 days   Lab Units 02/04/22  1004   INR  1 03       Imaging & Testing   I have personally reviewed pertinent reports  XR chest portable    Result Date: 2/4/2022  Narrative: CHEST INDICATION:   fall  COMPARISON:  None EXAM PERFORMED/VIEWS:  XR CHEST PORTABLE FINDINGS: Cardiomediastinal silhouette appears unremarkable  The lungs are clear  No pneumothorax or pleural effusion  Degenerative change of the shoulders  Impression: No acute cardiopulmonary disease  Workstation performed: [id number]     XR hip/pelv 2-3 vws right    Result Date: 2/4/2022  Narrative: RIGHT HIP AND RIGHT Maryetta Roam femur INDICATION:   fall  COMPARISON:  Radiographs of the abdomen April 30, 2018   VIEWS:  XR HIP/PELV 2-3 VWS RIGHT W PELVIS IF PERFORMED, XR FEMUR 2 VW RIGHT FINDINGS: Acute intertrochanteric fracture of the right femoral neck with involvement of the greater and lesser trochanters and foreshortening of the femoral shaft  Irregularity of the superior and inferior left pubic rami  Evaluation of the sacrum is limited due to overlying bowel gas and stool  Mild bilateral hip osteoarthrosis  No lytic or blastic osseous lesion  Soft tissues are unremarkable  Impression: 1  Acute intertrochanteric right femoral neck fracture  2   Irregularities of the left superior and inferior pubic rami likely reflects sequelae of prior injury  Correlation with tenderness at this site is advised to assess for acute injury  Workstation performed: [id number]     XR femur 2 views RIGHT    Result Date: 2/4/2022  Narrative: RIGHT HIP AND RIGHT Kehinde Lux femur INDICATION:   fall  COMPARISON:  Radiographs of the abdomen April 30, 2018  VIEWS:  XR HIP/PELV 2-3 VWS RIGHT W PELVIS IF PERFORMED, XR FEMUR 2 VW RIGHT FINDINGS: Acute intertrochanteric fracture of the right femoral neck with involvement of the greater and lesser trochanters and foreshortening of the femoral shaft  Irregularity of the superior and inferior left pubic rami  Evaluation of the sacrum is limited due to overlying bowel gas and stool  Mild bilateral hip osteoarthrosis  No lytic or blastic osseous lesion  Soft tissues are unremarkable  Impression: 1  Acute intertrochanteric right femoral neck fracture  2   Irregularities of the left superior and inferior pubic rami likely reflects sequelae of prior injury  Correlation with tenderness at this site is advised to assess for acute injury  Workstation performed: [id number]     CT head wo contrast    Result Date: 2/4/2022  Narrative: CT BRAIN - WITHOUT CONTRAST INDICATION:   Fall yesterday    COMPARISON:  None  TECHNIQUE:  CT examination of the brain was performed  In addition to axial images, sagittal and coronal 2D reformatted images were created and submitted for interpretation   Radiation dose length product (DLP) for this visit:  800 mGy-cm   This examination, like all CT scans performed in the [facility], was performed utilizing techniques to minimize radiation dose exposure, including the use of iterative reconstruction and automated exposure control  IMAGE QUALITY:  Diagnostic  FINDINGS: PARENCHYMA: Decreased attenuation is noted in periventricular and subcortical white matter demonstrating an appearance that is statistically most likely to represent mild microangiopathic change  Chronic lacunar infarction(s) are noted in basal ganglia/thalami  No CT signs of acute infarction  No intracranial mass, mass effect or midline shift  No acute parenchymal hemorrhage  VENTRICLES AND EXTRA-AXIAL SPACES:  Normal for the patient's age  VISUALIZED ORBITS AND PARANASAL SINUSES:  Unremarkable  CALVARIUM AND EXTRACRANIAL SOFT TISSUES:  Normal      Impression: No acute intracranial abnormality  Workstation performed: [id number]          Valentino Church, [place]  Cardiology      "This note has been constructed using a voice recognition system  Therefore there may be syntax, spelling, and/or grammatical errors   Please call if you have any questions  "

## 2022-02-06 NOTE — PHYSICAL THERAPY NOTE
PHYSICAL THERAPY EVALUATION/TREATMENT     02/06/22 1055   Note Type   Note type Evaluation   Pain Assessment   Pain Assessment Tool Corey-Baker FACES   Corey-Baker FACES Pain Rating 8  (Right hip area)   Home Living   Type of Home Assisted living   [address] [place] [address],[address]   Additional Comments Patient is a poor historian as per documentation patient using walker and assist living prior to admission   Prior Function   Lives With Facility staff   Receives Help From Personal care attendant   ADL Assistance Needs assistance   IADLs Needs assistance   Falls in the last 6 months 1 to 4   Comments Patient with dementia   General   Additional Pertinent History Chart reviewed patient admitted with femoral neck fracture now status post IM nail on 02/5/22   Family/Caregiver Present No   Cognition   Overall Cognitive Status Impaired   Arousal/Participation Cooperative   Attention Attends with cues to redirect   Orientation Level Oriented to person   Following Commands Follows one step commands with increased time or repetition   Subjective   Subjective Patient confused unable to state a specific location of pain   RLE Assessment   RLE Assessment   (ROM WFL, strength 2-/5)   LLE Assessment   LLE Assessment   (RROM WFL, strength 3/5 diff to assess,not following commands)   Coordination   Movements are Fluid and Coordinated 0   Bed Mobility   Additional Comments Patient sitting in bedside chair upon arrival by therapist   Transfers   Sit to Stand 2  Maximal assistance   Stand to Sit 2  Maximal assistance   Stand pivot 2  Maximal assistance   Ambulation/Elevation   Gait Assistance 2  Maximal assist   Additional items Assist x 2   Assistive Device Rolling walker   Distance 1 to 2 steps at bedside with max assist for weight shifting to advance LEs   Balance   Static Sitting Poor +   Dynamic Sitting Poor   Static Standing Poor -   Dynamic Standing Poor -   Ambulatory Poor -   Activity Tolerance   Activity Tolerance Patient limited by fatigue;Patient limited by pain  (Limited by cognition)   Nurse Made Aware yes   Assessment   Prognosis Good   Problem List Decreased strength;Decreased range of motion;Decreased endurance; Impaired balance;Decreased mobility; Decreased coordination;Decreased cognition; Impaired judgement;Decreased safety awareness;Pain   Assessment Patient seen for Physical Therapy evaluation  Patient admitted with Femoral neck fracture ([facility] [address] )  Comorbidities affecting patient's physical performance include:arthritis, chronic back pain, dementia, glaucoma and hypertension and tremors of the nervous system   Personal factors affecting patient at time of initial evaluation include: ambulating with assistive device, inability to ambulate household distances, inability to navigate community distances, inability to navigate level surfaces without external assistance, inability to perform dynamic tasks in community, decreased cognition, positive fall history, inability to perform physical activity, limited insight into impairments, inability to perform ADLS, inability to perform IADLS  and inability to live alone  Prior to admission, patient was requiring assist for ADLS, requiring assist for IADLS, ambulating household distance and an assisted living resident  Please find objective findings from Physical Therapy assessment regarding body systems outlined above with impairments and limitations including weakness, decreased ROM, impaired balance, decreased endurance, impaired coordination, gait deviations, pain, decreased activity tolerance, decreased functional mobility tolerance, decreased safety awareness, impaired judgement, fall risk and decreased cognition  The Barthel Index was used as a functional outcome tool presenting with a score of 15 today indicating marked limitations of functional mobility and ADLS    Patient's clinical presentation is currently unstable/unpredictable as seen in patient's presentation of vital sign response, changing level of pain, varying levels of cognitive performance, increased fall risk, new onset of impairment of functional mobility, decreased endurance and new onset of weakness  Pt would benefit from continued Physical Therapy treatment to address deficits as defined above and maximize level of functional mobility  As demonstrated by objective findings, the assigned level of complexity for this evaluation is high  The patient's AM-[facility] Basic Mobility Inpatient Short Form Raw Score is 9  A Raw score of less than or equal to 16 suggests the patient may benefit from discharge to post-acute rehabilitation services  Please also refer to the recommendation of the Physical Therapist for safe discharge planning  Goals   Patient Goals None stated patient confused   STG Expiration Date 02/13/22   Short Term Goal #1 Transfers and gait with roller walker with mod assist    Short Term Goal #2 gait endurance to 30 feet   LTG Expiration Date 02/20/22   Long Term Goal #1 Strength bilateral lower extremities 3 /3+   Long Term Goal #2 Transfers and gait with Min assist   Plan   Treatment/Interventions ADL retraining;Functional transfer training;LE strengthening/ROM; Therapeutic exercise; Endurance training;Cognitive reorientation;Patient/family training;Bed mobility; Equipment eval/education;Gait training; Compensatory technique education   PT Frequency Other (Comment)  (daily)   Recommendation   PT Discharge Recommendation Post acute rehabilitation services   AM-PAC Basic Mobility Inpatient   Turning in Bed Without Bedrails 2   Lying on Back to Sitting on Edge of Flat Bed 2   Moving Bed to Chair 1   Standing Up From Chair 2   Walk in Room 1   Climb 3-5 Stairs 1   Basic Mobility Inpatient Raw Score 9   Turning Head Towards Sound 2   Follow Simple Instructions 2   Low Function Basic Mobility Raw Score 13   Low Function Basic Mobility Standardized Score 20 14   Highest Level Of Mobility   [facility] Goal 3: Sit at edge of bed [facility] Highest Level of Mobility 4: Move to chair/commode   -HL Goal Achieved Yes   Barthel Index   Feeding 0   Bathing 0   Grooming Score 0   Dressing Score 0   Bladder Score 0   Bowels Score 0   Toilet Use Score 5   Transfers (Bed/Chair) Score 5   Mobility (Level Surface) Score 0   Stairs Score 5   Barthel Index Score 15   Additional Treatment Session   Start Time 1040   End Time 1055   Treatment Assessment S:  Patient confused O:  Sit to and from stand completed x3 for strength and balance, bilateral lower extremity exercise completed active assistively a:  Patient will benefit continued physical therapy with progression as tolerated   Exercises   Heelslides Sitting;5 reps;Bilateral   Hip Flexion Sitting;5 reps;AAROM;AROM; Bilateral   Hip Abduction Sitting;5 reps;AAROM;AROM; Bilateral   Knee AROM Long Arc Quad Sitting;5 reps;AAROM;AROM; Bilateral   Ankle Pumps Sitting;5 reps;AAROM;AROM; Bilateral   Licensure   [place] License Number  Shawn Germain PT [id number]

## 2022-02-06 NOTE — PLAN OF CARE
Problem: Nutrition/Hydration-ADULT  Goal: Nutrient/Hydration intake appropriate for improving, restoring or maintaining nutritional needs  Description: Monitor and assess patient's nutrition/hydration status for malnutrition  Collaborate with interdisciplinary team and initiate plan and interventions as ordered  Monitor patient's weight and dietary intake as ordered or per policy  Utilize nutrition screening tool and intervene as necessary  Determine patient's food preferences and provide high-protein, high-caloric foods as appropriate       INTERVENTIONS:  - Monitor oral intake, urinary output, labs, and treatment plans  - Assess nutrition and hydration status and recommend course of action  - Evaluate amount of meals eaten  - Assist patient with eating if necessary   - Allow adequate time for meals  - Recommend/ encourage appropriate diets, oral nutritional supplements, and vitamin/mineral supplements  - Order, calculate, and assess calorie counts as needed  - Recommend, monitor, and adjust tube feedings and TPN/PPN based on assessed needs  - Assess need for intravenous fluids  - Provide specific nutrition/hydration education as appropriate  - Include patient/family/caregiver in decisions related to nutrition  Outcome: Progressing     Problem: MUSCULOSKELETAL - ADULT  Goal: Maintain or return mobility to safest level of function  Description: INTERVENTIONS:  - Assess patient's ability to carry out ADLs; assess patient's baseline for ADL function and identify physical deficits which impact ability to perform ADLs (bathing, care of mouth/teeth, toileting, grooming, dressing, etc )  - Assess/evaluate cause of self-care deficits   - Assess range of motion  - Assess patient's mobility  - Assess patient's need for assistive devices and provide as appropriate  - Encourage maximum independence but intervene and supervise when necessary  - Involve family in performance of ADLs  - Assess for home care needs following discharge   - Consider OT consult to assist with ADL evaluation and planning for discharge  - Provide patient education as appropriate  Outcome: Progressing  Goal: Maintain proper alignment of affected body part  Description: INTERVENTIONS:  - Support, maintain and protect limb and body alignment  - Provide patient/ family with appropriate education  Outcome: Progressing     Problem: MOBILITY - ADULT  Goal: Maintain or return to baseline ADL function  Description: INTERVENTIONS:  -  Assess patient's ability to carry out ADLs; assess patient's baseline for ADL function and identify physical deficits which impact ability to perform ADLs (bathing, care of mouth/teeth, toileting, grooming, dressing, etc )  - Assess/evaluate cause of self-care deficits   - Assess range of motion  - Assess patient's mobility; develop plan if impaired  - Assess patient's need for assistive devices and provide as appropriate  - Encourage maximum independence but intervene and supervise when necessary  - Involve family in performance of ADLs  - Assess for home care needs following discharge   - Consider OT consult to assist with ADL evaluation and planning for discharge  - Provide patient education as appropriate  Outcome: Progressing  Goal: Maintains/Returns to pre admission functional level  Description: INTERVENTIONS:  - Perform BMAT or MOVE assessment daily    - Set and communicate daily mobility goal to care team and patient/family/caregiver  - Collaborate with rehabilitation services on mobility goals if consulted  - Perform Range of Motion 3 times a day  - Reposition patient every 2 hours    - Dangle patient 2 times a day  -   Problem: Potential for Falls  Goal: Patient will remain free of falls  Description: INTERVENTIONS:  - Educate patient/family on patient safety including physical limitations  - Instruct patient to call for assistance with activity   - Consult OT/PT to assist with strengthening/mobility   - Keep Call Shila Wise within reach  - Keep bed low and locked with side rails adjusted as appropriate  - Keep care items and personal belongings within reach  - Initiate and maintain comfort rounds  - Make Fall Risk Sign visible to staff  - Offer Toileting every 2Hours, in advance of need  - Initiate/Maintain bed alarm  - Obtain necessary fall risk management equipment:- Apply yellow socks and bracelet for high fall risk patients  - Consider moving patient to room near nurses station  Outcome: Progressing   - Out of bed for toileting  - Record patient progress and toleration of activity level   Outcome: Progressing

## 2022-02-06 NOTE — PROGRESS NOTES
Richelle 44  Progress Note - Mehdi Acosta 1940, 79 y o  female MRN: [id number]  Unit/Bed#: [address] [place] [address] Encounter: [id number]  Primary Care Provider: Jayme Walsh MD   Date and time admitted to hospital: 2/4/2022  8:33 PM    * Femoral neck fracture [facility])  Assessment & Plan  · Right intertrochanteric femur fracture status post ORIF  · Will schedule tylenol  Oxycodone for severe/breakthrough pain  · Therapy recommending rehab  · Medication reconciliation:  Attempted to call [facility] however main office is closed on the weekend  Does not appear she is on diltiazem based on [facility] records  Will need to call to confirm medications prior to discharge    Essential hypertension  Assessment & Plan  · Continue metoprolol  Amlodipine discontinued due to lower blood pressures    Stage 3a chronic kidney disease (HCC)  Assessment & Plan  · Mild renal insufficiency  Holding diuretics    Results from last 7 days   Lab Units 02/06/22  0517 02/05/22  0530 02/04/22  1005   BUN mg/dL 44* 32* 23*   CREATININE mg/dL 1 79* 1 51* 1 36*   EGFR ml/min/1 73sq m 26 31 36       Chronic combined systolic and diastolic CHF (congestive heart failure) (HCC)  Assessment & Plan  Wt Readings from Last 3 Encounters:   02/04/22 69 1 kg (152 lb 4 8 oz)   02/04/22 61 2 kg (135 lb)   11/05/20 55 3 kg (122 lb)     · Compensated if not dried  Continue metoprolol and entresto   · Restart diuretics when renal function improved    Alzheimer's dementia without behavioral disturbance (HCC)  Assessment & Plan  · Mentation stable continue donepezil      VTE Pharmacologic Prophylaxis:   High Risk (Score >/= 5) - Pharmacological DVT Prophylaxis Ordered: Heparin  Sequential Compression Devices Ordered  Patient Centered Rounds: I have performed bedside rounds with nursing staff today    Discussions with Specialists or Other Care Team Provider:  Cardiology    Education and Discussions with Family / Patient:  Daughter on telephone    Time Spent for Care: 25 mins  More than 50% of total time spent on counseling and coordination of care as described above  Current Length of Stay: 2 day(s)  Current Patient Status: Inpatient   Certification Statement: The patient will continue to require additional inpatient hospital stay due to need for rehab  Discharge Plan / Estimated Discharge Date: Anticipate discharge in 48-72 hrs to rehab facility  Code Status: Level 3 - DNAR and DNI      Subjective:   Patient seen and examined  Complaining of right hip pain after working with PT this morning    Objective:   Vitals: Blood pressure 117/55, pulse 76, temperature 98 °F (36 7 °C), resp  rate 18, height 5' 8" (1 727 m), weight 69 1 kg (152 lb 4 8 oz), SpO2 99 %  Physical Exam  Vitals reviewed  Constitutional:       General: She is not in acute distress  Appearance: Normal appearance  HENT:      Head: Atraumatic  Eyes:      General: No scleral icterus  Cardiovascular:      Rate and Rhythm: Regular rhythm  Pulmonary:      Breath sounds: No wheezing  Abdominal:      General: Bowel sounds are normal       Palpations: Abdomen is soft  Tenderness: There is no guarding or rebound  Musculoskeletal:         General: Tenderness present  No swelling  Skin:     General: Skin is warm  Neurological:      Mental Status: She is alert  Mental status is at baseline  She is disoriented     Psychiatric:         Mood and Affect: Mood normal        Additional Data:   Labs:  Results from last 7 days   Lab Units 02/06/22 0517 02/05/22  0530 02/04/22  1005 02/04/22  1004   WBC Thousand/uL 9 09 9 67 14 00*  --    HEMOGLOBIN g/dL 10 4* 12 1 14 5  --    HEMATOCRIT % 32 2* 36 0 42 8  --    MCV fL 96 92 89  --    PLATELETS Thousands/uL 195 178 221  --    INR   --   --   --  1 03     Results from last 7 days   Lab Units 02/06/22 0517 02/05/22  0530 02/04/22  1005   SODIUM mmol/L 136 138 137   POTASSIUM mmol/L 4 1 4 0 3 5   CHLORIDE mmol/L 105 105 101   CO2 mmol/L 25 25 21*   ANION GAP mmol/L 6 8 15*   BUN mg/dL 44* 32* 23*   CREATININE mg/dL 1 79* 1 51* 1 36*   CALCIUM mg/dL 8 1* 9 1 9 9   ALBUMIN g/dL 2 6*  --  4 3   TOTAL BILIRUBIN mg/dL 1 09*  --  1 07   ALK PHOS U/L 62  --  76 8   ALT U/L 21  --  18   AST U/L 129*  --  97*   EGFR ml/min/1 73sq m 26 31 36   GLUCOSE RANDOM mg/dL 111 99 160*     Results from last 7 days   Lab Units 02/05/22  0530   MAGNESIUM mg/dL 2 0     Results from last 7 days   Lab Units 02/04/22  1005   CK TOTAL U/L 129 0                  Results from last 7 days   Lab Units 02/06/22  1105 02/06/22  0650 02/05/22  2051 02/05/22  1539 02/05/22  0656   POC GLUCOSE mg/dl 133 120 116 121 105             * I Have Reviewed All Lab Data Listed Above  Cultures:   Results from last 7 days   Lab Units 02/04/22  1005   INFLUENZA A PCR  Negative       Results from last 7 days   Lab Units 02/04/22  1005   SARS-COV-2  Negative   INFLUENZA A PCR  Negative   INFLUENZA B PCR  Negative   RSV PCR  Negative           Lines/Drains:  Invasive Devices  Report    Peripheral Intravenous Line            Peripheral IV 02/06/22 Distal;Dorsal (posterior); Right Forearm <1 day          Drain            Urethral Catheter 18 Fr  1 day              Telemetry:      Imaging:  Imaging Reports Reviewed Today Include:   XR chest portable    Result Date: 2/4/2022  Impression: No acute cardiopulmonary disease  Workstation performed: [id number]     XR hip/pelv 2-3 vws right    Result Date: 2/4/2022  Impression: 1  Acute intertrochanteric right femoral neck fracture  2   Irregularities of the left superior and inferior pubic rami likely reflects sequelae of prior injury  Correlation with tenderness at this site is advised to assess for acute injury  Workstation performed: [id number]     XR femur 2 views RIGHT    Result Date: 2/4/2022  Impression: 1  Acute intertrochanteric right femoral neck fracture   2   Irregularities of the left superior and inferior pubic rami likely reflects sequelae of prior injury  Correlation with tenderness at this site is advised to assess for acute injury  Workstation performed: [id number]     CT head wo contrast    Result Date: 2/4/2022  Impression: No acute intracranial abnormality  Workstation performed: [id number]       Scheduled Meds:  Current Facility-Administered Medications   Medication Dose Route Frequency Provider Last Rate    donepezil  10 mg Oral HS Classie Osgood, PA-C      heparin (porcine)  5,000 Units Subcutaneous Q8H Max Gianotti, PA-C      hydrALAZINE  5 mg Intravenous Q4H PRN Classie Osgood, PA-C      latanoprost  1 drop Both Eyes HS Classie Osgood, PA-C      metoprolol succinate  25 mg Oral Daily Classie Osgood, PA-C      ondansetron  4 mg Intravenous Q6H PRN Valentino Camden, CRNP      oxyCODONE  2 5 mg Oral Q4H PRN Classie Osgood, PA-C      oxyCODONE  5 mg Oral Q4H PRN Classie Osgood, PA-C      pantoprazole  40 mg Oral Early Morning Classie Osgood, PA-C      potassium chloride  10 mEq Oral BID Classie Osgood, PA-C      QUEtiapine  25 mg Oral BID Classie Osgood, PA-C      sacubitril-valsartan  1 tablet Oral BID Valentino Camden, CRNP      sertraline  100 mg Oral Daily Classie Osgood, PA-C      sodium chloride  50 mL/hr Intravenous Continuous Valentino Camden, CRNP 50 mL/hr (02/06/22 0757)    timolol  1 drop Both Eyes BID Classie Osgood, PA-C         Today, Patient Was Seen By: Beti Garcia DO    ** Please Note: Dictation voice to text software may have been used in the creation of this document   **

## 2022-02-07 ENCOUNTER — APPOINTMENT (INPATIENT)
Dept: NON INVASIVE DIAGNOSTICS | Facility: HOSPITAL | Age: 82
DRG: 481 | End: 2022-02-07
Payer: COMMERCIAL

## 2022-02-07 ENCOUNTER — APPOINTMENT (INPATIENT)
Dept: RADIOLOGY | Facility: HOSPITAL | Age: 82
DRG: 481 | End: 2022-02-07
Payer: COMMERCIAL

## 2022-02-07 PROBLEM — N18.9 ACUTE KIDNEY INJURY SUPERIMPOSED ON CKD (HCC): Status: ACTIVE | Noted: 2022-02-07

## 2022-02-07 PROBLEM — N17.9 ACUTE KIDNEY INJURY (HCC): Status: ACTIVE | Noted: 2022-02-07

## 2022-02-07 PROBLEM — D64.9 ANEMIA: Status: ACTIVE | Noted: 2022-02-07

## 2022-02-07 LAB
ALBUMIN SERPL BCP-MCNC: 2.6 G/DL (ref 3.5–5)
ALP SERPL-CCNC: 70 U/L (ref 46–116)
ALT SERPL W P-5'-P-CCNC: 17 U/L (ref 12–78)
ANION GAP SERPL CALCULATED.3IONS-SCNC: 11 MMOL/L (ref 4–13)
AST SERPL W P-5'-P-CCNC: 155 U/L (ref 5–45)
BILIRUB SERPL-MCNC: 1.08 MG/DL (ref 0.2–1)
BUN SERPL-MCNC: 75 MG/DL (ref 5–25)
CALCIUM ALBUM COR SERPL-MCNC: 9.7 MG/DL (ref 8.3–10.1)
CALCIUM SERPL-MCNC: 8.6 MG/DL (ref 8.3–10.1)
CHLORIDE SERPL-SCNC: 107 MMOL/L (ref 100–108)
CO2 SERPL-SCNC: 22 MMOL/L (ref 21–32)
CREAT SERPL-MCNC: 2.81 MG/DL (ref 0.6–1.3)
ERYTHROCYTE [DISTWIDTH] IN BLOOD BY AUTOMATED COUNT: 14.4 % (ref 11.6–15.1)
GFR SERPL CREATININE-BSD FRML MDRD: 15 ML/MIN/1.73SQ M
GLUCOSE SERPL-MCNC: 111 MG/DL (ref 65–140)
HCT VFR BLD AUTO: 33 % (ref 34.8–46.1)
HGB BLD-MCNC: 10.4 G/DL (ref 11.5–15.4)
MCH RBC QN AUTO: 30.6 PG (ref 26.8–34.3)
MCHC RBC AUTO-ENTMCNC: 31.5 G/DL (ref 31.4–37.4)
MCV RBC AUTO: 97 FL (ref 82–98)
PLATELET # BLD AUTO: 204 THOUSANDS/UL (ref 149–390)
PMV BLD AUTO: 10.4 FL (ref 8.9–12.7)
POTASSIUM SERPL-SCNC: 4.9 MMOL/L (ref 3.5–5.3)
PROT SERPL-MCNC: 6.3 G/DL (ref 6.4–8.2)
RBC # BLD AUTO: 3.4 MILLION/UL (ref 3.81–5.12)
SODIUM SERPL-SCNC: 140 MMOL/L (ref 136–145)
WBC # BLD AUTO: 12.3 THOUSAND/UL (ref 4.31–10.16)

## 2022-02-07 PROCEDURE — 99232 SBSQ HOSP IP/OBS MODERATE 35: CPT | Performed by: PHYSICIAN ASSISTANT

## 2022-02-07 PROCEDURE — 80053 COMPREHEN METABOLIC PANEL: CPT | Performed by: INTERNAL MEDICINE

## 2022-02-07 PROCEDURE — 97530 THERAPEUTIC ACTIVITIES: CPT

## 2022-02-07 PROCEDURE — 99232 SBSQ HOSP IP/OBS MODERATE 35: CPT | Performed by: INTERNAL MEDICINE

## 2022-02-07 PROCEDURE — 99024 POSTOP FOLLOW-UP VISIT: CPT | Performed by: PHYSICIAN ASSISTANT

## 2022-02-07 PROCEDURE — 92610 EVALUATE SWALLOWING FUNCTION: CPT

## 2022-02-07 PROCEDURE — 85027 COMPLETE CBC AUTOMATED: CPT | Performed by: INTERNAL MEDICINE

## 2022-02-07 PROCEDURE — 93308 TTE F-UP OR LMTD: CPT

## 2022-02-07 PROCEDURE — 99223 1ST HOSP IP/OBS HIGH 75: CPT | Performed by: STUDENT IN AN ORGANIZED HEALTH CARE EDUCATION/TRAINING PROGRAM

## 2022-02-07 PROCEDURE — 93325 DOPPLER ECHO COLOR FLOW MAPG: CPT

## 2022-02-07 PROCEDURE — 93321 DOPPLER ECHO F-UP/LMTD STD: CPT

## 2022-02-07 RX ORDER — SODIUM CHLORIDE, SODIUM GLUCONATE, SODIUM ACETATE, POTASSIUM CHLORIDE, MAGNESIUM CHLORIDE, SODIUM PHOSPHATE, DIBASIC, AND POTASSIUM PHOSPHATE .53; .5; .37; .037; .03; .012; .00082 G/100ML; G/100ML; G/100ML; G/100ML; G/100ML; G/100ML; G/100ML
75 INJECTION, SOLUTION INTRAVENOUS CONTINUOUS
Status: DISCONTINUED | OUTPATIENT
Start: 2022-02-07 | End: 2022-02-08

## 2022-02-07 RX ADMIN — LATANOPROST 1 DROP: 50 SOLUTION/ DROPS OPHTHALMIC at 21:57

## 2022-02-07 RX ADMIN — HEPARIN SODIUM 5000 UNITS: 5000 INJECTION INTRAVENOUS; SUBCUTANEOUS at 14:00

## 2022-02-07 RX ADMIN — POTASSIUM CHLORIDE 10 MEQ: 750 TABLET, EXTENDED RELEASE ORAL at 09:30

## 2022-02-07 RX ADMIN — QUETIAPINE FUMARATE 25 MG: 25 TABLET ORAL at 17:45

## 2022-02-07 RX ADMIN — HEPARIN SODIUM 5000 UNITS: 5000 INJECTION INTRAVENOUS; SUBCUTANEOUS at 05:35

## 2022-02-07 RX ADMIN — ACETAMINOPHEN 650 MG: 325 TABLET, FILM COATED ORAL at 09:30

## 2022-02-07 RX ADMIN — TIMOLOL MALEATE 1 DROP: 5 SOLUTION/ DROPS OPHTHALMIC at 17:45

## 2022-02-07 RX ADMIN — ACETAMINOPHEN 650 MG: 325 TABLET, FILM COATED ORAL at 17:45

## 2022-02-07 RX ADMIN — ACETAMINOPHEN 650 MG: 325 TABLET, FILM COATED ORAL at 11:56

## 2022-02-07 RX ADMIN — TIMOLOL MALEATE 1 DROP: 5 SOLUTION/ DROPS OPHTHALMIC at 09:00

## 2022-02-07 RX ADMIN — ACETAMINOPHEN 650 MG: 325 TABLET, FILM COATED ORAL at 21:57

## 2022-02-07 RX ADMIN — METOPROLOL SUCCINATE 25 MG: 25 TABLET, EXTENDED RELEASE ORAL at 09:30

## 2022-02-07 RX ADMIN — HEPARIN SODIUM 5000 UNITS: 5000 INJECTION INTRAVENOUS; SUBCUTANEOUS at 21:57

## 2022-02-07 RX ADMIN — POTASSIUM CHLORIDE 10 MEQ: 750 TABLET, EXTENDED RELEASE ORAL at 17:45

## 2022-02-07 RX ADMIN — DONEPEZIL HYDROCHLORIDE 10 MG: 5 TABLET, FILM COATED ORAL at 21:57

## 2022-02-07 RX ADMIN — QUETIAPINE FUMARATE 25 MG: 25 TABLET ORAL at 09:30

## 2022-02-07 RX ADMIN — SODIUM CHLORIDE, SODIUM GLUCONATE, SODIUM ACETATE, POTASSIUM CHLORIDE AND MAGNESIUM CHLORIDE 75 ML/HR: 526; 502; 368; 37; 30 INJECTION, SOLUTION INTRAVENOUS at 11:54

## 2022-02-07 RX ADMIN — SODIUM CHLORIDE 50 ML/HR: 0.9 INJECTION, SOLUTION INTRAVENOUS at 04:27

## 2022-02-07 RX ADMIN — SERTRALINE HYDROCHLORIDE 100 MG: 100 TABLET ORAL at 09:30

## 2022-02-07 RX ADMIN — PANTOPRAZOLE SODIUM 40 MG: 40 TABLET, DELAYED RELEASE ORAL at 05:35

## 2022-02-07 RX ADMIN — OXYCODONE HYDROCHLORIDE 5 MG: 5 TABLET ORAL at 16:08

## 2022-02-07 NOTE — PLAN OF CARE
Summary   Pt presented as lethargic with s/s suggestive of at least moderate oral/pharyngeal dysphagia        Recommended Diet: puree/level 1 diet and nectar thick liquids   Recommended Form of Meds: crushed with puree   Aspiration precautions and swallowing strategies: upright posture, only feed when fully alert, slow rate of feeding and small bites/sips  Other Recommendations: Continue oral care

## 2022-02-07 NOTE — PHYSICAL THERAPY NOTE
PT TREATMENT     02/07/22 0950   PT Last Visit   PT Visit Date 02/07/22   Note Type   Note Type Treatment   Pain Assessment   Pain Assessment Tool Corey-Baker FACES   Corey-Baker FACES Pain Rating 6  (With mobility)   Restrictions/Precautions   RLE Weight Bearing Per Order WBAT   Other Precautions Fall Risk;Bed Alarm; Chair Alarm;Cognitive;Pain   General   Chart Reviewed Yes   Cognition   Overall Cognitive Status Impaired   Arousal/Participation Lethargic   Attention Difficulty attending to directions   Orientation Level Oriented to person   Following Commands Follows one step commands inconsistently   Subjective   Subjective Pt cries out in pain with mobility otherwise little to no verbalization during PT treatment session   Bed Mobility   Rolling R 2  Maximal assistance   Additional items Assist x 1;Verbal cues   Rolling L 2  Maximal assistance   Additional items Assist x 1;Verbal cues   Supine to Sit 2  Maximal assistance   Additional items Assist x 2;Verbal cues   Sit to Supine 2  Maximal assistance   Additional items Assist x 2;Verbal cues   Additional Comments Multiple rolls in bed due to pt incontinent with BM in need of hygiene - hygiene performed by PT with assistive CNA   Transfers   Sit to Stand 2  Maximal assistance   Additional items Assist x 2;Verbal cues   Stand to Sit 2  Maximal assistance   Additional items Assist x 2;Verbal cues   Stand pivot 2  Maximal assistance  (Bed to chair)   Additional items Assist x 2;Verbal cues  (Max handhold assist of 2)   Balance   Static Sitting Poor +   Activity Tolerance   Activity Tolerance Patient limited by fatigue;Patient limited by pain;Treatment limited secondary to medical complications (Comment)  (Weakness; cognition; P+ sitting balance)   Assessment   Problem List Decreased strength;Decreased range of motion;Decreased endurance; Impaired balance;Decreased mobility; Decreased coordination;Decreased cognition; Impaired judgement;Decreased safety awareness;Pain Assessment Pt with limited tolerance to rolling left and right multiple times, bed mobility, sitting edge of bed and stand pivot transfer out of bed to chair due to impaired cognition and increase lethargy  Pt will continue to benefit from skilled physical therapy services to increase her strength and mobility as able  The patient's AM-PAC Basic Mobility Inpatient Short Form Raw Score is 7  A Raw score of less than or equal to 16 suggests the patient may benefit from discharge to post-acute rehabilitation services  Please also refer to the recommendation of the Physical Therapist for safe discharge planning  Plan   Treatment/Interventions ADL retraining;Functional transfer training;LE strengthening/ROM; Therapeutic exercise; Endurance training;Cognitive reorientation;Patient/family training;Equipment eval/education; Bed mobility;Gait training; Compensatory technique education   PT Frequency Other (Comment)  (Daily)   Recommendation   PT Discharge Recommendation Post acute rehabilitation services   AM-PAC Basic Mobility Inpatient   Turning in Bed Without Bedrails 2   Lying on Back to Sitting on Edge of Flat Bed 1   Moving Bed to Chair 1   Standing Up From Chair 1   Walk in Room 1   Climb 3-5 Stairs 1   Basic Mobility Inpatient Raw Score 7   Turning Head Towards Sound 2   Follow Simple Instructions 2   Low Function Basic Mobility Raw Score 11   Low Function Basic Mobility Standardized Score 16 55   Highest Level Of Mobility   JH-HLM Goal 2: Bed activities/Dependent transfer   JH-HLM Highest Level of Mobility 4: Move to chair/commode   JH-HLM Goal Achieved Yes   Education   Education Provided Mobility training   Patient Reinforcement needed   End of Consult   Patient Position at End of Consult Bed/Chair alarm activated; Bedside chair; All needs within reach   [facility] Insurance Number  Micheal Nguyen PT [id number]     Portions of the documentation may have been created using voice recognition software  Occasional wrong word or sound alike substitutions may have occurred due to the inherent limitation of the voice recognition software  Read the chart carefully and recognize, using context, where substitutions have occurred

## 2022-02-07 NOTE — CASE MANAGEMENT
Case Management Assessment & Discharge Planning Note    Patient name Lexii Mccracken  Location 18 [facility]/ [place] [address] 200 MRN [id number]  : 1940 Date 2022       Current Admission Date: 2022  Current Admission Diagnosis:Femoral neck fracture [facility])   Patient Active Problem List    Diagnosis Date Noted    Femoral neck fracture ([facility] ) 2022    Chronic combined systolic and diastolic CHF (congestive heart failure) ([place] [address] ) 2022    Stage 3a chronic kidney disease ([place] [address] ) 2022    Essential hypertension 2022    Back pain     Medicare annual wellness visit, subsequent 2020    Alzheimer's dementia without behavioral disturbance ([place] [address] ) 2020      LOS (days): 3  Geometric Mean LOS (GMLOS) (days):   Days to GMLOS:     OBJECTIVE:    Risk of Unplanned Readmission Score: 17     Current admission status: Inpatient    Preferred Pharmacy:   [address] [place] [address]  Phone: [phone] Fax: [phone]    Primary Care Provider: Jhonatan Martínez MD    Primary Insurance: [address] [place] [address],[address] [facility]  Secondary Insurance:     ASSESSMENT:  [address] [place] [address] [address] - Daughter   Primary Phone: [phone] (Mobile)               Advance Directives  Does patient have a [address]?: Yes  Does patient have Advance Directives?: Yes  Advance Directives: Living will,Power of  for health care,Power of  for finance  Primary Contact: Yoon Lopes    Readmission Root Cause  30 Day Readmission: No    Patient Information  Admitted from[de-identified] Facility [facility])  Mental Status: Alert,Confused  During Assessment patient was accompanied by: Not accompanied during assessment  Assessment information provided by[de-identified] Daughter  Primary Caregiver: Family  Caregiver's Name[de-identified] Juice Solid Relationship to Patient[de-identified] Family Member (daughter)  Caregiver's Telephone Number[de-identified] [address] of Residence: [address] do you live in?: [place]  Type of Current Residence: Facility,Other (Comment) (Assisted Living)  Living Arrangements: Other (Comment) [facility])    Activities of Daily Living Prior to Admission  Functional Status: Independent  Completes ADLs independently?: Yes  Ambulates independently?: Yes  Does patient use assisted devices?: Yes  Assisted Devices (DME) used: Simona Read  Does patient currently own DME?: Yes  What DME does the patient currently own?: Simona Read  Does patient currently have [address]?: No    Patient Information Continued  Income Source: Pension/senior care  Does patient have prescription coverage?: Yes ([facility])  Does patient receive dialysis treatments?: No    Means of Transportation  Means of Transport to Appts[de-identified] Family transport      DISCHARGE DETAILS:    Discharge planning discussed with[de-identified] Daughter, Jackie Lopes of Choice: Yes  Comments - Freedom of Choice: SW spoke with pt's daughter, Ami Foreman, over phone to assess needs and discuss plans  Conversation was brief since daughter was planning to board plane to fly home  Discussed recommendation for STR placement  Obtained email address to send facility list for daughter to review  Daughter requested medical update  Message sent to [facility] to inform her of request   Offered ongoing support and assistance to daughter  ORQUIDEA will follow to assist with planning    CM contacted family/caregiver?: Yes  Were Treatment Team discharge recommendations reviewed with patient/caregiver?: Yes  Did patient/caregiver verbalize understanding of patient care needs?: Yes  Were patient/caregiver advised of the risks associated with not following Treatment Team discharge recommendations?: Yes    Contacts  Patient Contacts: Iliana Connolly  Relationship to Patient[de-identified] Family (daughter)  Contact Method: Phone  Phone Number: [phone]  Reason/Outcome: Discharge Mansi Swain         Is the patient interested in [facility] at discharge?: No    DME Referral Provided  Referral made for DME?: No    Other Referral/Resources/Interventions Provided:  Interventions: Short Term Rehab  Referral Comments: List of facilities emailed to daughter    Referrals will be made after daughter has opportunity to review list     Treatment Team Recommendation: Short Term Rehab  Discharge Destination Plan[de-identified] Short Term Rehab  Transport at Discharge : S Ambulance

## 2022-02-07 NOTE — ASSESSMENT & PLAN NOTE
Wt Readings from Last 3 Encounters:   02/07/22 68 9 kg (152 lb)   02/04/22 61 2 kg (135 lb)   11/05/20 55 3 kg (122 lb)     · Compensated if not dried  · EF on prior echo at  11/2021 shows EF 60-65%  · Continue Toprol XL 25mg daily   · Lasix and Entresto on hold per cardiology due to DUARTE   · Monitor I/O, daily weights  · Restart diuretics when renal function improved  · Confirmed home meds with [facility] [place] 3 today: lasix 20mg daily, KCl 10 mEq daily, Entresto BID, Toprol XL 25mg daily

## 2022-02-07 NOTE — CONSULTS
Consultation - Nephrology   Lexii Mccracken 79 y o  female MRN: [id number]  Unit/Bed#: [address] [address] Encounter: [id number]    ASSESSMENT AND PLAN:   79 y o  woman PMH of CKD G3b (baseline creatinine 1 3-1 4 mg/dL), HTB, CHF, Alzheimer's dementia p/s right femoral fracture s/p intertrochanteric nailing  Nephrology is consulted for DUARTE    PLAN    #Non-Oliguric KDIGO DUARTE stage 1   Etiology:  Likely secondary to hemodynamic changes in the settings of hypotension and Entresto and poor oral intake   Baseline creatinine 1 3-1 4 mg/dL   Peak creatinine:  Trending up   Current creatinine:2 8 mg/dL, trending up   UA:  Ordered   Renal imaging :  Order to rule out obstruction   Treatment:   No indication of urgent dialysis at this time   Maintain MAP:  Over 65 mmHg if possible/avoid hypoperfusion:  Hold parameters on blood pressure medications   Avoid nephrotoxic agents such as NSAIDs, and IV contrast if possible   Avoid opioids    Adjust medications to GFR   Agree with holding Entresto   On gentle hydration, would to more of physiologic IV fluids   Start Plasmalyte 75 mL/hour     #CKD G3b  · Baseline creatinine:  1 3-1 4 mg/dL  · Etiology:  Likely secondary to nephroangiosclerosis secondary to hypertension and nephron loss due to aging       #Acid-base Disorder   serum HCO3 22mmol/l, goal>21   Mild high end up metabolic acidosis secondary to DUARTE   A 5 corrected by albumin   No indication of sodium bicarb at this time, will continue to monitor bicarbonate    #Volume status/hypertension:   Volume:  Slightly hypovolemic   Blood pressure:  Normotensive 123/55mmHg, goal <140/90mmhg    Recommend:   Holding Entresto   Low-sodium diet   IV fluids as above    #Anemia:   Current hemoglobin:  10 4 mg/dL   Etiology:  Secondary to blood loss in recent surgery   Treatment:   Transfuse for hemoglobin less than 7 0 per primary service      # femoral fracture  · S/p intervention  · Management as per primary team    HISTORY OF PRESENT ILLNESS:  Requesting Physician: Ariel Corea DO  Reason for Consult: DUARTE    Alondra Yusuf is a 79 y o  female with PMH of CKD G3b (baseline creatinine 1 3-1 4 mg/dL), hypertension, CHF, Alzheimer's dementia who was admitted to [facility] [place] [facility] after presenting with right femoral fracture s/p POD #2 intertrochanteric nailing  Admission complicated with elevated creatinine  A renal consultation is requested today for assistance in the management of DUARTE    PAST MEDICAL HISTORY:  Past Medical History:   Diagnosis Date    Arthritis     Back pain     Dementia ([address] )     Glaucoma     Hypertension     Psychiatric disorder     depression    Tremors of nervous system        PAST SURGICAL HISTORY:  Past Surgical History:   Procedure Laterality Date    HYSTERECTOMY         ALLERGIES:  No Known Allergies    SOCIAL HISTORY:  Social History     Substance and Sexual Activity   Alcohol Use Not Currently     Social History     Substance and Sexual Activity   Drug Use Never     Social History     Tobacco Use   Smoking Status Current Some Day Smoker    Types: Cigarettes   Smokeless Tobacco Never Used       FAMILY HISTORY:  History reviewed  No pertinent family history      MEDICATIONS:    Current Facility-Administered Medications:     acetaminophen (TYLENOL) tablet 650 mg, 650 mg, Oral, 4x Daily, Tom Caballero DO, 650 mg at 02/06/22 2152    donepezil (ARICEPT) tablet 10 mg, 10 mg, Oral, HS, Elana Enriquez PA-C, 10 mg at 02/06/22 2152    heparin (porcine) subcutaneous injection 5,000 Units, 5,000 Units, Subcutaneous, Q8H [facility], Elana Enriquez PA-C, 5,000 Units at 02/07/22 0535    hydrALAZINE (APRESOLINE) injection 5 mg, 5 mg, Intravenous, Q4H PRN, Elana Enriquez PA-C, 5 mg at 02/04/22 2330    latanoprost (XALATAN) 0 005 % ophthalmic solution 1 drop, 1 drop, Both Eyes, HS, Elana Enriquez PA-C, 1 drop at 02/06/22 2154    metoprolol succinate (TOPROL-XL) 24 hr tablet 25 mg, 25 mg, Oral, Daily, Cece Narayan PA-C, 25 mg at 02/06/22 0809    ondansetron (ZOFRAN) injection 4 mg, 4 mg, Intravenous, Q6H PRN, INA Dean    oxyCODONE (ROXICODONE) IR tablet 2 5 mg, 2 5 mg, Oral, Q4H PRN, Cece Narayan PA-C    oxyCODONE (ROXICODONE) IR tablet 5 mg, 5 mg, Oral, Q4H PRN, Cece Narayan PA-C, 5 mg at 02/06/22 1456    pantoprazole (PROTONIX) EC tablet 40 mg, 40 mg, Oral, Early Morning, Cece Narayan PA-C, 40 mg at 02/07/22 0535    potassium chloride (K-DUR,KLOR-CON) CR tablet 10 mEq, 10 mEq, Oral, BID, Cece Narayan PA-C, 10 mEq at 02/06/22 1736    QUEtiapine (SEROquel) tablet 25 mg, 25 mg, Oral, BID, Cece Narayan PA-C, 25 mg at 02/06/22 1736    sertraline (ZOLOFT) tablet 100 mg, 100 mg, Oral, Daily, Cece Narayan PA-C, 100 mg at 02/06/22 0808    sodium chloride 0 9 % infusion, 50 mL/hr, Intravenous, Continuous, INA Dean, Last Rate: 50 mL/hr at 02/07/22 0427, 50 mL/hr at 02/07/22 0427    timolol (TIMOPTIC) 0 5 % ophthalmic solution 1 drop, 1 drop, Both Eyes, BID, Cece Narayan PA-C, 1 drop at 02/06/22 1736    REVIEW OF SYSTEMS:  Complete 10 point review of systems were obtained and discussed in length with the patient  Complete review of systems were negative / unremarkable except mentioned in the HPI section       ROS unable to assess due to altered mental status    PHYSICAL EXAM:  Current Weight: Weight - Scale: 68 9 kg (152 lb)  First Weight: Weight - Scale: 69 1 kg (152 lb 4 8 oz)  Vitals:    02/07/22 0745   BP: 123/55   Pulse: 63   Resp: 18   Temp: 97 8 °F (36 6 °C)   SpO2: 97%       Intake/Output Summary (Last 24 hours) at 2/7/2022 0834  Last data filed at 2/6/2022 1230  Gross per 24 hour   Intake --   Output 150 ml   Net -150 ml     Wt Readings from Last 3 Encounters:   02/07/22 68 9 kg (152 lb)   02/04/22 61 2 kg (135 lb)   11/05/20 55 3 kg (122 lb)     Temp Readings from Last 3 Encounters:   02/07/22 97 8 °F (36 6 °C) (Axillary)   02/04/22 (!) 97 3 °F (36 3 °C) (Oral)   11/05/20 (!) 96 5 °F (35 8 °C)     BP Readings from Last 3 Encounters:   02/07/22 123/55   02/04/22 161/74   11/05/20 116/68     Pulse Readings from Last 3 Encounters:   02/07/22 63   02/04/22 98   11/05/20 82        General:  Elderly, no acute distress at this time  Skin:  No acute rash  Eyes:  No scleral icterus and noninjected  ENT:  mucous membranes moist  Neck:   no jugular venous distention, no carotid bruits,   Chest:  Clear to auscultation percussion, good respiratory effort, no use of accessory respiratory muscles  CVS:  Regular rate and rhythm without a murmur rub   Abdomen:   soft and nontender   Extremities:  No lower extremity edema  Neuro:  No gross focality  Psych:  Alert but disoriented    Invasive Devices:      Lab Results:   Results from last 7 days   Lab Units 02/07/22  0546 02/06/22  0517 02/05/22  0530 02/04/22  1005   WBC Thousand/uL 12 30* 9 09 9 67 14 00*   HEMOGLOBIN g/dL 10 4* 10 4* 12 1 14 5   HEMATOCRIT % 33 0* 32 2* 36 0 42 8   PLATELETS Thousands/uL 204 195 178 221   POTASSIUM mmol/L 4 9 4 1 4 0 3 5   CHLORIDE mmol/L 107 105 105 101   CO2 mmol/L 22 25 25 21*   BUN mg/dL 75* 44* 32* 23*   CREATININE mg/dL 2 81* 1 79* 1 51* 1 36*   CALCIUM mg/dL 8 6 8 1* 9 1 9 9   MAGNESIUM mg/dL  --   --  2 0  --        Other Studies:   XR hip/pelv 2-3 vws right if performed   Final Result by Shana Oliveira MD (02/07 8552)      Fluoroscopic guidance provided for procedure guidance  Please refer to the separate procedure notes for additional details  Workstation performed: [id number]          kidney and bladder    (Results Pending)       Portions of the record may have been created with voice recognition software  Occasional wrong word or "sound a like" substitutions may have occurred due to the inherent limitations of voice recognition software  Read the chart carefully and recognize, using context, where substitutions have occurred

## 2022-02-07 NOTE — PROGRESS NOTES
Progress Note - Orthopedics   Bunny Silverman 79 y o  female MRN: [id number]  Unit/Bed#: [facility] CAR NON INV      Subjective:    79 y  o female POD #2 right femoral IMN  No acute events  Pt with Alzheimer's dementia but seems more alert this morning  She denies pain  Denies other consitutional complaints      Labs:  0   Lab Value Date/Time    HCT 33 0 (L) 02/07/2022 0546    HCT 32 2 (L) 02/06/2022 0517    HCT 36 0 02/05/2022 0530    HGB 10 4 (L) 02/07/2022 0546    HGB 10 4 (L) 02/06/2022 0517    HGB 12 1 02/05/2022 0530    INR 1 03 02/04/2022 1004    WBC 12 30 (H) 02/07/2022 0546    WBC 9 09 02/06/2022 0517    WBC 9 67 02/05/2022 0530       Meds:    Current Facility-Administered Medications:     acetaminophen (TYLENOL) tablet 650 mg, 650 mg, Oral, 4x Daily, Tom Caballero DO, 650 mg at 02/06/22 2152    donepezil (ARICEPT) tablet 10 mg, 10 mg, Oral, HS, OnBILL Sadler-NEENA, 10 mg at 02/06/22 2152    heparin (porcine) subcutaneous injection 5,000 Units, 5,000 Units, Subcutaneous, Q8H [facility] [place], Roni Aguilera PA-C, 5,000 Units at 02/07/22 0535    hydrALAZINE (APRESOLINE) injection 5 mg, 5 mg, Intravenous, Q4H PRN, RENA JacoboC, 5 mg at 02/04/22 2330    latanoprost (XALATAN) 0 005 % ophthalmic solution 1 drop, 1 drop, Both Eyes, HS, Roni Aguilera PA-C, 1 drop at 02/06/22 2154    metoprolol succinate (TOPROL-XL) 24 hr tablet 25 mg, 25 mg, Oral, Daily, RENA JacoboC, 25 mg at 02/06/22 0809    ondansetron (ZOFRAN) injection 4 mg, 4 mg, Intravenous, Q6H PRN, Ismael Pat, CRNP    oxyCODONE (ROXICODONE) IR tablet 2 5 mg, 2 5 mg, Oral, Q4H PRN, Onnie Fallen, PA-C    oxyCODONE (ROXICODONE) IR tablet 5 mg, 5 mg, Oral, Q4H PRN, Onnie Fallen, PA-C, 5 mg at 02/06/22 1456    pantoprazole (PROTONIX) EC tablet 40 mg, 40 mg, Oral, Early Morning, Phi Bauman PA-C, 40 mg at 02/07/22 0535    potassium chloride (K-DUR,KLOR-CON) CR tablet 10 mEq, 10 mEq, Oral, BID, Onnie Fallen, PA-C, 10 mEq at 02/06/22 1736    QUEtiapine (SEROquel) tablet 25 mg, 25 mg, Oral, BID, Colten Bahena PA-C, 25 mg at 02/06/22 1736    sertraline (ZOLOFT) tablet 100 mg, 100 mg, Oral, Daily, Colten Bahena PA-C, 100 mg at 02/06/22 0991    sodium chloride 0 9 % infusion, 50 mL/hr, Intravenous, Continuous, INA Kaplan, Last Rate: 50 mL/hr at 02/07/22 0427, 50 mL/hr at 02/07/22 0427    timolol (TIMOPTIC) 0 5 % ophthalmic solution 1 drop, 1 drop, Both Eyes, BID, Colten Bahena PA-C, 1 drop at 02/06/22 1736    Blood Culture:   No results found for: BLOODCX    Wound Culture:   No results found for: WOUNDCULT    Ins and Outs:  I/O last 24 hours: In: -   Out: 150 [Urine:150]          Physical:  Vitals:    02/07/22 0745   BP: 123/55   Pulse: 63   Resp: 18   Temp: 97 8 °F (36 6 °C)   SpO2: 97%     Musculoskeletal: right Lower Extremity  Dressing c/d/i  Calf compressible, nontender  SILT L2-S1  +FHL/EHL, +ankle dorsi/plantar flexion  Toes warm and well perfused    _*_*_*_*_*_*_*_*_*_*_*_*_*_*_*_*_*_*_*_*_*_*_*_*_*_*_*_*_*_*_*_*_*_*_*_*_*_*_*_*_*    Assessment:    79 y  o female POD #2 right femoral IMN      Plan:  WBAT RLE  Up with assistance  PT/OT  Pain control per primary  DVT ppx - SCDs, Recommend 4 weeks of postop Lovenox  Dispo: Ortho will follow    Sanjay Senior PA-C

## 2022-02-07 NOTE — PROGRESS NOTES
[address]  65   Progress Note - Onelia Guevara 1940, 79 y o  female MRN: [id number]  Unit/Bed#: [address] [place] [address] Encounter: [id number]  Primary Care Provider: Ralf Solis MD   Date and time admitted to hospital: 2/4/2022  8:33 PM    * Femoral neck fracture [facility])  Assessment & Plan  · Right intertrochanteric femur fracture status post right femoral intertrochanteric nailing POD #2  · Will schedule tylenol    Oxycodone for severe/breakthrough pain  · Therapy recommending rehab  · WBAT RLE   · Will need 4 weeks post-op DVT ppx     Acute kidney injury superimposed on CKD ([facility] [address] )  Assessment & Plan  · Superimposed on stage III CKD  · Baseline creatinine 1 3-1 4  · Creatinine had been slowly trending up, creatinine today noted to be 2 8  · Possibly secondary to hemodynamic changes in the setting of hypotension and Entresto, however, need to rule out obstruction- check kidney and bladder ultrasound  · Patient has had decreased urine output despite being on IV fluids  · Hold Entresto, Continue IV fluid hydration  · Urinary retention protocol, check bladder scan  · Nephrology consulted appreciate input    Chronic combined systolic and diastolic CHF (congestive heart failure) ([facility])  Assessment & Plan  Wt Readings from Last 3 Encounters:   02/07/22 68 9 kg (152 lb)   02/04/22 61 2 kg (135 lb)   11/05/20 55 3 kg (122 lb)     · Compensated if not dried  · EF on prior echo at  11/2021 shows EF 60-65%  · Continue Toprol XL 25mg daily   · Lasix and Entresto on hold per cardiology due to DUARTE   · Monitor I/O, daily weights  · Restart diuretics when renal function improved  · Confirmed home meds with FREYA Acosta 3 today: lasix 20mg daily, KCl 10 mEq daily, Entresto BID, Toprol XL 25mg daily    Anemia  Assessment & Plan  · Hemoglobin today stable at 10 4  · Acute blood loss anemia status post intertrochanteric nailing for right femoral fracture   · Continue monitor hemoglobin    Essential hypertension  Assessment & Plan  · Continue metoprolol  Amlodipine discontinued due to lower blood pressures    Alzheimer's dementia without behavioral disturbance (HCC)  Assessment & Plan  · Mentation stable continue donepezil        VTE Pharmacologic Prophylaxis:   Moderate Risk (Score 3-4) - Pharmacological DVT Prophylaxis Ordered: heparin  Patient Centered Rounds: I performed bedside rounds with nursing staff today  Discussions with Specialists or Other Care Team Provider: [facility]    Education and Discussions with Family / Patient: Updated  (daughter) via phone  Time Spent for Care: 30 minutes  More than 50% of total time spent on counseling and coordination of care as described above  Current Length of Stay: 3 day(s)  Current Patient Status: Inpatient   Certification Statement: The patient will continue to require additional inpatient hospital stay due to DUARTE  Discharge Plan: Anticipate discharge in 24-48 hrs to rehab facility  Code Status: Level 3 - DNAR and DNI    Subjective:   Patient was resting in bed, reports that the pain in her right leg is well controlled  Denies any nausea  Patient was complaining of pain and nausea yesterday, this has improved  Patient has had decreased p o  Intake  Patient is currently alert and oriented to self and place, not time  Objective:     Vitals:   Temp (24hrs), Av °F (36 7 °C), Min:97 7 °F (36 5 °C), Max:98 4 °F (36 9 °C)    Temp:  [97 7 °F (36 5 °C)-98 4 °F (36 9 °C)] 97 8 °F (36 6 °C)  HR:  [] 63  Resp:  [18-20] 18  BP: ()/(45-55) 123/55  SpO2:  [95 %-97 %] 97 %  Body mass index is 23 11 kg/m²  Input and Output Summary (last 24 hours): Intake/Output Summary (Last 24 hours) at 2022 1154  Last data filed at 2022 1230  Gross per 24 hour   Intake --   Output 150 ml   Net -150 ml       Physical Exam:   Physical Exam  Constitutional:       General: She is not in acute distress    HENT:      Mouth/Throat:      Mouth: Mucous membranes are dry    Eyes:      General: No scleral icterus  Cardiovascular:      Rate and Rhythm: Normal rate and regular rhythm  Heart sounds: Normal heart sounds  Pulmonary:      Breath sounds: Normal breath sounds  Abdominal:      General: Abdomen is flat  Bowel sounds are normal       Palpations: Abdomen is soft  Musculoskeletal:      Right lower leg: No edema  Left lower leg: No edema  Skin:     Comments: Right hip dressing c/d/i    Neurological:      Mental Status: She is alert  Mental status is at baseline  Comments: Oriented to self and place   Psychiatric:         Mood and Affect: Mood normal           Additional Data:     Labs:  Results from last 7 days   Lab Units 02/07/22  0546 02/05/22  0530 02/04/22  1005   WBC Thousand/uL 12 30*   < > 14 00*   HEMOGLOBIN g/dL 10 4*   < > 14 5   HEMATOCRIT % 33 0*   < > 42 8   PLATELETS Thousands/uL 204   < > 221   NEUTROS PCT %  --   --  89*   LYMPHS PCT %  --   --  2*   MONOS PCT %  --   --  8   EOS PCT %  --   --  0    < > = values in this interval not displayed  Results from last 7 days   Lab Units 02/07/22  0546   SODIUM mmol/L 140   POTASSIUM mmol/L 4 9   CHLORIDE mmol/L 107   CO2 mmol/L 22   BUN mg/dL 75*   CREATININE mg/dL 2 81*   ANION GAP mmol/L 11   CALCIUM mg/dL 8 6   ALBUMIN g/dL 2 6*   TOTAL BILIRUBIN mg/dL 1 08*   ALK PHOS U/L 70   ALT U/L 17   AST U/L 155*   GLUCOSE RANDOM mg/dL 111     Results from last 7 days   Lab Units 02/04/22  1004   INR  1 03     Results from last 7 days   Lab Units 02/06/22  1105 02/06/22  0650 02/05/22  2051 02/05/22  1539 02/05/22  0656   POC GLUCOSE mg/dl 133 120 116 121 105               Lines/Drains:  Invasive Devices  Report    Peripheral Intravenous Line            Peripheral IV 02/06/22 Distal;Dorsal (posterior); Right Forearm <1 day                      Imaging: No pertinent imaging reviewed      Recent Cultures (last 7 days):         Last 24 Hours Medication List:   Current Facility-Administered Medications   Medication Dose Route Frequency Provider Last Rate    acetaminophen  650 mg Oral 4x Daily Tom Caballero DO      donepezil  10 mg Oral HS Donalynn Saver, PA-C      heparin (porcine)  5,000 Units Subcutaneous [place], [place]      hydrALAZINE  5 mg Intravenous Q4H PRN Donalynn Saver, PA-C      latanoprost  1 drop Both Eyes HS Donalynn Saver, PA-C      metoprolol succinate  25 mg Oral Daily Nayanalynn Saver, PA-C      multi-electrolyte  75 mL/hr Intravenous Continuous Karissa Chapman MD      ondansetron  4 mg Intravenous Q6H PRN INA Amin      oxyCODONE  2 5 mg Oral Q4H PRN Donalynn Saver, PA-C      oxyCODONE  5 mg Oral Q4H PRN Donalynn Saver, PA-C      pantoprazole  40 mg Oral Early Morning Donalynn Saver, PA-C      potassium chloride  10 mEq Oral BID Donalynn Saver, PA-C      QUEtiapine  25 mg Oral BID Donalynn Saver, PA-C      sertraline  100 mg Oral Daily Donalynn Saver, PA-C      timolol  1 drop Both Eyes BID Thony Saver, PA-C          Today, Patient Was Seen By: Jake Ortega PA-C    **Please Note: This note may have been constructed using a voice recognition system  **

## 2022-02-07 NOTE — ASSESSMENT & PLAN NOTE
· Hemoglobin today stable at 10 4  · Acute blood loss anemia status post intertrochanteric nailing for right femoral fracture   · Continue monitor hemoglobin

## 2022-02-07 NOTE — PROGRESS NOTES
Progress Note - Cardiology   [facility]     Julia Cameron 79 y o  female MRN: [id number]  : 1940  Unit/Bed#: [address] [place] [address] Encounter: [id number]    Assessment and Plan:   1  Acute intertrochanteric right femoral fracture:  POD#2 intertrochanteric nailing of right femoral fracture    -  care per Ortho and PT/OT    4  Acute kidney injury on Chronic kidney disease stage 3:  Baseline creatinine appears to be around 1 3, this morning creatinine elevated to 2 81    -  patient noted to be hypotensive with poor oral intake yesterday    -  normal saline 50 mL/hour started on 2022    -  holding Entresto at time will monitor blood pressure closely     3  Hypertension:  Postop hypotension  Will hold Entresto and continue monitor     4  Chronic combined systolic and diastolic heart failure:  Repeat limited echo pending to evaluate EF     -  EF on echo performed at [facility] in 2021 was 60-65%    -  Holding Entresto due to DUARTE    -  will need close monitoring    -  continue Toprol XL 25 mg daily    -  monitor I&O, daily weights and labs     5  Alzheimer's dementia:  Disoriented this morning, most likely due to anesthesia effect    -  will need close monitoring every orientation  Subjective / Objective:   Patient seen and examined  She is sleepy this morning  Noted be hypotensive yesterday  BUN creatinine bumped despite holding Entresto and giving gentle IV hydration  Will continue to hold [facility] and consult Nephrology  Discussed with primary team    Vitals: Blood pressure 123/55, pulse 63, temperature 97 8 °F (36 6 °C), temperature source Axillary, resp  rate 18, height 5' 8" (1 727 m), weight 68 9 kg (152 lb), SpO2 97 %  Vitals:    22 2100 22 0743   Weight: 69 1 kg (152 lb 4 8 oz) 68 9 kg (152 lb)     Body mass index is 23 11 kg/m²    BP Readings from Last 3 Encounters:   22 123/55   22 161/74   20 116/68     Orthostatic Blood Pressures Most Recent Value   Blood Pressure 123/55 filed at 02/07/2022 0745   Patient Position - Orthostatic VS Lying filed at 02/07/2022 0745        I/O       02/05 0701 02/06 0700 02/06 0701 02/07 0700 02/07 0701 02/08 0700    P  O  0      I V  (mL/kg) 650 (9 4)      Total Intake(mL/kg) 650 (9 4)      Urine (mL/kg/hr) 490 (0 3) 150 (0 1)     Emesis/NG output 0      Stool 0      Blood 50      Total Output 540 150     Net +110 -150            Unmeasured Stool Occurrence 0 x          Invasive Devices  Report    Peripheral Intravenous Line            Peripheral IV 02/06/22 Distal;Dorsal (posterior); Right Forearm <1 day                  Intake/Output Summary (Last 24 hours) at 2/7/2022 0754  Last data filed at 2/6/2022 1230  Gross per 24 hour   Intake --   Output 150 ml   Net -150 ml         Physical Exam:   Physical Exam  Vitals and nursing note reviewed  Constitutional:       General: She is not in acute distress  Appearance: Normal appearance  She is well-developed and normal weight  HENT:      Head: Normocephalic  Right Ear: External ear normal       Left Ear: External ear normal       Nose: Nose normal    Eyes:      General: No scleral icterus  Right eye: No discharge  Left eye: No discharge  Neck:      Thyroid: No thyromegaly  Cardiovascular:      Rate and Rhythm: Normal rate and regular rhythm  Pulses: Normal pulses  Pulmonary:      Effort: Pulmonary effort is normal  No respiratory distress  Breath sounds: Normal breath sounds  No wheezing, rhonchi or rales  Abdominal:      General: Bowel sounds are normal  There is no distension  Palpations: Abdomen is soft  Musculoskeletal:      Cervical back: Normal range of motion and neck supple  Right lower leg: No edema  Left lower leg: No edema  Skin:     General: Skin is warm and dry  Capillary Refill: Capillary refill takes less than 2 seconds  Neurological:      General: No focal deficit present  Mental Status: She is alert  Mental status is at baseline     Psychiatric:         Mood and Affect: Mood normal                    Medications/ Allergies:     Current Facility-Administered Medications   Medication Dose Route Frequency Provider Last Rate    acetaminophen  650 mg Oral 4x Daily Tom Caballero DO      donepezil  10 mg Oral HS Donnella Jaylon, PA-C      heparin (porcine)  5,000 Units Subcutaneous [place], [place]      hydrALAZINE  5 mg Intravenous Q4H PRN Donnella Fujgiovanni, PA-C      latanoprost  1 drop Both Eyes HS Donnella Jaylon, PA-C      metoprolol succinate  25 mg Oral Daily Donnella Fujita, PA-C      ondansetron  4 mg Intravenous Q6H PRN Sunday Riccardo, CRNP      oxyCODONE  2 5 mg Oral Q4H PRN Donnella Fujita, PA-C      oxyCODONE  5 mg Oral Q4H PRN Donnella Fujita, PA-C      pantoprazole  40 mg Oral Early Morning Donnella Fujita, PA-C      potassium chloride  10 mEq Oral BID Donnella Fujita, PA-C      QUEtiapine  25 mg Oral BID Donnella Fujita, PA-C      sertraline  100 mg Oral Daily Donnella Fujita, PA-C      sodium chloride  50 mL/hr Intravenous Continuous Sunday Riccardo, CRNP 50 mL/hr (02/07/22 0427)    timolol  1 drop Both Eyes BID Donnella Fujita, PA-C       hydrALAZINE, 5 mg, Q4H PRN  ondansetron, 4 mg, Q6H PRN  oxyCODONE, 2 5 mg, Q4H PRN  oxyCODONE, 5 mg, Q4H PRN      No Known Allergies    VTE Pharmacologic Prophylaxis:   Sequential compression device (Venodyne)     Labs:   Troponins:  Results from last 7 days   Lab Units 02/04/22  1005   CK TOTAL U/L 129 0   HS TNI RAND ng/L 17     CBC with diff:  Results from last 7 days   Lab Units 02/07/22  0546 02/06/22  0517 02/05/22  0530 02/04/22  1005   WBC Thousand/uL 12 30* 9 09 9 67 14 00*   HEMOGLOBIN g/dL 10 4* 10 4* 12 1 14 5   HEMATOCRIT % 33 0* 32 2* 36 0 42 8   MCV fL 97 96 92 89   PLATELETS Thousands/uL 204 195 178 221   MCH pg 30 6 30 9 30 9 30 3   MCHC g/dL 31 5 32 3 33 6 33 9   RDW % 14 4 14 1 13 8 13 2   MPV fL 10 4 10 2 9 9 9 6   NRBC AUTO /100 WBCs  --   --   --  0     CMP:  Results from last 7 days   Lab Units 02/07/22  0546 02/06/22  0517 02/05/22  0530 02/04/22  1005   SODIUM mmol/L 140 136 138 137   POTASSIUM mmol/L 4 9 4 1 4 0 3 5   CHLORIDE mmol/L 107 105 105 101   CO2 mmol/L 22 25 25 21*   ANION GAP mmol/L 11 6 8 15*   BUN mg/dL 75* 44* 32* 23*   CREATININE mg/dL 2 81* 1 79* 1 51* 1 36*   CALCIUM mg/dL 8 6 8 1* 9 1 9 9   AST U/L 155* 129*  --  97*   ALT U/L 17 21  --  18   ALK PHOS U/L 70 62  --  76 8   TOTAL PROTEIN g/dL 6 3* 5 9*  --  7 8   ALBUMIN g/dL 2 6* 2 6*  --  4 3   TOTAL BILIRUBIN mg/dL 1 08* 1 09*  --  1 07   EGFR ml/min/1 73sq m 15 26 31 36     Magnesium:  Results from last 7 days   Lab Units 02/05/22  0530   MAGNESIUM mg/dL 2 0     Coags:  Results from last 7 days   Lab Units 02/04/22  1004   INR  1 03       Imaging & Testing   I have personally reviewed pertinent reports  XR chest portable    Result Date: 2/4/2022  Narrative: CHEST INDICATION:   fall  COMPARISON:  None EXAM PERFORMED/VIEWS:  XR CHEST PORTABLE FINDINGS: Cardiomediastinal silhouette appears unremarkable  The lungs are clear  No pneumothorax or pleural effusion  Degenerative change of the shoulders  Impression: No acute cardiopulmonary disease  Workstation performed: [id number]     XR hip/pelv 2-3 vws right if performed    Result Date: 2/7/2022  Narrative: C-ARM - right hip INDICATION: orif  Procedure guidance  COMPARISON:  Right hip radiographs 2/4/2022 TECHNIQUE: FLUOROSCOPY TIME:   121 5 seconds 2 FLUOROSCOPIC IMAGES FINDINGS: Fluoroscopic guidance provided for procedure guidance  ORIF of right femoral intertrochanteric fracture  Osseous and soft tissue detail limited by technique  Impression: Fluoroscopic guidance provided for procedure guidance  Please refer to the separate procedure notes for additional details    Workstation performed: [id number]     XR hip/pelv 2-3 vws right    Result Date: 2/4/2022  Narrative: RIGHT HIP AND RIGHT [facility] femur INDICATION:   fall  COMPARISON:  Radiographs of the abdomen April 30, 2018  VIEWS:  XR HIP/PELV 2-3 VWS RIGHT W PELVIS IF PERFORMED, XR FEMUR 2 VW RIGHT FINDINGS: Acute intertrochanteric fracture of the right femoral neck with involvement of the greater and lesser trochanters and foreshortening of the femoral shaft  Irregularity of the superior and inferior left pubic rami  Evaluation of the sacrum is limited due to overlying bowel gas and stool  Mild bilateral hip osteoarthrosis  No lytic or blastic osseous lesion  Soft tissues are unremarkable  Impression: 1  Acute intertrochanteric right femoral neck fracture  2   Irregularities of the left superior and inferior pubic rami likely reflects sequelae of prior injury  Correlation with tenderness at this site is advised to assess for acute injury  Workstation performed: [id number]     XR femur 2 views RIGHT    Result Date: 2/4/2022  Narrative: RIGHT HIP AND RIGHT [facility] femur INDICATION:   fall  COMPARISON:  Radiographs of the abdomen April 30, 2018  VIEWS:  XR HIP/PELV 2-3 VWS RIGHT W PELVIS IF PERFORMED, XR FEMUR 2 VW RIGHT FINDINGS: Acute intertrochanteric fracture of the right femoral neck with involvement of the greater and lesser trochanters and foreshortening of the femoral shaft  Irregularity of the superior and inferior left pubic rami  Evaluation of the sacrum is limited due to overlying bowel gas and stool  Mild bilateral hip osteoarthrosis  No lytic or blastic osseous lesion  Soft tissues are unremarkable  Impression: 1  Acute intertrochanteric right femoral neck fracture  2   Irregularities of the left superior and inferior pubic rami likely reflects sequelae of prior injury  Correlation with tenderness at this site is advised to assess for acute injury   Workstation performed: [id number]     CT head wo contrast    Result Date: 2/4/2022  Narrative: CT BRAIN - WITHOUT CONTRAST INDICATION:   Fall yesterday    COMPARISON:  None  TECHNIQUE:  CT examination of the brain was performed  In addition to axial images, sagittal and coronal 2D reformatted images were created and submitted for interpretation  Radiation dose length product (DLP) for this visit:  800 mGy-cm   This examination, like all CT scans performed in the [facility], was performed utilizing techniques to minimize radiation dose exposure, including the use of iterative reconstruction and automated exposure control  IMAGE QUALITY:  Diagnostic  FINDINGS: PARENCHYMA: Decreased attenuation is noted in periventricular and subcortical white matter demonstrating an appearance that is statistically most likely to represent mild microangiopathic change  Chronic lacunar infarction(s) are noted in basal ganglia/thalami  No CT signs of acute infarction  No intracranial mass, mass effect or midline shift  No acute parenchymal hemorrhage  VENTRICLES AND EXTRA-AXIAL SPACES:  Normal for the patient's age  VISUALIZED ORBITS AND PARANASAL SINUSES:  Unremarkable  CALVARIUM AND EXTRACRANIAL SOFT TISSUES:  Normal      Impression: No acute intracranial abnormality  Workstation performed: [id number]          [place], [place]  Cardiology      "This note has been constructed using a voice recognition system  Therefore there may be syntax, spelling, and/or grammatical errors   Please call if you have any questions  "

## 2022-02-07 NOTE — PLAN OF CARE
Problem: Nutrition/Hydration-ADULT  Goal: Nutrient/Hydration intake appropriate for improving, restoring or maintaining nutritional needs  Description: Monitor and assess patient's nutrition/hydration status for malnutrition  Collaborate with interdisciplinary team and initiate plan and interventions as ordered  Monitor patient's weight and dietary intake as ordered or per policy  Utilize nutrition screening tool and intervene as necessary  Determine patient's food preferences and provide high-protein, high-caloric foods as appropriate       INTERVENTIONS:  - Monitor oral intake, urinary output, labs, and treatment plans  - Assess nutrition and hydration status and recommend course of action  - Evaluate amount of meals eaten  - Assist patient with eating if necessary   - Allow adequate time for meals  - Recommend/ encourage appropriate diets, oral nutritional supplements, and vitamin/mineral supplements  - Order, calculate, and assess calorie counts as needed  - Recommend, monitor, and adjust tube feedings and TPN/PPN based on assessed needs  - Assess need for intravenous fluids  - Provide specific nutrition/hydration education as appropriate  - Include patient/family/caregiver in decisions related to nutrition  Outcome: Progressing     Problem: MUSCULOSKELETAL - ADULT  Goal: Maintain or return mobility to safest level of function  Description: INTERVENTIONS:  - Assess patient's ability to carry out ADLs; assess patient's baseline for ADL function and identify physical deficits which impact ability to perform ADLs (bathing, care of mouth/teeth, toileting, grooming, dressing, etc )  - Assess/evaluate cause of self-care deficits   - Assess range of motion  - Assess patient's mobility  - Assess patient's need for assistive devices and provide as appropriate  - Encourage maximum independence but intervene and supervise when necessary  - Involve family in performance of ADLs  - Assess for home care needs following discharge   - Consider OT consult to assist with ADL evaluation and planning for discharge  - Provide patient education as appropriate  Outcome: Progressing  Goal: Maintain proper alignment of affected body part  Description: INTERVENTIONS:  - Support, maintain and protect limb and body alignment  - Provide patient/ family with appropriate education  Outcome: Progressing     Problem: MOBILITY - ADULT  Goal: Maintain or return to baseline ADL function  Description: INTERVENTIONS:  -  Assess patient's ability to carry out ADLs; assess patient's baseline for ADL function and identify physical deficits which impact ability to perform ADLs (bathing, care of mouth/teeth, toileting, grooming, dressing, etc )  - Assess/evaluate cause of self-care deficits   - Assess range of motion  - Assess patient's mobility; develop plan if impaired  - Assess patient's need for assistive devices and provide as appropriate  - Encourage maximum independence but intervene and supervise when necessary  - Involve family in performance of ADLs  - Assess for home care needs following discharge   - Consider OT consult to assist with ADL evaluation and planning for discharge  - Provide patient education as appropriate  Outcome: Progressing  Goal: Maintains/Returns to pre admission functional level  Description: INTERVENTIONS:  - Perform BMAT or MOVE assessment daily    - Set and communicate daily mobility goal to care team and patient/family/caregiver  - Collaborate with rehabilitation services on mobility goals if consulted  - Perform Range of Motion 3 times a day  - Reposition patient every 2 hours    - Dangle patient 3times a day  - Stand patient 2 times a day  - Ambulate patient 2 times a day  - Out of bed to chair  2 times a day   -  Problem: Potential for Falls  Goal: Patient will remain free of falls  Description: INTERVENTIONS:  - Educate patient/family on patient safety including physical limitations  - Instruct patient to call for assistance with activity   - Consult OT/PT to assist with strengthening/mobility   - Keep Call bell within reach  - Keep bed low and locked with side rails adjusted as appropriate  - Keep care items and personal belongings within reach  - Initiate and maintain comfort rounds  - Make Fall Risk Sign visible to staff  - Offer Toileting every 2Hours, in advance of need  - Initiate/Maintain bed alarm  - Obtain necessary fall risk management equipment:   - Apply yellow socks and bracelet for high fall risk patients  - Consider moving patient to room near nurses station  Outcome: Progressing     - Out of bed for toileting  - Record patient progress and toleration of activity level   Outcome: Progressing

## 2022-02-07 NOTE — SPEECH THERAPY NOTE
Speech-Language Pathology Bedside Swallow Evaluation      Patient Name: Lexii Mccracken    TFEYR'B Date: 2/7/2022     Problem List  Principal Problem:    Femoral neck fracture ([facility] [address] )  Active Problems:    Alzheimer's dementia without behavioral disturbance (HCC)    Chronic combined systolic and diastolic CHF (congestive heart failure) ([facility] [address] )    Stage 3a chronic kidney disease ([facility] [address] )    Essential hypertension      Past Medical History  Past Medical History:   Diagnosis Date    Arthritis     Back pain     Dementia ([facility] [address] )     Glaucoma     Hypertension     Psychiatric disorder     depression    Tremors of nervous system        Past Surgical History  Past Surgical History:   Procedure Laterality Date    HYSTERECTOMY         Summary   Pt presented as lethargic with s/s suggestive of at least moderate oral/pharyngeal dysphagia  Recommended Diet: puree/level 1 diet and nectar thick liquids   Recommended Form of Meds: crushed with puree   Aspiration precautions and swallowing strategies: upright posture, only feed when fully alert, slow rate of feeding and small bites/sips  Other Recommendations: Continue oral care        Current Medical Status  Lexii Mccracken is a 78 yo F c PMH of dementia who [facility] s/p fall at her group home with R-sided hip pain  DX: R hip fx (repair completed 2/5)  Coughing on thin liquids reported and SLP Swallowing Evaluation requested at this time       Current Precautions:  Fall     Allergies:  No known food allergies    Past medical history:  Please see H&P for details    Social/Education/Vocational Hx:  Pt lives at [facility]   Current Symptoms/Concerns: coughing with thin liquid  Current Diet: mechanically altered/level 2 diet and nectar thick liquids   Baseline Diet:  "regular diet" and thin liquids (I spoke with staff at [facility] re: same)      Baseline Assessment   Behavior/Cognition: lethargic , not feeding herself  Speech/Language Status: able to follow commands inconsistently and limited verbal output  Patient Positioning: upright in bed  Pain Status/Interventions/Response to Interventions:  No report of or nonverbal indications of pain  Swallow Mechanism Exam  Facial: symmetrical  Labial: WFL  Lingual: decreased ROM and decreased coordination (pt with limited effort)  Mandible: adequate ROM  Dentition: limited dentition on lower gum ridge (no upper dentition, no dentures available today and staff at [facility] reported pt does not heave/wear dentures  Vocal quality:clear/adequate but of at least mod reduced volume  Respiratory Status: on RA       Consistencies Assessed and Performance   Consistencies Administered: thin liquids, nectar thick, puree and mechanical soft solids  Materials administered included farina, yogurt (with and without small amts of nunu cracker, banana, nectar and thin juice    Oral Stage: At least moderate impaired  Mastication was weak with the banana and min amt of minced nunu cracker in yogurt  Bolus formation and transfer were functional with no significant oral residue noted  Suspect reduced oral control with thin liquid    Pharyngeal Stage: suspect moderate impairment  Swallow Mechanics:  Swallowing initiation appeared prompt to mildly delayed  Laryngeal rise was palpated and judged to be fair  Coughing noted intermittently with sips of thin (eg 1 of 3 sips) and with very small pieces of nunu cracker in yogurt  Cough is WEAK  Successive swallows also noted with all foods  Esophageal Concerns early satiety    Strategies and Efficacy: pt was stimulated to maximize engagement and she was fully upright and fed slowly    Summary and Recommendations (see above)    Results Reviewed with: patient and RN     Treatment Recommended: yes     Frequency of treatment: min of 3x weekly    Patient Stated Goal: Unable to state at this time      Dysphagia LTG  -Patient will demonstrate safe and effective oral intake (without overt s/s significant oral/pharyngeal dysphagia including s/s penetration or aspiration) for the highest appropriate diet level  Short Term Goals:  -Pt will tolerate Dysphagia 1/pureed diet and nectar thick liquid with no significant s/s oral or pharyngeal dysphagia across 1-3 diagnostic session/s    -Patient will tolerate trials of upgraded food and liquid texture with no significant s/s of oral or pharyngeal dysphagia including aspiration across 1-3 diagnostic sessions     -If indicated, Patient will comply with a Video/Modified Barium Swallow study for more complete assessment of swallowing anatomy/physiology/aspiration risk and to assess efficacy of treatment techniques so as to best guide treatment plan    -Patients caregivers will demonstrate understanding, recall and use of recommended diet and (prompting for use of) compensatory strategies with no cues needed    Thank you for this referral   Please do not hesitate to contact me with any questions or concerns      Juan Adler [address] [place] [address] [phone]

## 2022-02-07 NOTE — ASSESSMENT & PLAN NOTE
· Superimposed on stage III CKD  · Baseline creatinine 1 3-1 4  · Creatinine had been slowly trending up, creatinine today noted to be 2 8  · Possibly secondary to hemodynamic changes in the setting of hypotension and Entresto, however, need to rule out obstruction- check kidney and bladder ultrasound  · Patient has had decreased urine output despite being on IV fluids  · Hold Entresto, Continue IV fluid hydration  · Urinary retention protocol, check bladder scan  · Nephrology consulted appreciate input

## 2022-02-08 ENCOUNTER — APPOINTMENT (INPATIENT)
Dept: RADIOLOGY | Facility: HOSPITAL | Age: 82
DRG: 481 | End: 2022-02-08
Payer: COMMERCIAL

## 2022-02-08 PROBLEM — D72.829 LEUKOCYTOSIS: Status: ACTIVE | Noted: 2022-02-08

## 2022-02-08 PROBLEM — D62 POSTOPERATIVE ANEMIA DUE TO ACUTE BLOOD LOSS: Status: ACTIVE | Noted: 2022-02-07

## 2022-02-08 LAB
ALBUMIN SERPL BCP-MCNC: 2.3 G/DL (ref 3.5–5)
ALP SERPL-CCNC: 63 U/L (ref 46–116)
ALT SERPL W P-5'-P-CCNC: 24 U/L (ref 12–78)
ANION GAP SERPL CALCULATED.3IONS-SCNC: 10 MMOL/L (ref 4–13)
AORTIC ROOT: 3.5 CM
APICAL FOUR CHAMBER EJECTION FRACTION: 64 %
AST SERPL W P-5'-P-CCNC: 206 U/L (ref 5–45)
BILIRUB SERPL-MCNC: 0.86 MG/DL (ref 0.2–1)
BUN SERPL-MCNC: 84 MG/DL (ref 5–25)
CALCIUM ALBUM COR SERPL-MCNC: 9.8 MG/DL (ref 8.3–10.1)
CALCIUM SERPL-MCNC: 8.4 MG/DL (ref 8.3–10.1)
CHLORIDE SERPL-SCNC: 111 MMOL/L (ref 100–108)
CO2 SERPL-SCNC: 23 MMOL/L (ref 21–32)
CREAT SERPL-MCNC: 2.35 MG/DL (ref 0.6–1.3)
E WAVE DECELERATION TIME: 362 MS
ERYTHROCYTE [DISTWIDTH] IN BLOOD BY AUTOMATED COUNT: 14.5 % (ref 11.6–15.1)
FRACTIONAL SHORTENING: 36 % (ref 28–44)
GFR SERPL CREATININE-BSD FRML MDRD: 18 ML/MIN/1.73SQ M
GLUCOSE SERPL-MCNC: 92 MG/DL (ref 65–140)
HCT VFR BLD AUTO: 28.8 % (ref 34.8–46.1)
HGB BLD-MCNC: 9 G/DL (ref 11.5–15.4)
INTERVENTRICULAR SEPTUM IN DIASTOLE (PARASTERNAL SHORT AXIS VIEW): 1 CM (ref 0.51–0.96)
LAAS-AP2: 31 CM2
LAAS-AP4: 24.5 CM2
LEFT ATRIUM AREA SYSTOLE SINGLE PLANE A4C: 23.2 CM2
LEFT ATRIUM SIZE: 3.3 CM
LEFT INTERNAL DIMENSION IN SYSTOLE: 2.8 CM (ref 2.1–4)
LEFT VENTRICULAR INTERNAL DIMENSION IN DIASTOLE: 4.4 CM (ref 4.13–6.15)
LEFT VENTRICULAR POSTERIOR WALL IN END DIASTOLE: 1 CM (ref 0.5–0.95)
LEFT VENTRICULAR STROKE VOLUME: 59 ML
MCH RBC QN AUTO: 30.4 PG (ref 26.8–34.3)
MCHC RBC AUTO-ENTMCNC: 31.3 G/DL (ref 31.4–37.4)
MCV RBC AUTO: 97 FL (ref 82–98)
MV E'TISSUE VEL-SEP: 5 CM/S
MV PEAK A VEL: 0.88 M/S
MV PEAK E VEL: 66 CM/S
PLATELET # BLD AUTO: 211 THOUSANDS/UL (ref 149–390)
PMV BLD AUTO: 10.4 FL (ref 8.9–12.7)
POTASSIUM SERPL-SCNC: 4.3 MMOL/L (ref 3.5–5.3)
PROT SERPL-MCNC: 5.7 G/DL (ref 6.4–8.2)
RBC # BLD AUTO: 2.96 MILLION/UL (ref 3.81–5.12)
RIGHT ATRIUM AREA SYSTOLE A4C: 17.7 CM2
RIGHT VENTRICLE ID DIMENSION: 3.7 CM
SL CV LEFT ATRIUM LENGTH A2C: 6.7 CM
SL CV LV EF: 65
SL CV PED ECHO LEFT VENTRICLE DIASTOLIC VOLUME (MOD BIPLANE) 2D: 87 ML
SL CV PED ECHO LEFT VENTRICLE SYSTOLIC VOLUME (MOD BIPLANE) 2D: 29 ML
SODIUM SERPL-SCNC: 144 MMOL/L (ref 136–145)
TR MAX PG: 56 MMHG
TRICUSPID VALVE PEAK REGURGITATION VELOCITY: 3.6 M/S
WBC # BLD AUTO: 9.32 THOUSAND/UL (ref 4.31–10.16)
Z-SCORE OF INTERVENTRICULAR SEPTUM IN END DIASTOLE: 2.3
Z-SCORE OF LEFT VENTRICULAR DIMENSION IN END SYSTOLE: -1.36
Z-SCORE OF LEFT VENTRICULAR POSTERIOR WALL IN END DIASTOLE: 2.41

## 2022-02-08 PROCEDURE — 99232 SBSQ HOSP IP/OBS MODERATE 35: CPT | Performed by: STUDENT IN AN ORGANIZED HEALTH CARE EDUCATION/TRAINING PROGRAM

## 2022-02-08 PROCEDURE — 92526 ORAL FUNCTION THERAPY: CPT

## 2022-02-08 PROCEDURE — 97535 SELF CARE MNGMENT TRAINING: CPT

## 2022-02-08 PROCEDURE — 93321 DOPPLER ECHO F-UP/LMTD STD: CPT | Performed by: INTERNAL MEDICINE

## 2022-02-08 PROCEDURE — 80053 COMPREHEN METABOLIC PANEL: CPT | Performed by: PHYSICIAN ASSISTANT

## 2022-02-08 PROCEDURE — 85027 COMPLETE CBC AUTOMATED: CPT | Performed by: PHYSICIAN ASSISTANT

## 2022-02-08 PROCEDURE — 99232 SBSQ HOSP IP/OBS MODERATE 35: CPT | Performed by: INTERNAL MEDICINE

## 2022-02-08 PROCEDURE — 76770 US EXAM ABDO BACK WALL COMP: CPT

## 2022-02-08 PROCEDURE — 93308 TTE F-UP OR LMTD: CPT | Performed by: INTERNAL MEDICINE

## 2022-02-08 PROCEDURE — 97110 THERAPEUTIC EXERCISES: CPT

## 2022-02-08 PROCEDURE — 93325 DOPPLER ECHO COLOR FLOW MAPG: CPT | Performed by: INTERNAL MEDICINE

## 2022-02-08 PROCEDURE — 97530 THERAPEUTIC ACTIVITIES: CPT

## 2022-02-08 RX ORDER — SODIUM CHLORIDE, SODIUM GLUCONATE, SODIUM ACETATE, POTASSIUM CHLORIDE, MAGNESIUM CHLORIDE, SODIUM PHOSPHATE, DIBASIC, AND POTASSIUM PHOSPHATE .53; .5; .37; .037; .03; .012; .00082 G/100ML; G/100ML; G/100ML; G/100ML; G/100ML; G/100ML; G/100ML
75 INJECTION, SOLUTION INTRAVENOUS CONTINUOUS
Status: DISPENSED | OUTPATIENT
Start: 2022-02-08 | End: 2022-02-08

## 2022-02-08 RX ADMIN — OXYCODONE HYDROCHLORIDE 5 MG: 5 TABLET ORAL at 08:34

## 2022-02-08 RX ADMIN — LATANOPROST 1 DROP: 50 SOLUTION/ DROPS OPHTHALMIC at 22:11

## 2022-02-08 RX ADMIN — HEPARIN SODIUM 5000 UNITS: 5000 INJECTION INTRAVENOUS; SUBCUTANEOUS at 05:05

## 2022-02-08 RX ADMIN — METOPROLOL SUCCINATE 25 MG: 25 TABLET, EXTENDED RELEASE ORAL at 08:37

## 2022-02-08 RX ADMIN — SODIUM CHLORIDE, SODIUM GLUCONATE, SODIUM ACETATE, POTASSIUM CHLORIDE AND MAGNESIUM CHLORIDE 75 ML/HR: 526; 502; 368; 37; 30 INJECTION, SOLUTION INTRAVENOUS at 01:13

## 2022-02-08 RX ADMIN — QUETIAPINE FUMARATE 25 MG: 25 TABLET ORAL at 08:37

## 2022-02-08 RX ADMIN — ACETAMINOPHEN 650 MG: 325 TABLET, FILM COATED ORAL at 22:11

## 2022-02-08 RX ADMIN — OXYCODONE HYDROCHLORIDE 5 MG: 5 TABLET ORAL at 01:18

## 2022-02-08 RX ADMIN — HEPARIN SODIUM 5000 UNITS: 5000 INJECTION INTRAVENOUS; SUBCUTANEOUS at 15:00

## 2022-02-08 RX ADMIN — TIMOLOL MALEATE 1 DROP: 5 SOLUTION/ DROPS OPHTHALMIC at 08:46

## 2022-02-08 RX ADMIN — ACETAMINOPHEN 650 MG: 325 TABLET, FILM COATED ORAL at 08:34

## 2022-02-08 RX ADMIN — POTASSIUM CHLORIDE 10 MEQ: 750 TABLET, EXTENDED RELEASE ORAL at 18:50

## 2022-02-08 RX ADMIN — QUETIAPINE FUMARATE 25 MG: 25 TABLET ORAL at 18:50

## 2022-02-08 RX ADMIN — HEPARIN SODIUM 5000 UNITS: 5000 INJECTION INTRAVENOUS; SUBCUTANEOUS at 22:11

## 2022-02-08 RX ADMIN — DONEPEZIL HYDROCHLORIDE 10 MG: 5 TABLET, FILM COATED ORAL at 22:11

## 2022-02-08 RX ADMIN — TIMOLOL MALEATE 1 DROP: 5 SOLUTION/ DROPS OPHTHALMIC at 17:00

## 2022-02-08 RX ADMIN — ACETAMINOPHEN 650 MG: 325 TABLET, FILM COATED ORAL at 18:50

## 2022-02-08 RX ADMIN — OXYCODONE HYDROCHLORIDE 5 MG: 5 TABLET ORAL at 12:22

## 2022-02-08 RX ADMIN — POTASSIUM CHLORIDE 10 MEQ: 750 TABLET, EXTENDED RELEASE ORAL at 08:34

## 2022-02-08 RX ADMIN — ACETAMINOPHEN 650 MG: 325 TABLET, FILM COATED ORAL at 12:20

## 2022-02-08 RX ADMIN — PANTOPRAZOLE SODIUM 40 MG: 40 TABLET, DELAYED RELEASE ORAL at 05:05

## 2022-02-08 RX ADMIN — SERTRALINE HYDROCHLORIDE 100 MG: 100 TABLET ORAL at 08:34

## 2022-02-08 RX ADMIN — SODIUM CHLORIDE, SODIUM GLUCONATE, SODIUM ACETATE, POTASSIUM CHLORIDE, MAGNESIUM CHLORIDE, SODIUM PHOSPHATE, DIBASIC, AND POTASSIUM PHOSPHATE 75 ML/HR: .53; .5; .37; .037; .03; .012; .00082 INJECTION, SOLUTION INTRAVENOUS at 08:43

## 2022-02-08 NOTE — PLAN OF CARE
Problem: Nutrition/Hydration-ADULT  Goal: Nutrient/Hydration intake appropriate for improving, restoring or maintaining nutritional needs  Description: Monitor and assess patient's nutrition/hydration status for malnutrition  Collaborate with interdisciplinary team and initiate plan and interventions as ordered  Monitor patient's weight and dietary intake as ordered or per policy  Utilize nutrition screening tool and intervene as necessary  Determine patient's food preferences and provide high-protein, high-caloric foods as appropriate       INTERVENTIONS:  - Monitor oral intake, urinary output, labs, and treatment plans  - Assess nutrition and hydration status and recommend course of action  - Evaluate amount of meals eaten  - Assist patient with eating if necessary   - Allow adequate time for meals  - Recommend/ encourage appropriate diets, oral nutritional supplements, and vitamin/mineral supplements  - Order, calculate, and assess calorie counts as needed  - Recommend, monitor, and adjust tube feedings and TPN/PPN based on assessed needs  - Assess need for intravenous fluids  - Provide specific nutrition/hydration education as appropriate  - Include patient/family/caregiver in decisions related to nutrition  Outcome: Progressing     Problem: MUSCULOSKELETAL - ADULT  Goal: Maintain or return mobility to safest level of function  Description: INTERVENTIONS:  - Assess patient's ability to carry out ADLs; assess patient's baseline for ADL function and identify physical deficits which impact ability to perform ADLs (bathing, care of mouth/teeth, toileting, grooming, dressing, etc )  - Assess/evaluate cause of self-care deficits   - Assess range of motion  - Assess patient's mobility  - Assess patient's need for assistive devices and provide as appropriate  - Encourage maximum independence but intervene and supervise when necessary  - Involve family in performance of ADLs  - Assess for home care needs following discharge   - Consider OT consult to assist with ADL evaluation and planning for discharge  - Provide patient education as appropriate  Outcome: Progressing  Goal: Maintain proper alignment of affected body part  Description: INTERVENTIONS:  - Support, maintain and protect limb and body alignment  - Provide patient/ family with appropriate education  Outcome: Progressing     Problem: MOBILITY - ADULT  Goal: Maintain or return to baseline ADL function  Description: INTERVENTIONS:  -  Assess patient's ability to carry out ADLs; assess patient's baseline for ADL function and identify physical deficits which impact ability to perform ADLs (bathing, care of mouth/teeth, toileting, grooming, dressing, etc )  - Assess/evaluate cause of self-care deficits   - Assess range of motion  - Assess patient's mobility; develop plan if impaired  - Assess patient's need for assistive devices and provide as appropriate  - Encourage maximum independence but intervene and supervise when necessary  - Involve family in performance of ADLs  - Assess for home care needs following discharge   - Consider OT consult to assist with ADL evaluation and planning for discharge  - Provide patient education as appropriate  Outcome: Progressing  Goal: Maintains/Returns to pre admission functional level  Description: INTERVENTIONS:  - Perform BMAT or MOVE assessment daily    - Set and communicate daily mobility goal to care team and patient/family/caregiver  - Collaborate with rehabilitation services on mobility goals if consulted  - Perform Range of Motion 2 times a day  - Reposition patient every 2 hours    - Dangle patient 2 times a day  - Stand patient 2 times a day  - Ambulate patient 2 times a day  - Out of bed to chair 2 times a day   - Out of bed for meals 2 times a day  - Out of bed for toileting  - Record patient progress and toleration of activity level   Outcome: Progressing     Problem: Potential for Falls  Goal: Patient will remain free of falls  Description: INTERVENTIONS:  - Educate patient/family on patient safety including physical limitations  - Instruct patient to call for assistance with activity   - Consult OT/PT to assist with strengthening/mobility   - Keep Call bell within reach  - Keep bed low and locked with side rails adjusted as appropriate  - Keep care items and personal belongings within reach  - Initiate and maintain comfort rounds  - Make Fall Risk Sign visible to staff  - Offer Toileting every 2 Hours, in advance of need  - Initiate/Maintain bed/chair alarm  - Obtain necessary fall risk management equipment: call bell   - Apply yellow socks and bracelet for high fall risk patients  - Consider moving patient to room near nurses station  Outcome: Progressing     Problem: Prexisting or High Potential for Compromised Skin Integrity  Goal: Skin integrity is maintained or improved  Description: INTERVENTIONS:  - Identify patients at risk for skin breakdown  - Assess and monitor skin integrity  - Assess and monitor nutrition and hydration status  - Monitor labs   - Assess for incontinence   - Turn and reposition patient  - Assist with mobility/ambulation  - Relieve pressure over bony prominences  - Avoid friction and shearing  - Provide appropriate hygiene as needed including keeping skin clean and dry  - Evaluate need for skin moisturizer/barrier cream  - Collaborate with interdisciplinary team   - Patient/family teaching  - Consider wound care consult   Outcome: Progressing

## 2022-02-08 NOTE — PLAN OF CARE
ssessment:  Pt presented c s/s significant pain and sub-optimal positioning (given the extent of her discomfort in hip and neck, I was unable to raise bed beyond 40* or get head into neutral position but support behind head and on L side of head provided)  Pt presented with limited intake and conservative diet should continue  Plan/Recommendations:  Continue pureed food, nectar thick liquid  Continue pain control as indicated  SLP to continue f/u min of 3x weekly to ensure safe intake of least restrictive diet and continue caregiver education and support (dtr reportedly plans to visit today as she is now back in [place])

## 2022-02-08 NOTE — PROGRESS NOTES
[facility] 44  Progress Note - Eitan Iyer 1940, 79 y o  female MRN: [id number]  Unit/Bed#: [address] [place] [address] Encounter: [id number]  Primary Care Provider: Pramod Hernandez MD   Date and time admitted to hospital: 2/4/2022  8:33 PM      * Femoral neck fracture  Assessment & Plan  S/p right femoral intertrochanteric nailing on 2/5 (POD #3)  PRN pain control  Continue postoperative PT/OT -> recommending skilled rehab once medically stable  Continue postoperative DVT prophylaxis for 4 weeks  See plan for blood-loss anemia below    Acute kidney injury superimposed on chronic kidney disease stage 3  Assessment & Plan  Baseline creatinine approximately 1 3-1 4 - currently improved @ 2 35 from a peak of 2 81 s/p low rate IV fluids (monitor for iatrogenic fluid overload in the setting of CHF)  Monitor renal function and urine output - avoid hypotension as possible - limit/avoid nephrotoxins as possible - holding Lasix/Entresto  Nephrology following -> renal/bladder ultrasound pending  Anticipate discharge to skilled rehab once renal function stabilized    Chronic combined systolic and diastolic CHF  Assessment & Plan  EF of 65% w/ mild biatrial dilatation,, mild-moderate TR, but no significant regional LV wall motion abnormalities  On beta-blockade with Toprol-XL - holding Entresto/Lasix in the setting of acute kidney injury - resume diuresis once renal function stabilized  Monitor/replete magnesium/potassium deficiencies if present    Alzheimer's dementia  Assessment & Plan  Continue Aricept/Seroquel/Zoloft    Essential hypertension  Assessment & Plan  Continue Toprol-XL    Postoperative blood loss anemia  Assessment & Plan  Preoperative hemoglobin of 12 1 -> 10 4 -> 9 0 today  Monitor H/H and transfuse if necessary    Leukocytosis  Assessment & Plan  Likely reactive due to acute medical issue(s)  Monitor WBC count - normalized      DVT Prophylaxis:  Heparin SC      Patient Centered Rounds:  I have performed bedside rounds and discussed plan of care with nursing today  Discussions with Specialists or Other Care Team Provider:  see above assessments if applicable    Education and Discussions with Family / Patient:  Patient at bedside    Time Spent for Care:  32 minutes  More than 50% of total time spent on counseling and coordination of care as described above  Current Length of Stay: 4 day(s)    Current Patient Status: Inpatient   Certification Statement:  Patient will continue to require additional hospital stay due to assessments as noted above  Code Status: Level 3 - DNAR and DNI        Subjective:     Seen/examined earlier today  No acute complaints at this time  Remains weak/fatigued  Objective:     Vitals:   Temp (24hrs), Av °F (36 7 °C), Min:97 5 °F (36 4 °C), Max:98 5 °F (36 9 °C)    Temp:  [97 5 °F (36 4 °C)-98 5 °F (36 9 °C)] 97 5 °F (36 4 °C)  HR:  [] 68  Resp:  [18] 18  BP: (102-121)/(48-63) 121/51  SpO2:  [96 %-97 %] 97 %  Body mass index is 23 11 kg/m²  Input and Output Summary (last 24 hours):        Intake/Output Summary (Last 24 hours) at 2022 1432  Last data filed at 2022 2301  Gross per 24 hour   Intake --   Output 50 ml   Net -50 ml       Physical Exam:     GENERAL:  Weak/fatigued  HEAD:  Normocephalic - atraumatic  EYES: PERRL - EOMI   MOUTH:  Mucosa moist  NECK:  Supple - full range of motion  CARDIAC:  Rate controlled - S1/S2 positive  PULMONARY:  Mildly diminished at the bases but fairly clear otherwise - nonlabored respirations at rest  ABDOMEN:  Soft - nontender/nondistended - active bowel sounds  MUSCULOSKELETAL:  Motor strength/range of motion deconditioned s/p right intertrochanteric nailing  NEUROLOGIC:  At baseline  PSYCHIATRIC:  Mood/affect stable      Additional Data:     Labs & Recent Cultures:    Results from last 7 days   Lab Units 22  0515 22  0530 22  1005   WBC Thousand/uL 9 32   < > 14 00*   HEMOGLOBIN g/dL 9 0* < > 14 5   HEMATOCRIT % 28 8*   < > 42 8   PLATELETS Thousands/uL 211   < > 221   NEUTROS PCT %  --   --  89*   LYMPHS PCT %  --   --  2*   MONOS PCT %  --   --  8   EOS PCT %  --   --  0    < > = values in this interval not displayed  Results from last 7 days   Lab Units 02/08/22  0515   POTASSIUM mmol/L 4 3   CHLORIDE mmol/L 111*   CO2 mmol/L 23   BUN mg/dL 84*   CREATININE mg/dL 2 35*   CALCIUM mg/dL 8 4   ALK PHOS U/L 63   ALT U/L 24   AST U/L 206*     Results from last 7 days   Lab Units 02/04/22  1004   INR  1 03     Results from last 7 days   Lab Units 02/06/22  1105 02/06/22  0650 02/05/22  2051 02/05/22  1539 02/05/22  0656   POC GLUCOSE mg/dl 133 120 116 121 105                         Last 24 Hours Medication List:   Current Facility-Administered Medications   Medication Dose Route Frequency Provider Last Rate    acetaminophen  650 mg Oral 4x Daily Tom Caballero DO      donepezil  10 mg Oral HS Colten Bahena, CRISTINO      heparin (porcine)  5,000 Units Subcutaneous [facility] Phi Ivan PA-C      hydrALAZINE  5 mg Intravenous Q4H PRN Colten Bahena, PA-NEENA      latanoprost  1 drop Both Eyes HS Coltenkeith Bahena, CRISTINO      metoprolol succinate  25 mg Oral Daily [place], [place]      multi-electrolyte  75 mL/hr Intravenous Continuous Mingo ChallengeMD sudhir 75 mL/hr (02/08/22 0843)    ondansetron  4 mg Intravenous Q6H PRN INA Kaplan      oxyCODONE  2 5 mg Oral Q4H PRN Coltenkeith Bahena, PA-NEENA      oxyCODONE  5 mg Oral Q4H PRN Colten Bahena, PA-NEENA      pantoprazole  40 mg Oral Early Morning Colten Bahena, PA-C      potassium chloride  10 mEq Oral BID Colten Buckle, PA-C      QUEtiapine  25 mg Oral BID Colten Buckle, PA-C      sertraline  100 mg Oral Daily Colten Bahena, PA-NEENA      timolol  1 drop Both Eyes BID Colten Bahena PA-C                    ** Please Note: This note is constructed using a voice recognition dictation system    An occasional wrong word/phrase or sound-a-like substitution may have been picked up by dictation device due to the inherent limitations of voice recognition software  Read the chart carefully and recognize, using reasonable context, where substitutions may have occurred  **

## 2022-02-08 NOTE — ASSESSMENT & PLAN NOTE
Baseline creatinine approximately 1 3-1 4 - currently improved @ 2 35 from a peak of 2 81 s/p low rate IV fluids (monitor for iatrogenic fluid overload in the setting of CHF)  Monitor renal function and urine output - avoid hypotension as possible - limit/avoid nephrotoxins as possible - holding Lasix/Entresto  Nephrology following -> renal/bladder ultrasound pending  Anticipate discharge to skilled rehab once renal function stabilized

## 2022-02-08 NOTE — SPEECH THERAPY NOTE
SLP Progress Note    Patient Name: Brian Masters  [id number] Date: 2/8/2022     Problem List  Principal Problem:    Femoral neck fracture ([facility] )  Active Problems:    Alzheimer's dementia without behavioral disturbance (HCC)    Chronic combined systolic and diastolic CHF (congestive heart failure) ([facility] )    Essential hypertension    Acute kidney injury superimposed on CKD ([facility] )    Anemia    Subjective:  "I hurt all over" (while pointing to R hip/abdomen)  RNs (Viviana and Alison) aware of same and Alison prepared medication for administration)  Objective:  Pt was seen for diagnostic dysphagia therapy to ensure tolerance of current diet (puree/NTL)  Viviana CARLISLE and I slowly and gently repositioned pt at 40 degrees upright with head supported on L (pt with head turned srongly to her L this am and she c/o pain with even very limited and gentle attempts to move head from that position)  Pt was arouseable-alert, able to comprehend some v simple ?'s/commands and express v basic needs (although pt vague about location of pain)  Pt was assessed with smooth puree, meds crushed in yogurt and limited amount of nectar thick liquid by tsp and syringe  WEAK suck via straw noted  Impaired labial seal with tsps of oatmeal & NTLwith significant/severe labial leakage  Improved oral containment with yogurt  Leakage appeared to be related to weakness, limited pt effort and sub-optimal positioning  Synringe then used to administer 2-3 cc boluses with significantly improved bolus containment with all materials  Bolus formation and transfer were mod slowed with min oral residue noted  Swallow initiation appeared mildly delayed  Laryngeal rise was fair (?reduced by sub-optimal position)   Cough x1 noted with crushed medications in puree and thinning oatmeal     Assessment:  Pt presented c s/s significant pain and sub-optimal positioning (given the extent of her discomfort in hip and neck, I was unable to raise bed beyond 40* or get head into neutral position but support behind head and on L side of head provided)  Pt presented with limited intake and conservative diet should continue  Plan/Recommendations:  Continue pureed food, nectar thick liquid  Continue pain control as indicated  SLP to continue f/u min of 3x weekly to ensure safe intake of least restrictive diet and continue caregiver education and support (dtr reportedly plans to visit today as she is now back in [place])      Diana Lomeli [facility] , [place] [phone]

## 2022-02-08 NOTE — ASSESSMENT & PLAN NOTE
EF of 65% w/ mild biatrial dilatation,, mild-moderate TR, but no significant regional LV wall motion abnormalities  On beta-blockade with Toprol-XL - holding Entresto/Lasix in the setting of acute kidney injury - resume diuresis once renal function stabilized  Monitor/replete magnesium/potassium deficiencies if present

## 2022-02-08 NOTE — OCCUPATIONAL THERAPY NOTE
OT TREATMENT     02/08/22 0910   Note Type   Note Type Treatment   Restrictions/Precautions   RLE Weight Bearing Per Order WBAT   Other Precautions Cognitive; Chair Alarm; Bed Alarm; Fall Risk;Pain   Pain Assessment   Pain Assessment Tool Corey-Baker FACES   Pain Score 8   Pain Location/Orientation Orientation: Right;Location: Hip  (with activity )   ADL   Toileting Assistance  1  Total Assistance   Toileting Deficit Perineal hygiene   Toileting Comments pt incontinent of BM, rolling R and L to complete hygiene   Bed Mobility   Rolling R 4  Minimal assistance   Additional items Assist x 1;Verbal cues   Rolling L 2  Maximal assistance   Additional items Assist x 1;Verbal cues   Transfers   Sit to Stand 2  Maximal assistance   Additional items Assist x 2;Verbal cues   Stand to Sit 2  Maximal assistance   Additional items Assist x 2;Verbal cues   Stand pivot 2  Maximal assistance   Additional items Assist x 2;Verbal cues  (hand hold assist bed to chair )   Cognition   Overall Cognitive Status Impaired   Arousal/Participation Lethargic   Attention Difficulty attending to directions   Orientation Level Oriented to person   Following Commands Follows one step commands inconsistently   Assessment   Assessment Patient seen for OT treatment  Patient is more lethargic today  Patient requires max assist of 2 for transfers and dependent for toileting  Patient will benefit from continued OT services to maximize functional performance with ADLS  The patient's raw score on the -PAC Daily Activity inpatient short form low function score is 10, standardized score is Low Function Daily Activity Standardized Score: 17 31  Patients with a standardized score less than 39 4 are likely to benefit from discharge to post-acute rehab services  Please refer to the recommendation of the Occupational Therapist for safe discharge planning  Plan   Treatment Interventions ADL retraining;Functional transfer training; Endurance training;Patient/family training;Equipment evaluation/education; Activityengagement; Compensatory technique education   OT Frequency 5x/wk   Recommendation   OT Discharge Recommendation Post acute rehabilitation services   AM-PAC Daily Activity Inpatient   Lower Body Dressing 1   Bathing 1   Toileting 1   Upper Body Dressing 1   Grooming 1   Eating 1   Daily Activity Raw Score 6   Turning Head Towards Sound 2   Follow Simple Instructions 2   Low Function Daily Activity Raw Score 10   Low Function Daily Activity Standardized Score 17 31   AM-PAC Applied Cognition Inpatient   Following a Speech/Presentation 2   Understanding Ordinary Conversation 2   Taking Medications 1   Remembering Where Things Are Placed or Put Away 1   Remembering List of 4-5 Errands 1   Taking Care of Complicated Tasks 1   Applied Cognition Raw Score 8   Applied Cognition Standardized Score 09 58   Licensure   [place] License Number  [place] [address] OTR/L [id number]

## 2022-02-08 NOTE — PHYSICAL THERAPY NOTE
PT TREATMENT     02/08/22 0935   PT Last Visit   PT Visit Date 02/08/22   Note Type   Note Type Treatment   Pain Assessment   Pain Assessment Tool Corey-Baker FACES   Corey-Baker FACES Pain Rating 8  (With mobility; per RN pt given pain med prior to PT)   Restrictions/Precautions   RLE Weight Bearing Per Order WBAT   Other Precautions Pain; Fall Risk;Bed Alarm; Chair Alarm;Cognitive   General   Chart Reviewed Yes   Cognition   Overall Cognitive Status Impaired   Arousal/Participation Persistent stimuli required  (Pt is lethargic)   Attention Difficulty attending to directions   Orientation Level Oriented to person;Oriented to place  [facility])   Following Commands Follows one step commands inconsistently   Subjective   Subjective Pt cries out in pain with gentle RLE exercise and functional mobility   Bed Mobility   Rolling R 2  Maximal assistance   Additional items Assist x 1;Verbal cues   Rolling L 2  Maximal assistance   Additional items Assist x 1;Verbal cues   Supine to Sit 2  Maximal assistance   Additional items Assist x 2;Verbal cues   Additional Comments Pt with max assist of 1 to maintain sitting balance edge of bed   Transfers   Sit to Stand 2  Maximal assistance   Additional items Assist x 2;Verbal cues   Stand to Sit 2  Maximal assistance   Additional items Assist x 2;Verbal cues   Stand pivot 2  Maximal assistance   Additional items Assist x 2;Verbal cues  (Bed to chair with max handhold assist of 2)   Balance   Static Sitting Poor   Static Standing Poor -   Dynamic Standing Poor -   Activity Tolerance   Activity Tolerance Patient limited by fatigue;Patient limited by pain;Treatment limited secondary to medical complications (Comment)  (Impaired cognition)   Exercises   Heelslides PROM;10 reps;Bilateral;Supine   Hip Abduction PROM;10 reps;Bilateral;Supine   Knee AROM Short Arc Quad PROM;10 reps;Bilateral;Supine   Ankle Pumps PROM;10 reps;Bilateral;Supine   Assessment   Assessment Pt remains lethargic during PT session, opens her eyes briefly when in pain during gentle RLE exercises and functional mobility  Functionally, pt continues to need max assist of 1-2 persons for all mobility  The patient's AM-[facility] Basic Mobility Inpatient Short Form Raw Score is 7  A Raw score of less than or equal to 16 suggests the patient may benefit from discharge to post-acute rehabilitation services  Please also refer to the recommendation of the Physical Therapist for safe discharge planning  Plan   Treatment/Interventions ADL retraining;Functional transfer training;LE strengthening/ROM; Therapeutic exercise; Endurance training;Cognitive reorientation;Patient/family training;Equipment eval/education; Bed mobility;Gait training; Compensatory technique education   PT Frequency Other (Comment)  (Daily)   Recommendation   PT Discharge Recommendation Post acute rehabilitation services   AM-PAC Basic Mobility Inpatient   Turning in Bed Without Bedrails 2   Lying on Back to Sitting on Edge of Flat Bed 1   Moving Bed to Chair 1   Standing Up From Chair 1   Walk in Room 1   Climb 3-5 Stairs 1   Basic Mobility Inpatient Raw Score 7   Turning Head Towards Sound 2   Follow Simple Instructions 2   Low Function Basic Mobility Raw Score 11   Low Function Basic Mobility Standardized Score 16 55   Highest Level Of Mobility   JH-HLM Goal 2: Bed activities/Dependent transfer   JH-HLM Highest Level of Mobility 4: Move to chair/commode   JH-HLM Goal Achieved Yes   Education   Education Provided Mobility training   Patient Reinforcement needed   Licensure   [place] Number  Yoshismith Burgerpepe  [id number]     Portions of the documentation may have been created using voice recognition software  Occasional wrong word or sound alike substitutions may have occurred due to the inherent limitation of the voice recognition software  Read the chart carefully and recognize, using context, where substitutions have occurred

## 2022-02-08 NOTE — PLAN OF CARE
Problem: MUSCULOSKELETAL - ADULT  Goal: Maintain or return mobility to safest level of function  Description: INTERVENTIONS:  - Assess patient's ability to carry out ADLs; assess patient's baseline for ADL function and identify physical deficits which impact ability to perform ADLs (bathing, care of mouth/teeth, toileting, grooming, dressing, etc )  - Assess/evaluate cause of self-care deficits   - Assess range of motion  - Assess patient's mobility  - Assess patient's need for assistive devices and provide as appropriate  - Encourage maximum independence but intervene and supervise when necessary  - Involve family in performance of ADLs  - Assess for home care needs following discharge   - Consider OT consult to assist with ADL evaluation and planning for discharge  - Provide patient education as appropriate  Outcome: Progressing  Goal: Maintain proper alignment of affected body part  Description: INTERVENTIONS:  - Support, maintain and protect limb and body alignment  - Provide patient/ family with appropriate education  Outcome: Progressing     Problem: MOBILITY - ADULT  Goal: Maintain or return to baseline ADL function  Description: INTERVENTIONS:  -  Assess patient's ability to carry out ADLs; assess patient's baseline for ADL function and identify physical deficits which impact ability to perform ADLs (bathing, care of mouth/teeth, toileting, grooming, dressing, etc )  - Assess/evaluate cause of self-care deficits   - Assess range of motion  - Assess patient's mobility; develop plan if impaired  - Assess patient's need for assistive devices and provide as appropriate  - Encourage maximum independence but intervene and supervise when necessary  - Involve family in performance of ADLs  - Assess for home care needs following discharge   - Consider OT consult to assist with ADL evaluation and planning for discharge  - Provide patient education as appropriate  Outcome: Progressing     Problem: Potential for Falls  Goal: Patient will remain free of falls  Description: INTERVENTIONS:  - Educate patient/family on patient safety including physical limitations  - Instruct patient to call for assistance with activity   - Consult OT/PT to assist with strengthening/mobility   - Keep Call bell within reach  - Keep bed low and locked with side rails adjusted as appropriate  - Keep care items and personal belongings within reach  - Initiate and maintain comfort rounds  - Make Fall Risk Sign visible to staff  - Offer Toileting every 2 Hours, in advance of need  - Initiate/Maintain bed alarm  - Apply yellow socks and bracelet for high fall risk patients  - Consider moving patient to room near nurses station  Outcome: Progressing     Problem: Prexisting or High Potential for Compromised Skin Integrity  Goal: Skin integrity is maintained or improved  Description: INTERVENTIONS:  - Identify patients at risk for skin breakdown  - Assess and monitor skin integrity  - Assess and monitor nutrition and hydration status  - Monitor labs   - Assess for incontinence   - Turn and reposition patient  - Assist with mobility/ambulation  - Relieve pressure over bony prominences  - Avoid friction and shearing  - Provide appropriate hygiene as needed including keeping skin clean and dry  - Evaluate need for skin moisturizer/barrier cream  - Collaborate with interdisciplinary team   - Patient/family teaching  - Consider wound care consult   Outcome: Progressing

## 2022-02-08 NOTE — PROGRESS NOTES
NEPHROLOGY PROGRESS NOTE   Julia Cameron 79 y o  female MRN: [id number]  Unit/Bed#: 207 Jessica Hernandez Encounter: [id number]  Reason for Consult: DUARTE    ASSESSMENT AND PLAN:   79 y o  woman PMH of CKD G3b (baseline creatinine 1 3-1 4 mg/dL), HTB, CHF, Alzheimer's dementia p/s right femoral fracture s/p intertrochanteric nailing  Nephrology is consulted for DUARTE     PLAN     #Non-Oliguric KDIGO DUARTE stage 1  · Etiology:  likely secondary to hemodynamic changes in the settings of hypotension ,  Entresto and poor oral intake  · Baseline creatinine 1 3-1 4 mg/dL  · Peak creatinine:  2 81 milligrams/deciliter  · Current creatinine:  2 35 milligrams/deciliter, trending down  · UA:  Ordered  · Renal imaging :  Ultrasound ordered  · Treatment:  · No indication of urgent dialysis at this time, will continue to monitor kidney function  · Maintain MAP:  Over 65 mmHg if possible/avoid hypoperfusion:  Hold parameters on blood pressure medications  · Avoid nephrotoxic agents such as NSAIDs, and IV contrast if possible   Avoid opioids   · Adjust medications to GFR  · Agree with holding Entresto  · Continue with gentle hydration on due to poor oral intake with Plasmalyte 75 mL/hour   · Monitor urinary output, recommend external catheter    #CKD G3b  · Baseline creatinine:  1 3-1 4 mg/dL  · Etiology:  Likely secondary to nephroangiosclerosis secondary to hypertension and nephron loss due to aging         #Acid-base Disorder  · serum HCO3 23mmol/l, goal>21  · At goal  · A corrected by albumin  · No indication of sodium bicarb, acidosis has improved     #Volume status/hypertension:  · Volume:   more euvolemic  · Blood pressure:   normotensive /50mmHg, goal <140/90mmhg   · Recommend:  · Continue holding Entresto   · Low-sodium diet  · IV fluids as above for 24 hours  · Enforce fluid intake     #Anemia:  · Current hemoglobin:  9 mg/dL  · Secondary to recent surgical intervention  · Treatment:  · Transfuse for hemoglobin less than 7 0 per primary service       # femoral fracture  · S/p intervention  · Management as per primary team      SUBJECTIVE:  Patient seen and examined at bedside  Patient denies shortness of breath, no chest pain    Only 50 cc of urinary output recorded    OBJECTIVE:  Current Weight: Weight - Scale: 68 9 kg (152 lb)  Vitals:    02/07/22 2257   BP: 115/50   Pulse: 59   Resp: 18   Temp: 97 5 °F (36 4 °C)   SpO2: 97%       Intake/Output Summary (Last 24 hours) at 2/8/2022 0751  Last data filed at 2/7/2022 2301  Gross per 24 hour   Intake 10 ml   Output 50 ml   Net -40 ml     Wt Readings from Last 3 Encounters:   02/07/22 68 9 kg (152 lb)   02/04/22 61 2 kg (135 lb)   11/05/20 55 3 kg (122 lb)     Temp Readings from Last 3 Encounters:   02/07/22 97 5 °F (36 4 °C) (Axillary)   02/04/22 (!) 97 3 °F (36 3 °C) (Oral)   11/05/20 (!) 96 5 °F (35 8 °C)     BP Readings from Last 3 Encounters:   02/07/22 115/50   02/04/22 161/74   11/05/20 116/68     Pulse Readings from Last 3 Encounters:   02/07/22 59   02/04/22 98   11/05/20 82      General:  Elderly, no acute distress at this time  Skin:  No acute rash  Eyes:  No scleral icterus and noninjected  ENT:   mucous membranes moist  Neck:  no jugular venous distention, no carotid bruits,   Chest:  Clear to auscultation percussion, good respiratory effort, no use of accessory respiratory muscles  CVS:  Regular rate and rhythm without a murmur rub   Abdomen:  soft and nontender   Extremities:  Minimal pedal edema  Neuro:  No gross focality  Psych:  Alert , cooperative      Medications:    Current Facility-Administered Medications:     acetaminophen (TYLENOL) tablet 650 mg, 650 mg, Oral, 4x Daily, Tom Caballero DO, 650 mg at 02/07/22 2157    donepezil (ARICEPT) tablet 10 mg, 10 mg, Oral, HS, Terrance Laura, PA-C, 10 mg at 02/07/22 2157    heparin (porcine) subcutaneous injection 5,000 Units, 5,000 Units, Subcutaneous, Q8H [address], Terrance Laura, PA-C, 5,000 Units at 02/08/22 0507   hydrALAZINE (APRESOLINE) injection 5 mg, 5 mg, Intravenous, Q4H PRN, Crissie Balder, PA-C, 5 mg at 02/04/22 2330    latanoprost (XALATAN) 0 005 % ophthalmic solution 1 drop, 1 drop, Both Eyes, HS, Crissie Balder, PA-C, 1 drop at 02/07/22 2157    metoprolol succinate (TOPROL-XL) 24 hr tablet 25 mg, 25 mg, Oral, Daily, Crissie Balder, PA-C, 25 mg at 02/07/22 0930    multi-electrolyte (PLASMALYTE-A/ISOLYTE-S PH 7 4) IV solution, 75 mL/hr, Intravenous, Continuous, Karissa Marie MD, Last Rate: 75 mL/hr at 02/08/22 0113, 75 mL/hr at 02/08/22 0113    ondansetron (ZOFRAN) injection 4 mg, 4 mg, Intravenous, Q6H PRN, INA Bruce    oxyCODONE (ROXICODONE) IR tablet 2 5 mg, 2 5 mg, Oral, Q4H PRN, Kimmysie Balder, PA-C    oxyCODONE (ROXICODONE) IR tablet 5 mg, 5 mg, Oral, Q4H PRN, Crissie Balder, PA-C, 5 mg at 02/08/22 0118    pantoprazole (PROTONIX) EC tablet 40 mg, 40 mg, Oral, Early Morning, Kimmysie Balder, PA-C, 40 mg at 02/08/22 0505    potassium chloride (K-DUR,KLOR-CON) CR tablet 10 mEq, 10 mEq, Oral, BID, Crissie Balder, PA-C, 10 mEq at 02/07/22 1745    QUEtiapine (SEROquel) tablet 25 mg, 25 mg, Oral, BID, Crissie Balder, PA-C, 25 mg at 02/07/22 1745    sertraline (ZOLOFT) tablet 100 mg, 100 mg, Oral, Daily, Crissie Balder, PA-C, 100 mg at 02/07/22 0930    timolol (TIMOPTIC) 0 5 % ophthalmic solution 1 drop, 1 drop, Both Eyes, BID, Rubine Balder, PA-C, 1 drop at 02/07/22 1745    Laboratory Results:  Results from last 7 days   Lab Units 02/08/22  0515 02/07/22  0546 02/06/22  0517 02/05/22  0530 02/04/22  1005   WBC Thousand/uL 9 32 12 30* 9 09 9 67 14 00*   HEMOGLOBIN g/dL 9 0* 10 4* 10 4* 12 1 14 5   HEMATOCRIT % 28 8* 33 0* 32 2* 36 0 42 8   PLATELETS Thousands/uL 211 204 195 178 221   SODIUM mmol/L 144 140 136 138 137   POTASSIUM mmol/L 4 3 4 9 4 1 4 0 3 5   CHLORIDE mmol/L 111* 107 105 105 101   CO2 mmol/L 23 22 25 25 21*   BUN mg/dL 84* 75* 44* 32* 23*   CREATININE mg/dL 2 35* 2 81* 1 79* 1 51* 1 36*   CALCIUM mg/dL 8 4 8 6 8 1* 9 1 9 9   MAGNESIUM mg/dL  --   --   --  2 0  --        XR hip/pelv 2-3 vws right if performed   Final Result by Vallery Habermann, MD (02/07 4695)      Fluoroscopic guidance provided for procedure guidance  Please refer to the separate procedure notes for additional details  Workstation performed: [id number]          kidney and bladder    (Results Pending)       Portions of the record may have been created with voice recognition software  Occasional wrong word or "sound a like" substitutions may have occurred due to the inherent limitations of voice recognition software  Read the chart carefully and recognize, using context, where substitutions have occurred

## 2022-02-08 NOTE — ASSESSMENT & PLAN NOTE
S/p right femoral intertrochanteric nailing on 2/5 (POD #3)  PRN pain control  Continue postoperative PT/OT -> recommending skilled rehab once medically stable  Continue postoperative DVT prophylaxis for 4 weeks  See plan for blood-loss anemia below

## 2022-02-08 NOTE — PLAN OF CARE
Problem: OCCUPATIONAL THERAPY ADULT  Goal: Performs self-care activities at highest level of function for planned discharge setting  See evaluation for individualized goals  Description: Treatment Interventions: ADL retraining,Functional transfer training,UE strengthening/ROM,Endurance training,Patient/family training,Equipment evaluation/education,Cognitive reorientation,Activityengagement,Compensatory technique education          See flowsheet documentation for full assessment, interventions and recommendations  Outcome: Progressing  Note: Limitation: Decreased ADL status,Decreased UE strength,Decreased Safe judgement during ADL,Decreased UE ROM,Decreased cognition,Decreased endurance,Decreased high-level ADLs,Decreased self-care trans (decreased balance and mobility )  Prognosis: Good  Assessment: Patient seen for OT treatment  Patient is more lethargic today  Patient requires max assist of 2 for transfers and dependent for toileting  Patient will benefit from continued OT services to maximize functional performance with ADLS         OT Discharge Recommendation: Post acute rehabilitation services

## 2022-02-09 ENCOUNTER — APPOINTMENT (INPATIENT)
Dept: RADIOLOGY | Facility: HOSPITAL | Age: 82
DRG: 481 | End: 2022-02-09
Payer: COMMERCIAL

## 2022-02-09 PROBLEM — E87.0 HYPERNATREMIA: Status: ACTIVE | Noted: 2022-02-09

## 2022-02-09 LAB
ANION GAP SERPL CALCULATED.3IONS-SCNC: 11 MMOL/L (ref 4–13)
BASOPHILS # BLD AUTO: 0.01 THOUSANDS/ΜL (ref 0–0.1)
BASOPHILS NFR BLD AUTO: 0 % (ref 0–1)
BUN SERPL-MCNC: 74 MG/DL (ref 5–25)
CALCIUM SERPL-MCNC: 9.1 MG/DL (ref 8.3–10.1)
CHLORIDE SERPL-SCNC: 115 MMOL/L (ref 100–108)
CO2 SERPL-SCNC: 24 MMOL/L (ref 21–32)
CREAT SERPL-MCNC: 1.81 MG/DL (ref 0.6–1.3)
EOSINOPHIL # BLD AUTO: 0.04 THOUSAND/ΜL (ref 0–0.61)
EOSINOPHIL NFR BLD AUTO: 1 % (ref 0–6)
ERYTHROCYTE [DISTWIDTH] IN BLOOD BY AUTOMATED COUNT: 14.6 % (ref 11.6–15.1)
GFR SERPL CREATININE-BSD FRML MDRD: 25 ML/MIN/1.73SQ M
GLUCOSE SERPL-MCNC: 91 MG/DL (ref 65–140)
HCT VFR BLD AUTO: 31.8 % (ref 34.8–46.1)
HGB BLD-MCNC: 10 G/DL (ref 11.5–15.4)
IMM GRANULOCYTES # BLD AUTO: 0.09 THOUSAND/UL (ref 0–0.2)
IMM GRANULOCYTES NFR BLD AUTO: 1 % (ref 0–2)
LYMPHOCYTES # BLD AUTO: 0.6 THOUSANDS/ΜL (ref 0.6–4.47)
LYMPHOCYTES NFR BLD AUTO: 8 % (ref 14–44)
MAGNESIUM SERPL-MCNC: 2.6 MG/DL (ref 1.6–2.6)
MCH RBC QN AUTO: 30.9 PG (ref 26.8–34.3)
MCHC RBC AUTO-ENTMCNC: 31.4 G/DL (ref 31.4–37.4)
MCV RBC AUTO: 98 FL (ref 82–98)
MONOCYTES # BLD AUTO: 0.55 THOUSAND/ΜL (ref 0.17–1.22)
MONOCYTES NFR BLD AUTO: 7 % (ref 4–12)
NEUTROPHILS # BLD AUTO: 6.28 THOUSANDS/ΜL (ref 1.85–7.62)
NEUTS SEG NFR BLD AUTO: 83 % (ref 43–75)
NRBC BLD AUTO-RTO: 0 /100 WBCS
PHOSPHATE SERPL-MCNC: 2.9 MG/DL (ref 2.3–4.1)
PLATELET # BLD AUTO: 248 THOUSANDS/UL (ref 149–390)
PMV BLD AUTO: 10.6 FL (ref 8.9–12.7)
POTASSIUM SERPL-SCNC: 4.5 MMOL/L (ref 3.5–5.3)
RBC # BLD AUTO: 3.24 MILLION/UL (ref 3.81–5.12)
SODIUM SERPL-SCNC: 150 MMOL/L (ref 136–145)
WBC # BLD AUTO: 7.57 THOUSAND/UL (ref 4.31–10.16)

## 2022-02-09 PROCEDURE — 73560 X-RAY EXAM OF KNEE 1 OR 2: CPT

## 2022-02-09 PROCEDURE — 84100 ASSAY OF PHOSPHORUS: CPT | Performed by: INTERNAL MEDICINE

## 2022-02-09 PROCEDURE — 83735 ASSAY OF MAGNESIUM: CPT | Performed by: INTERNAL MEDICINE

## 2022-02-09 PROCEDURE — 97535 SELF CARE MNGMENT TRAINING: CPT

## 2022-02-09 PROCEDURE — 99024 POSTOP FOLLOW-UP VISIT: CPT | Performed by: PHYSICIAN ASSISTANT

## 2022-02-09 PROCEDURE — 97110 THERAPEUTIC EXERCISES: CPT

## 2022-02-09 PROCEDURE — 99232 SBSQ HOSP IP/OBS MODERATE 35: CPT | Performed by: INTERNAL MEDICINE

## 2022-02-09 PROCEDURE — 92526 ORAL FUNCTION THERAPY: CPT

## 2022-02-09 PROCEDURE — 80048 BASIC METABOLIC PNL TOTAL CA: CPT | Performed by: INTERNAL MEDICINE

## 2022-02-09 PROCEDURE — 85025 COMPLETE CBC W/AUTO DIFF WBC: CPT | Performed by: INTERNAL MEDICINE

## 2022-02-09 PROCEDURE — 99232 SBSQ HOSP IP/OBS MODERATE 35: CPT | Performed by: STUDENT IN AN ORGANIZED HEALTH CARE EDUCATION/TRAINING PROGRAM

## 2022-02-09 PROCEDURE — 97530 THERAPEUTIC ACTIVITIES: CPT

## 2022-02-09 RX ADMIN — ACETAMINOPHEN 650 MG: 325 TABLET, FILM COATED ORAL at 11:45

## 2022-02-09 RX ADMIN — ACETAMINOPHEN 650 MG: 325 TABLET, FILM COATED ORAL at 22:07

## 2022-02-09 RX ADMIN — LATANOPROST 1 DROP: 50 SOLUTION/ DROPS OPHTHALMIC at 22:07

## 2022-02-09 RX ADMIN — TIMOLOL MALEATE 1 DROP: 5 SOLUTION/ DROPS OPHTHALMIC at 08:59

## 2022-02-09 RX ADMIN — OXYCODONE HYDROCHLORIDE 5 MG: 5 TABLET ORAL at 06:16

## 2022-02-09 RX ADMIN — DONEPEZIL HYDROCHLORIDE 10 MG: 5 TABLET, FILM COATED ORAL at 22:07

## 2022-02-09 RX ADMIN — ENOXAPARIN SODIUM 30 MG: 30 INJECTION SUBCUTANEOUS at 14:20

## 2022-02-09 RX ADMIN — OXYCODONE HYDROCHLORIDE 5 MG: 5 TABLET ORAL at 12:08

## 2022-02-09 RX ADMIN — TIMOLOL MALEATE 1 DROP: 5 SOLUTION/ DROPS OPHTHALMIC at 17:34

## 2022-02-09 RX ADMIN — ACETAMINOPHEN 650 MG: 325 TABLET, FILM COATED ORAL at 17:34

## 2022-02-09 RX ADMIN — HEPARIN SODIUM 5000 UNITS: 5000 INJECTION INTRAVENOUS; SUBCUTANEOUS at 06:16

## 2022-02-09 RX ADMIN — SERTRALINE HYDROCHLORIDE 100 MG: 100 TABLET ORAL at 08:58

## 2022-02-09 RX ADMIN — QUETIAPINE FUMARATE 25 MG: 25 TABLET ORAL at 08:58

## 2022-02-09 RX ADMIN — DEXTROSE 500 ML: 5 SOLUTION INTRAVENOUS at 09:48

## 2022-02-09 RX ADMIN — QUETIAPINE FUMARATE 25 MG: 25 TABLET ORAL at 17:34

## 2022-02-09 RX ADMIN — ACETAMINOPHEN 650 MG: 325 TABLET, FILM COATED ORAL at 08:58

## 2022-02-09 RX ADMIN — PANTOPRAZOLE SODIUM 40 MG: 40 TABLET, DELAYED RELEASE ORAL at 06:16

## 2022-02-09 RX ADMIN — METOPROLOL SUCCINATE 25 MG: 25 TABLET, EXTENDED RELEASE ORAL at 08:58

## 2022-02-09 NOTE — UTILIZATION REVIEW
Continued Stay Review    Date: 2/9/22                           Current Patient Class: Inpatient    Current Level of Care: [facility]    HPI:81 y o  female initially admitted on  2/4/22 Femoral neck fracture, DUARTE, CKD, CHF    Assessment/Plan:   Pt is POD 4 intertrochanteric nailing of right femoral fx   DUARTE is slowly trending down today 1 8 ,patient was noted to be hypotensive pod 1 poor oral intake   Holding Entresto will monitor BP closely  Chronic combined systolic and diastolic heart failure hold entresto 2/2 DUARTE,  close monitoring , continue troporol  Per Nephrology DUARTE stage 1 with evidence of kidney recovery , left atrophic kidney, b/l CTs no hydro, Off IVF enforce fluid intake  Serum Na 150 this am etiology likely 2/2 na load and poor free water intake  Recommend D5W 500 cc today monitor bmp  Vital Signs:   02/09/22 07:34:23 98 5 °F (36 9 °C) 65 16 167/80 109 99 % None (Room air) Lying   02/09/22 0300 -- 70 -- -- -- 95 % -- --   02/08/22 22:59:52 97 6 °F (36 4 °C) 54 Abnormal  17 134/44 Abnormal  74 97 %       Pertinent Labs/Diagnostic Results:   2/8/22 US kidney and bladder  No hydronephrosis   2   Limited evaluation of the left kidney which may be atrophic versus foreshortening  3   Simple and minimally complex renal cysts  4   Cholelithiasis  2/7/22 XR riht hip Fluoroscopic guidance provided for procedure guidance   Please refer to the separate procedure notes for additional details     Results from last 7 days   Lab Units 02/04/22  1005   SARS-COV-2  Negative     Results from last 7 days   Lab Units 02/09/22  0626 02/08/22  0515 02/07/22  0546 02/06/22  0517 02/06/22  0517 02/05/22  0530 02/05/22  0530 02/04/22  1005 02/04/22  1005   WBC Thousand/uL 7 57 9 32 12 30*   < > 9 09   < > 9 67   < > 14 00*   HEMOGLOBIN g/dL 10 0* 9 0* 10 4*  --  10 4*  --  12 1   < > 14 5   HEMATOCRIT % 31 8* 28 8* 33 0*  --  32 2*  --  36 0   < > 42 8   PLATELETS Thousands/uL 248 211 204   < > 195   < > 178   < > 221   NEUTROS ABS Thousands/µL 6 28  --   --   --   --   --   --   --  12 40*    < > = values in this interval not displayed       Results from last 7 days   Lab Units 02/09/22  0626 02/08/22  0515 02/07/22  0546 02/06/22  0517 02/05/22  0530   SODIUM mmol/L 150* 144 140 136 138   POTASSIUM mmol/L 4 5 4 3 4 9 4 1 4 0   CHLORIDE mmol/L 115* 111* 107 105 105   CO2 mmol/L 24 23 22 25 25   ANION GAP mmol/L 11 10 11 6 8   BUN mg/dL 74* 84* 75* 44* 32*   CREATININE mg/dL 1 81* 2 35* 2 81* 1 79* 1 51*   EGFR ml/min/1 73sq m 25 18 15 26 31   CALCIUM mg/dL 9 1 8 4 8 6 8 1* 9 1   MAGNESIUM mg/dL 2 6  --   --   --  2 0   PHOSPHORUS mg/dL 2 9  --   --   --   --      Results from last 7 days   Lab Units 02/08/22  0515 02/07/22  0546 02/06/22  0517 02/04/22  1005   AST U/L 206* 155* 129* 97*   ALT U/L 24 17 21 18   ALK PHOS U/L 63 70 62 76 8   TOTAL PROTEIN g/dL 5 7* 6 3* 5 9* 7 8   ALBUMIN g/dL 2 3* 2 6* 2 6* 4 3   TOTAL BILIRUBIN mg/dL 0 86 1 08* 1 09* 1 07     Results from last 7 days   Lab Units 02/06/22  1105 02/06/22  0650 02/05/22  2051 02/05/22  1539 02/05/22  0656   POC GLUCOSE mg/dl 133 120 116 121 105     Results from last 7 days   Lab Units 02/09/22  0626 02/08/22  0515 02/07/22  0546 02/06/22  0517 02/05/22  0530 02/04/22  1005   GLUCOSE RANDOM mg/dL 91 92 111 111 99 160*       gmf  Daily weight I/o  Aspiration/fall  precaution   Bmp cbc diff  Mg phos  Dysphagia mod nectar thik liq  Pt ot sp  Medications:   Scheduled Medications:  acetaminophen, 650 mg, Oral, 4x Daily  dextrose, 500 mL, Intravenous, Once  donepezil, 10 mg, Oral, HS  heparin (porcine), 5,000 Units, Subcutaneous, Q8H DENISE  latanoprost, 1 drop, Both Eyes, HS  metoprolol succinate, 25 mg, Oral, Daily  pantoprazole, 40 mg, Oral, Early Morning  QUEtiapine, 25 mg, Oral, BID  sertraline, 100 mg, Oral, Daily  timolol, 1 drop, Both Eyes, BID      Continuous IV Infusions:   IV D5W 500ml   PRN Meds:  hydrALAZINE, 5 mg, Intravenous, Q4H PRN  ondansetron, 4 mg, Intravenous, Q6H PRN  oxyCODONE, 2 5 mg, Oral, Q4H PRN  oxyCODONE, 5 mg, Oral, Q4H PRN x4 in 24h        Discharge Plan: tbd    Network Utilization Review Department  ATTENTION: Please call with any questions or concerns to [phone] and carefully listen to the prompts so that you are directed to the right person  All voicemails are confidential   Danya Skipper all requests for admission clinical reviews, approved or denied determinations and any other requests to dedicated fax number below belonging to the campus where the patient is receiving treatment   List of dedicated fax numbers for the Facilities:  [address] DENIALS (Administrative/Medical Necessity) [phone]   [address] (Maternity/NICU/Pediatrics) [phone]   [address]  [address] [address] [place] [address] [address] [place] [address] [phone]

## 2022-02-09 NOTE — PROGRESS NOTES
NEPHROLOGY PROGRESS NOTE   Yanci Coker 79 y o  female MRN: [id number]  Unit/Bed#: 207 Jessica Hernandez Encounter: [id number]  Reason for Consult: DUARTE    ASSESSMENT AND PLAN:  79 y  o  woman PMH of CKD G3b (baseline creatinine 1 3-1 4 mg/dL), HTB, CHF, Alzheimer's dementia p/s right femoral fracture s/p intertrochanteric nailing   Nephrology is consulted for DUARTE     PLAN     #Non-Oliguric KDIGO DUARTE stage 1 with evidence of kidney recovery  · Etiology:  likely secondary to hemodynamic changes in the settings of hypotension ,  Entresto and poor oral intake  · Baseline creatinine 1 3-1 4 mg/dL  · Peak creatinine:  2 81 milligrams/deciliter  · Current creatinine:  1 81 mg/dL, trending down  · UA:  Ordered  · Renal imaging :   left atrophic kidney, bilateral CTs, no hydronephrosis  · Treatment:  · No indication of urgent dialysis at this time, kidney function is improving  · Maintain MAP:  Over 65 mmHg if possible/avoid hypoperfusion:  Hold parameters on blood pressure medications  · Avoid nephrotoxic agents such as NSAIDs, and IV contrast if possible   Avoid opioids   · Adjust medications to GFR  · Agree with holding Entresto  · Off IV fluid  · Enforce fluid intake     #CKD G3b  · Baseline creatinine:  1 3-1 4 mg/dL  · Etiology:  Likely secondary to nephroangiosclerosis secondary to hypertension and nephron loss due to aging      # hypernatremia  · Serum sodium 150 mEq this morning  · Etiology:  Secondary to sodium load and poor free water intake  · Recommend:  · D5W 500 cc today   · Monitor BMP  · Enforce free water intake    #Acid-base Disorder  · serum HCO3 24mmol/l, goal>21  · At goal  · A      #Volume status/hypertension:  · Volume:   euvolemic  · Blood pressure:    hypertensive /80mmHg, goal <140/90mmhg   · Recommend:  · Continue holding Entresto , can restart on discharge  · Low-sodium diet  · Enforce fluid intake     #Anemia:  · Current hemoglobin:  10 mg/dL  · Stable  · Treatment:  · Transfuse for hemoglobin less than 7 0 per primary service       # femoral fracture  · S/p intervention  · Management as per primary team        SUBJECTIVE:  Patient seen and examined at bedside  Patient is more awake and oriented, denies shortness of breath , denies chest pain  No urinary complaints         OBJECTIVE:  Current Weight: Weight - Scale: 68 6 kg (151 lb 3 8 oz)  Vitals:    02/09/22 0734   BP: 167/80   Pulse: 65   Resp: 16   Temp: 98 5 °F (36 9 °C)   SpO2: 99%       Intake/Output Summary (Last 24 hours) at 2/9/2022 1612  Last data filed at 2/8/2022 2001  Gross per 24 hour   Intake --   Output 200 ml   Net -200 ml     Wt Readings from Last 3 Encounters:   02/09/22 68 6 kg (151 lb 3 8 oz)   02/04/22 61 2 kg (135 lb)   11/05/20 55 3 kg (122 lb)     Temp Readings from Last 3 Encounters:   02/09/22 98 5 °F (36 9 °C) (Axillary)   02/04/22 (!) 97 3 °F (36 3 °C) (Oral)   11/05/20 (!) 96 5 °F (35 8 °C)     BP Readings from Last 3 Encounters:   02/09/22 167/80   02/04/22 161/74   11/05/20 116/68     Pulse Readings from Last 3 Encounters:   02/09/22 65   02/04/22 98   11/05/20 82        General:  Elderly, no acute distress at this time  Skin:  No acute rash  Eyes:  No scleral icterus and noninjected  ENT:   mucous membranes moist  Neck:, no jugular venous distention, no carotid bruits,   Chest:  Clear to auscultation percussion, good respiratory effort, no use of accessory respiratory muscles  CVS:  Regular rate and rhythm without a murmur rub   Abdomen:  soft and nontender   Extremities:  No lower extremity edema  Neuro:  No gross focality  Psych:  Alert, more awake and partially oriented  External catheter:  Dark yellow urine      Medications:    Current Facility-Administered Medications:     acetaminophen (TYLENOL) tablet 650 mg, 650 mg, Oral, 4x Daily, Tom Caballero DO, 650 mg at 02/09/22 0858    donepezil (ARICEPT) tablet 10 mg, 10 mg, Oral, HS, Barbara Kohli PA-C, 10 mg at 02/08/22 2211    heparin (porcine) subcutaneous injection 5,000 Units, 5,000 Units, Subcutaneous, Q8H [facility], Amelia Bae PA-C, 5,000 Units at 02/09/22 8704    hydrALAZINE (APRESOLINE) injection 5 mg, 5 mg, Intravenous, Q4H PRN, Amelia Bae PA-C, 5 mg at 02/04/22 2330    latanoprost (XALATAN) 0 005 % ophthalmic solution 1 drop, 1 drop, Both Eyes, HS, Amelia Bae PA-C, 1 drop at 02/08/22 2211    metoprolol succinate (TOPROL-XL) 24 hr tablet 25 mg, 25 mg, Oral, Daily, Amelia Bae PA-C, 25 mg at 02/09/22 0858    ondansetron (ZOFRAN) injection 4 mg, 4 mg, Intravenous, Q6H PRN, INA Quintanilla    oxyCODONE (ROXICODONE) IR tablet 2 5 mg, 2 5 mg, Oral, Q4H PRN, Amelia Bae PA-C    oxyCODONE (ROXICODONE) IR tablet 5 mg, 5 mg, Oral, Q4H PRN, Amelia Bae PA-C, 5 mg at 02/09/22 0616    pantoprazole (PROTONIX) EC tablet 40 mg, 40 mg, Oral, Early Morning, RENA ReddyC, 40 mg at 02/09/22 8603    QUEtiapine (SEROquel) tablet 25 mg, 25 mg, Oral, BID, Amelia Bae PA-C, 25 mg at 02/09/22 3245    sertraline (ZOLOFT) tablet 100 mg, 100 mg, Oral, Daily, Amelia Bae PA-C, 100 mg at 02/09/22 0858    timolol (TIMOPTIC) 0 5 % ophthalmic solution 1 drop, 1 drop, Both Eyes, BID, Amelia Bae PA-C, 1 drop at 02/09/22 1659    Laboratory Results:  Results from last 7 days   Lab Units 02/09/22  0626 02/08/22  0515 02/07/22  0546 02/06/22  0517 02/05/22  0530 02/04/22  1005   WBC Thousand/uL 7 57 9 32 12 30* 9 09 9 67 14 00*   HEMOGLOBIN g/dL 10 0* 9 0* 10 4* 10 4* 12 1 14 5   HEMATOCRIT % 31 8* 28 8* 33 0* 32 2* 36 0 42 8   PLATELETS Thousands/uL 248 211 204 195 178 221   SODIUM mmol/L 150* 144 140 136 138 137   POTASSIUM mmol/L 4 5 4 3 4 9 4 1 4 0 3 5   CHLORIDE mmol/L 115* 111* 107 105 105 101   CO2 mmol/L 24 23 22 25 25 21*   BUN mg/dL 74* 84* 75* 44* 32* 23*   CREATININE mg/dL 1 81* 2 35* 2 81* 1 79* 1 51* 1 36*   CALCIUM mg/dL 9 1 8 4 8 6 8 1* 9 1 9 9   MAGNESIUM mg/dL 2 6  --   --   --  2 0  --    PHOSPHORUS mg/dL 2 9  --   --   --   --   --        US kidney and bladder   Final Result by Jan Garcia MD (02/08 1631)      1  No hydronephrosis      2  Limited evaluation of the left kidney which may be atrophic versus foreshortening  3   Simple and minimally complex renal cysts  4   Cholelithiasis  Workstation performed: [id number]         XR hip/pelv 2-3 vws right if performed   Final Result by Tee Quesada MD (32/79 5031)      Fluoroscopic guidance provided for procedure guidance  Please refer to the separate procedure notes for additional details  Workstation performed: [id number]             Portions of the record may have been created with voice recognition software  Occasional wrong word or "sound a like" substitutions may have occurred due to the inherent limitations of voice recognition software  Read the chart carefully and recognize, using context, where substitutions have occurred

## 2022-02-09 NOTE — PROGRESS NOTES
Progress Note - Orthopedics   Denis Patton 79 y o  female MRN: [id number]  Unit/Bed#: 2 [place] [address] Encounter: [id number]        Subjective: Status post ORIF right intertrochanteric fx with IM nail performed 4 days ago  The patient notes pain about the right leg  Her daughter also notes she has had some swelling about her right knee  The patient notes good sensation of the right lower extremity  Hgb has been stable  Medicine and nephrology have been trying to optimize renal function prior to discharge to rehab  Vitals: Blood pressure 167/80, pulse 65, temperature 98 5 °F (36 9 °C), temperature source Axillary, resp  rate 16, height 5' 8" (1 727 m), weight 68 6 kg (151 lb 3 8 oz), SpO2 99 %  ,Body mass index is 23 kg/m²  Intake/Output Summary (Last 24 hours) at 2/9/2022 1301  Last data filed at 2/8/2022 2001  Gross per 24 hour   Intake --   Output 200 ml   Net -200 ml       Invasive Devices  Report    Peripheral Intravenous Line            Peripheral IV 02/06/22 Distal;Dorsal (posterior); Right Forearm 3 days                    Ortho Exam: Patient lying comfortable in bed  Dressings CDI  Mild swelling right thigh  Compartments soft  No calf tenderness  Moves toes/ankle freely  1+ DP pulse  Sensation intact lateral femoral cutaneous, saphenous, sural, deep/superficial peroneal nerve distributions  1+ effusion right knee  Patient refuses to move right knee on examination today  Lab, Imaging and other studies: I have personally reviewed pertinent lab results    CBC:   Lab Results   Component Value Date    WBC 7 57 02/09/2022    HGB 10 0 (L) 02/09/2022    HCT 31 8 (L) 02/09/2022    MCV 98 02/09/2022     02/09/2022    MCH 30 9 02/09/2022    MCHC 31 4 02/09/2022    RDW 14 6 02/09/2022    MPV 10 6 02/09/2022    NRBC 0 02/09/2022     CMP:   Lab Results   Component Value Date     (H) 02/09/2022    CO2 24 02/09/2022    BUN 74 (H) 02/09/2022    CREATININE 1 81 (H) 02/09/2022    CALCIUM 9 1 02/09/2022    AST 206 (H) 02/08/2022    ALT 24 02/08/2022    ALKPHOS 63 02/08/2022    EGFR 25 02/09/2022     PT/INR:   Lab Results   Component Value Date    INR 1 03 02/04/2022       Assessment:  Status post ORIF right femur fx with short IM nail  Plan:  PT/OT  WBAT  Analgesia prn  Lovenox for DVT prophylaxis  Dressing in place X 7 days post op  I did order xray of right knee today due to effusion  Discharge to rehab once medically stable  Weight bearing: WBAT with assistance      VTE Pharmacologic Prophylaxis: Enoxaparin (Lovenox)  VTE Mechanical Prophylaxis: sequential compression device

## 2022-02-09 NOTE — ASSESSMENT & PLAN NOTE
Baseline creatinine approximately 1 3-1 4 - currently improved @ 1 81 from a peak of 2 81 s/p low rate IV fluids (now discontinued)  Monitor renal function and urine output - avoid hypotension as possible - limit/avoid nephrotoxins as possible - continue to hold Lasix/Entresto  Nephrology following -> renal/bladder ultrasound negative for hydronephrosis  Anticipate discharge to skilled rehab once renal function stabilizes

## 2022-02-09 NOTE — QUICK NOTE
Quick note  02/09/2022  0854    Chart reviewed, patient's mentation and renal function slowly improving  Nephrology input appreciated  Will continue to hold Entresto at this time and continue to monitor  1  Acute intertrochanteric right femoral fracture:  POD#4 intertrochanteric nailing of right femoral fracture    -  care per Ortho and PT/OT     4  Acute kidney injury on Chronic kidney disease stage 3:  Baseline creatinine appears to be around 1 3, creatinine peaked at 2 81 on 2/7 and is slowly trending down and today it is 1 8     -  patient was noted to be hypotensive poor oral intake on postop day 1    -  holding Entresto at time will monitor blood pressure closely     3  Hypertension:  Postop hypotension   Will hold Entresto and continue monitor     4  Chronic combined systolic and diastolic heart failure:  Repeat limited echo pending to evaluate EF    -  EF on echo performed at [facility] in November 2021 was 60-65%    -  Holding Entresto due to DUARTE    -  will need close monitoring    -  continue Toprol XL 25 mg daily    -  monitor I&O, daily weights and labs     5  Alzheimer's dementia:  Disoriented this morning, most likely due to anesthesia effect    -  will need close monitoring every orientation      [facility]  Cardiology

## 2022-02-09 NOTE — SPEECH THERAPY NOTE
SLP Progress Note    Patient Name: Reji Churchill  NCLDK'Z Date: 2/9/2022     Problem List  Principal Problem:    Femoral neck fracture  Active Problems:    Alzheimer's dementia    Chronic combined systolic and diastolic CHF    Essential hypertension    Acute kidney injury superimposed on chronic kidney disease stage 3    Postoperative blood loss anemia    Leukocytosis    Hypernatremia    Subjective:  "Why is she on pureed food?" asked by daughter Erin Lund  Objective:  Pt was seen for diagnostic dysphagia therapy to ensure tolerance of least restrictive diet and to continue with pt and caregiver education  Pt was alert, upright with head supported by pillow  Pt was assessed with tsps of pureed fruit and NTL  Bolus formation and transfer were mod slowed but effective  Swallow initiation appeared mildly delayed Laryngeal rise was fair by palpation  No coughing, throat clearing, c/o stasis, multiple swallows, change in vocal quality noted c po intake today  Given 2 tsp of water, pt presented with cough on the 2nd sip  I met with dtr outside of room and reviewed impressions and recommendations from evaluation and f/u (relayed benefit of puree & NTL at this time) as well as plan for continued tx with expectation for diet progression over time given pt with no h/o dysphagia  Plan/Recommendations:  Continue current conservative diet (puree & NTL)  Ensure that neck is not hyperextended with intake (support head with pillows as needed)  Aspiration precautions and oral care after meals and medications  SLP to continue tx 3-5x weekly      Courtney Pay [address] [place] [address] N [id number]

## 2022-02-09 NOTE — ASSESSMENT & PLAN NOTE
S/p right femoral intertrochanteric nailing on 2/5 (POD #4)  PRN pain control  Continue postoperative PT/OT -> recommending skilled rehab once medically stable  Continue postoperative DVT prophylaxis for 4 weeks  See plan for blood-loss anemia below

## 2022-02-09 NOTE — PHYSICAL THERAPY NOTE
PT TREATMENT     02/09/22 1315   PT Last Visit   PT Visit Date 02/09/22   Note Type   Note Type Treatment   Pain Assessment   Pain Assessment Tool Corey-Baker FACES   Corey-Baker FACES Pain Rating 8   Pain Location/Orientation Orientation: Right;Location: Hip   Restrictions/Precautions   RLE Weight Bearing Per Order WBAT   Other Precautions Pain; Fall Risk;Bed Alarm; Chair Alarm;Cognitive   General   Chart Reviewed Yes   Family/Caregiver Present Yes  (Patient's daughter)   Subjective   Subjective Pt reports pain RLE   Bed Mobility   Supine to Sit 2  Maximal assistance   Additional items Assist x 2;Verbal cues   Transfers   Sit to Stand 2  Maximal assistance   Additional items Assist x 2;Verbal cues   Stand to Sit 2  Maximal assistance   Additional items Assist x 2;Verbal cues   Stand pivot 2  Maximal assistance   Additional items Assist x 2;Verbal cues   Additional Comments Pt also sit to stand with RW and max assist of 2, standing with RW for 30-45 seconds with max assist of 2  Pt unable to take steps   Balance   Static Sitting Fair   Static Standing Poor +   Dynamic Standing Poor   Activity Tolerance   Activity Tolerance Patient limited by fatigue;Patient limited by pain;Treatment limited secondary to medical complications (Comment)  (Cognition)   Exercises   Heelslides AAROM;10 reps;Bilateral   Hip Abduction AAROM;10 reps;Bilateral   Knee AROM Long Arc Quad AAROM;10 reps;Bilateral   Ankle Pumps AAROM;10 reps;Bilateral   Assessment   Assessment Pt noted to be more alert and awake today during PT session with improved tolerance to BLE exercises, bed mobility, transfers, and standing with a RW  Pt continues to need max assist of 2 for all functional mobility  The patient's AM-PAC Basic Mobility Inpatient Short Form Raw Score is 7  A Raw score of less than or equal to 16 suggests the patient may benefit from discharge to post-acute rehabilitation services   Please also refer to the recommendation of the Physical Therapist for safe discharge planning  Plan   Treatment/Interventions ADL retraining;Functional transfer training;LE strengthening/ROM; Therapeutic exercise; Endurance training;Cognitive reorientation;Patient/family training;Equipment eval/education; Bed mobility;Gait training; Compensatory technique education   PT Frequency Other (Comment)  (Daily)   Recommendation   PT Discharge Recommendation Home with home health rehabilitation   [address] Mobility Inpatient   Turning in Bed Without Bedrails 2   Lying on Back to Sitting on Edge of Flat Bed 1   Moving Bed to Chair 1   Standing Up From Chair 1   Walk in Room 1   Climb 3-5 Stairs 1   Basic Mobility Inpatient Raw Score 7   Turning Head Towards Sound 2   Follow Simple Instructions 2   Low Function Basic Mobility Raw Score 11   Low Function Basic Mobility Standardized Score 16 55   Highest Level Of Mobility   JH-HL Goal 2: Bed activities/Dependent transfer   JH-HLM Highest Level of Mobility 4: Move to chair/commode   JH-HLM Goal Achieved Yes   Education   Education Provided Mobility training;Assistive device   Patient Reinforcement needed   End of Consult   Patient Position at End of Consult Bedside chair;Bed/Chair alarm activated; All needs within reach   [facility] Insurance Number  Yoshi Morales PT [id number]     Portions of the documentation may have been created using voice recognition software  Occasional wrong word or sound alike substitutions may have occurred due to the inherent limitation of the voice recognition software  Read the chart carefully and recognize, using context, where substitutions have occurred

## 2022-02-09 NOTE — ASSESSMENT & PLAN NOTE
Likely in the setting of low intravascular in the setting of decreased oral free water intake and chronic diuretic use  Plan for a 500 mL bolus of D5W today   Continue serum sodium monitoring

## 2022-02-09 NOTE — ASSESSMENT & PLAN NOTE
Preoperative hemoglobin of 12 1 -> 10 4 -> 9 0 -> 10 0 today  Monitor H/H and transfuse if necessary

## 2022-02-09 NOTE — PROGRESS NOTES
Chavez Demarco  Progress Note - Malissa Lesches 1940, 79 y o  female MRN: [id number]  Unit/Bed#: [address] [place] [address] Encounter: [id number]  Primary Care Provider: Danie Velasquez MD   Date and time admitted to hospital: 2/4/2022  8:33 PM      * Femoral neck fracture  Assessment & Plan  S/p right femoral intertrochanteric nailing on 2/5 (POD #4)  PRN pain control  Continue postoperative PT/OT -> recommending skilled rehab once medically stable  Continue postoperative DVT prophylaxis for 4 weeks  See plan for blood-loss anemia below    Acute kidney injury superimposed on chronic kidney disease stage 3  Assessment & Plan  Baseline creatinine approximately 1 3-1 4 - currently improved @ 1 81 from a peak of 2 81 s/p low rate IV fluids (now discontinued)  Monitor renal function and urine output - avoid hypotension as possible - limit/avoid nephrotoxins as possible - continue to hold Lasix/Entresto  Nephrology following -> renal/bladder ultrasound negative for hydronephrosis  Anticipate discharge to skilled rehab once renal function stabilizes    Chronic combined systolic and diastolic CHF  Assessment & Plan  EF of 65% w/ mild biatrial dilatation,, mild-moderate TR, but no significant regional LV wall motion abnormalities  On beta-blockade with Toprol-XL - holding Entresto/Lasix in the setting of acute kidney injury - resume diuresis once renal function stabilized  Monitor/replete magnesium/potassium deficiencies if present    Alzheimer's dementia  Assessment & Plan  Continue Aricept/Seroquel/Zoloft    Hypernatremia  Assessment & Plan  Likely in the setting of low intravascular in the setting of decreased oral free water intake and chronic diuretic use  Plan for a 500 mL bolus of D5W today   Continue serum sodium monitoring    Postoperative blood loss anemia  Assessment & Plan  Preoperative hemoglobin of 12 1 -> 10 4 -> 9 0 -> 10 0 today  Monitor H/H and transfuse if necessary    Essential hypertension  Assessment & Plan  Continue Toprol-XL    Leukocytosis  Assessment & Plan  Likely reactive due to acute medical issue(s)  Monitor WBC count - normalized      DVT Prophylaxis:  Lovenox     Patient Centered Rounds:  I have performed bedside rounds and discussed plan of care with nursing today  Discussions with Specialists or Other Care Team Provider:  see above assessments if applicable    Education and Discussions with Family / Patient:  Patient at bedside - left voicemail with daughter, Viviana Parra, over the phone today    Time Spent for Care:  32 minutes  More than 50% of total time spent on counseling and coordination of care as described above  Current Length of Stay: 5 day(s)    Current Patient Status: Inpatient   Certification Statement:  Patient will continue to require additional hospital stay due to assessments as noted above  Code Status: Level 3 - DNAR and DNI        Subjective:     No new complaints this time  Resting bed at the time my encounter  Denies worsening pain at this time  Remains generally weak/fatigued  Objective:     Vitals:   Temp (24hrs), Av °F (36 7 °C), Min:97 6 °F (36 4 °C), Max:98 5 °F (36 9 °C)    Temp:  [97 6 °F (36 4 °C)-98 5 °F (36 9 °C)] 98 5 °F (36 9 °C)  HR:  [54-70] 65  Resp:  [16-18] 16  BP: (112-167)/(44-80) 167/80  SpO2:  [95 %-99 %] 99 %  Body mass index is 23 kg/m²  Input and Output Summary (last 24 hours):        Intake/Output Summary (Last 24 hours) at 2022 1222  Last data filed at 2022  Gross per 24 hour   Intake --   Output 200 ml   Net -200 ml       Physical Exam:     GENERAL:  Weak/fatigued - no immediate distress at rest  HEAD:  Normocephalic - atraumatic  EYES: PERRL - EOMI   MOUTH:  Mucosa moist  NECK:  Supple - full range of motion  CARDIAC:  Rate controlled - S1/S2 positive  PULMONARY:  Fairly clear to auscultation today - nonlabored respirations at rest  ABDOMEN:  Soft - nontender/nondistended - active bowel sounds  MUSCULOSKELETAL:  Motor strength/range of motion deconditioned s/p right hip intervention  NEUROLOGIC:  Baseline cognitive impairment present  SKIN:  Chronic wrinkles/blemishes   PSYCHIATRIC:  Mood/affect flat      Additional Data:     Labs & Recent Cultures:    Results from last 7 days   Lab Units 02/09/22  0626   WBC Thousand/uL 7 57   HEMOGLOBIN g/dL 10 0*   HEMATOCRIT % 31 8*   PLATELETS Thousands/uL 248   NEUTROS PCT % 83*   LYMPHS PCT % 8*   MONOS PCT % 7   EOS PCT % 1     Results from last 7 days   Lab Units 02/09/22  0626 02/08/22  0515 02/08/22  0515   POTASSIUM mmol/L 4 5   < > 4 3   CHLORIDE mmol/L 115*   < > 111*   CO2 mmol/L 24   < > 23   BUN mg/dL 74*   < > 84*   CREATININE mg/dL 1 81*   < > 2 35*   CALCIUM mg/dL 9 1   < > 8 4   ALK PHOS U/L  --   --  63   ALT U/L  --   --  24   AST U/L  --   --  206*    < > = values in this interval not displayed       Results from last 7 days   Lab Units 02/04/22  1004   INR  1 03     Results from last 7 days   Lab Units 02/06/22  1105 02/06/22  0650 02/05/22  2051 02/05/22  1539 02/05/22  0656   POC GLUCOSE mg/dl 133 120 116 121 105                         Last 24 Hours Medication List:   Current Facility-Administered Medications   Medication Dose Route Frequency Provider Last Rate    acetaminophen  650 mg Oral 4x Daily Tom Caballero DO      donepezil  10 mg Oral HS Ritta Sen, PA-C      heparin (porcine)  5,000 Units Subcutaneous [place], [place]      hydrALAZINE  5 mg Intravenous Q4H PRN Ritta Sen, PA-C      latanoprost  1 drop Both Eyes HS Ritta Sen, PA-C      metoprolol succinate  25 mg Oral Daily Ritta Sen, PA-C      ondansetron  4 mg Intravenous Q6H PRN VeneINA Lopez      oxyCODONE  2 5 mg Oral Q4H PRN Ritta Sen, PA-C      oxyCODONE  5 mg Oral Q4H PRN Ritta Sen, PA-C      pantoprazole  40 mg Oral Early Morning Ritta Sen, PA-C      QUEtiapine  25 mg Oral BID Ritta Sen, CRISTINO      sertraline  100 mg Oral Daily Kaylee Westbrook PA-C      timolol  1 drop Both Eyes BID Kaylee Westbrook PA-C                    ** Please Note: This note is constructed using a voice recognition dictation system  An occasional wrong word/phrase or sound-a-like substitution may have been picked up by dictation device due to the inherent limitations of voice recognition software  Read the chart carefully and recognize, using reasonable context, where substitutions may have occurred  **

## 2022-02-09 NOTE — PLAN OF CARE
Problem: OCCUPATIONAL THERAPY ADULT  Goal: Performs self-care activities at highest level of function for planned discharge setting  See evaluation for individualized goals  Description: Treatment Interventions: ADL retraining,Functional transfer training,UE strengthening/ROM,Endurance training,Patient/family training,Equipment evaluation/education,Cognitive reorientation,Activityengagement,Compensatory technique education          See flowsheet documentation for full assessment, interventions and recommendations  Outcome: Progressing  Note: Limitation: Decreased ADL status,Decreased UE strength,Decreased Safe judgement during ADL,Decreased UE ROM,Decreased cognition,Decreased endurance,Decreased high-level ADLs,Decreased self-care trans (decreased balance and mobility )  Prognosis: Good  Assessment: Patient seen for OT treatment  Patient requires max assist of 2 for transfers  Initially lethargic but improved once seated edge of bed  Patient then able to wash face with setup  Patients sitting balance improved to poor+ today  Patient will benefit from continued OT services to maximize functional performance with ADLS         OT Discharge Recommendation: Post acute rehabilitation services

## 2022-02-10 VITALS
SYSTOLIC BLOOD PRESSURE: 132 MMHG | RESPIRATION RATE: 18 BRPM | WEIGHT: 151.24 LBS | BODY MASS INDEX: 22.92 KG/M2 | OXYGEN SATURATION: 100 % | HEART RATE: 55 BPM | DIASTOLIC BLOOD PRESSURE: 59 MMHG | HEIGHT: 68 IN | TEMPERATURE: 97.5 F

## 2022-02-10 LAB
ANION GAP SERPL CALCULATED.3IONS-SCNC: 6 MMOL/L (ref 4–13)
BASOPHILS # BLD AUTO: 0.02 THOUSANDS/ΜL (ref 0–0.1)
BASOPHILS NFR BLD AUTO: 0 % (ref 0–1)
BUN SERPL-MCNC: 57 MG/DL (ref 5–25)
CALCIUM SERPL-MCNC: 8.5 MG/DL (ref 8.3–10.1)
CHLORIDE SERPL-SCNC: 117 MMOL/L (ref 100–108)
CO2 SERPL-SCNC: 26 MMOL/L (ref 21–32)
CREAT SERPL-MCNC: 1.52 MG/DL (ref 0.6–1.3)
EOSINOPHIL # BLD AUTO: 0.08 THOUSAND/ΜL (ref 0–0.61)
EOSINOPHIL NFR BLD AUTO: 1 % (ref 0–6)
ERYTHROCYTE [DISTWIDTH] IN BLOOD BY AUTOMATED COUNT: 14.6 % (ref 11.6–15.1)
GFR SERPL CREATININE-BSD FRML MDRD: 31 ML/MIN/1.73SQ M
GLUCOSE SERPL-MCNC: 92 MG/DL (ref 65–140)
HCT VFR BLD AUTO: 27.7 % (ref 34.8–46.1)
HGB BLD-MCNC: 8.8 G/DL (ref 11.5–15.4)
IMM GRANULOCYTES # BLD AUTO: 0.07 THOUSAND/UL (ref 0–0.2)
IMM GRANULOCYTES NFR BLD AUTO: 1 % (ref 0–2)
LYMPHOCYTES # BLD AUTO: 0.61 THOUSANDS/ΜL (ref 0.6–4.47)
LYMPHOCYTES NFR BLD AUTO: 10 % (ref 14–44)
MAGNESIUM SERPL-MCNC: 2.4 MG/DL (ref 1.6–2.6)
MCH RBC QN AUTO: 31.5 PG (ref 26.8–34.3)
MCHC RBC AUTO-ENTMCNC: 31.8 G/DL (ref 31.4–37.4)
MCV RBC AUTO: 99 FL (ref 82–98)
MONOCYTES # BLD AUTO: 0.49 THOUSAND/ΜL (ref 0.17–1.22)
MONOCYTES NFR BLD AUTO: 8 % (ref 4–12)
NEUTROPHILS # BLD AUTO: 4.71 THOUSANDS/ΜL (ref 1.85–7.62)
NEUTS SEG NFR BLD AUTO: 80 % (ref 43–75)
NRBC BLD AUTO-RTO: 0 /100 WBCS
PHOSPHATE SERPL-MCNC: 2.6 MG/DL (ref 2.3–4.1)
PLATELET # BLD AUTO: 232 THOUSANDS/UL (ref 149–390)
PMV BLD AUTO: 10.2 FL (ref 8.9–12.7)
POTASSIUM SERPL-SCNC: 4.4 MMOL/L (ref 3.5–5.3)
RBC # BLD AUTO: 2.79 MILLION/UL (ref 3.81–5.12)
SODIUM SERPL-SCNC: 149 MMOL/L (ref 136–145)
WBC # BLD AUTO: 5.98 THOUSAND/UL (ref 4.31–10.16)

## 2022-02-10 PROCEDURE — 85025 COMPLETE CBC W/AUTO DIFF WBC: CPT | Performed by: INTERNAL MEDICINE

## 2022-02-10 PROCEDURE — 92526 ORAL FUNCTION THERAPY: CPT

## 2022-02-10 PROCEDURE — 99024 POSTOP FOLLOW-UP VISIT: CPT | Performed by: ORTHOPAEDIC SURGERY

## 2022-02-10 PROCEDURE — 80048 BASIC METABOLIC PNL TOTAL CA: CPT | Performed by: INTERNAL MEDICINE

## 2022-02-10 PROCEDURE — 99232 SBSQ HOSP IP/OBS MODERATE 35: CPT | Performed by: INTERNAL MEDICINE

## 2022-02-10 PROCEDURE — 99239 HOSP IP/OBS DSCHRG MGMT >30: CPT | Performed by: INTERNAL MEDICINE

## 2022-02-10 PROCEDURE — 84100 ASSAY OF PHOSPHORUS: CPT | Performed by: INTERNAL MEDICINE

## 2022-02-10 PROCEDURE — 99232 SBSQ HOSP IP/OBS MODERATE 35: CPT | Performed by: STUDENT IN AN ORGANIZED HEALTH CARE EDUCATION/TRAINING PROGRAM

## 2022-02-10 PROCEDURE — 83735 ASSAY OF MAGNESIUM: CPT | Performed by: INTERNAL MEDICINE

## 2022-02-10 RX ORDER — NIFEDIPINE 30 MG/1
30 TABLET, EXTENDED RELEASE ORAL DAILY
Status: DISCONTINUED | OUTPATIENT
Start: 2022-02-10 | End: 2022-02-10 | Stop reason: HOSPADM

## 2022-02-10 RX ORDER — NIFEDIPINE 30 MG/1
30 TABLET, EXTENDED RELEASE ORAL DAILY
Refills: 0
Start: 2022-02-11

## 2022-02-10 RX ORDER — SACUBITRIL AND VALSARTAN 24; 26 MG/1; MG/1
1 TABLET, FILM COATED ORAL 2 TIMES DAILY
Refills: 0
Start: 2022-02-17

## 2022-02-10 RX ORDER — OXYCODONE HYDROCHLORIDE 5 MG/1
2.5 TABLET ORAL EVERY 8 HOURS PRN
Qty: 15 TABLET | Refills: 0 | Status: SHIPPED | OUTPATIENT
Start: 2022-02-10

## 2022-02-10 RX ADMIN — TIMOLOL MALEATE 1 DROP: 5 SOLUTION/ DROPS OPHTHALMIC at 09:46

## 2022-02-10 RX ADMIN — ENOXAPARIN SODIUM 30 MG: 30 INJECTION SUBCUTANEOUS at 09:40

## 2022-02-10 RX ADMIN — ACETAMINOPHEN 650 MG: 325 TABLET, FILM COATED ORAL at 09:40

## 2022-02-10 RX ADMIN — SERTRALINE HYDROCHLORIDE 100 MG: 100 TABLET ORAL at 09:40

## 2022-02-10 RX ADMIN — PANTOPRAZOLE SODIUM 40 MG: 40 TABLET, DELAYED RELEASE ORAL at 05:28

## 2022-02-10 RX ADMIN — QUETIAPINE FUMARATE 25 MG: 25 TABLET ORAL at 09:40

## 2022-02-10 RX ADMIN — METOPROLOL SUCCINATE 25 MG: 25 TABLET, EXTENDED RELEASE ORAL at 09:40

## 2022-02-10 RX ADMIN — SACUBITRIL AND VALSARTAN 1 TABLET: 24; 26 TABLET, FILM COATED ORAL at 12:00

## 2022-02-10 RX ADMIN — NIFEDIPINE 30 MG: 30 TABLET, EXTENDED RELEASE ORAL at 10:09

## 2022-02-10 RX ADMIN — DEXTROSE 500 ML: 5 SOLUTION INTRAVENOUS at 07:30

## 2022-02-10 RX ADMIN — ACETAMINOPHEN 650 MG: 325 TABLET, FILM COATED ORAL at 12:00

## 2022-02-10 NOTE — CASE MANAGEMENT
Case Management Discharge Planning Note    Patient name Sanya Lindsey  Location [facility] [address]/ [place] [address] MRN [id number]  : 1940 Date 2/10/2022       Current Admission Date: 2022  Current Admission Diagnosis:Femoral neck fracture   Patient Active Problem List    Diagnosis Date Noted    Hypernatremia 2022    Leukocytosis 2022    Acute kidney injury superimposed on chronic kidney disease stage 3 2022    Postoperative blood loss anemia 2022    Femoral neck fracture 2022    Chronic combined systolic and diastolic CHF     Stage 3a chronic kidney disease ([facility] [address] ) 2022    Essential hypertension 2022    Back pain     Medicare annual wellness visit, subsequent 2020    Alzheimer's dementia 2020      LOS (days): 6  Geometric Mean LOS (GMLOS) (days): 4 30  Days to GMLOS:-1 3     OBJECTIVE:  Risk of Unplanned Readmission Score: 22     Current admission status: Inpatient   Preferred Pharmacy:   [address] [place] [address]  [place] [address] [place] [address]  Phone: [phone] Fax: [phone]    Primary Care Provider: Elisabeth Lewis MD    Primary Insurance: [address] [place] [address]  Secondary Insurance:     DISCHARGE DETAILS:    Discharge planning discussed with[de-identified] Ronaldo Tsai  [place] of Choice: Yes  Comments - [place] of Choice: SW following to assist with DCP  STR placement is planned  Pt has been accepted by [facility] and bed available  [address] [place] [address] responded that they have no beds available  SW reviewed with pt's daughter  Daughter is requesting transfer to [facility] when discharged  Notified Dr Cassius Garzon of plan  SW will follow to assist with transfer when ready    CM contacted family/caregiver?: Yes    Contacts  Patient Contacts: Marlena Mary  Relationship to Patient[de-identified] Family  Contact Method: Phone  Phone Number: [phone]  Reason/Outcome: Discharge Planning    Treatment Team Recommendation: Short Term Rehab  Discharge Destination Plan[de-identified] Short Term Rehab  Transport at Discharge : BLS Ambulance    IMM Given (Date)::  (daughter will be in at noon to review and sign)

## 2022-02-10 NOTE — CASE MANAGEMENT
Case Management Discharge Planning Note    Patient name Doe Andersen  Location 18 [facility] 207[address] [address] MRN [id number]  : 1940 Date 2/10/2022       Current Admission Date: 2022  Current Admission Diagnosis:Femoral neck fracture   Patient Active Problem List    Diagnosis Date Noted    Hypernatremia 2022    Leukocytosis 2022    Acute kidney injury superimposed on chronic kidney disease stage 3 2022    Postoperative blood loss anemia 2022    Femoral neck fracture 2022    Chronic combined systolic and diastolic CHF     Stage 3a chronic kidney disease ([facility] [address] ) 2022    Essential hypertension 2022    Back pain     Medicare annual wellness visit, subsequent 2020    Alzheimer's dementia 2020      LOS (days): 6  Geometric Mean LOS (GMLOS) (days): 4 30  Days to GMLOS:-1 5     OBJECTIVE:  Risk of Unplanned Readmission Score: 23         Current admission status: Inpatient   Preferred Pharmacy:   [address] [place] [address], [address] [facility]  3 [facility]  PEN [address] [address]  Phone: [phone] Fax: [phone]    Primary Care Provider: Isabelle Montgomery MD    Primary Insurance: [address] [place] [address],[address] [facility]  Secondary Insurance:      [address] Number: Call compete to [address] [place] [address] Dr  to change SNF for approved auth # [id number]; New SNF: [facility] NPI: [id number] Dr Sunitha Arita: [id number]; approved x3 buisness days SOC: 2/10/2022 NRD: 2/15/2022 Fax clinical review to F) [phone]; Transportation information given to [place],  auth required

## 2022-02-10 NOTE — PROGRESS NOTES
Progress Note - Orthopedics   Marta Peeling 79 y o  female MRN: [id number]  Unit/Bed#: [address] [place] [address] Encounter: [id number]    Assessment:  Postoperative day 5  Status post right short trochanteric femoral nail    Plan:  Juan Jose is doing much better medically  She can weightbear as tolerated  She will have anticoagulation 4 weeks postoperatively  The dressing should be removed on postoperative day 7  I should see her back in the office next week for repeat x-ray and repeat clinical evaluation and wound check  Weight bearing:  As tolerated      Subjective:  Juan Jose states that she has minimal to no discomfort about her right hip  Her daughter is with her today  She has been up with physical therapy and is currently sitting upright in the chair resting comfortably  Vitals: Blood pressure 153/54, pulse 66, temperature 98 4 °F (36 9 °C), temperature source Axillary, resp  rate 18, height 5' 8" (1 727 m), weight 68 6 kg (151 lb 3 8 oz), SpO2 96 %  ,Body mass index is 23 kg/m²  Intake/Output Summary (Last 24 hours) at 2/10/2022 1527  Last data filed at 2/10/2022 1001  Gross per 24 hour   Intake 45 ml   Output 250 ml   Net -205 ml       Invasive Devices  Report    Peripheral Intravenous Line            Peripheral IV 02/06/22 Distal;Dorsal (posterior); Right Forearm 4 days                Physical Exam:   Ortho Exam: Hip  Right hip dressing clean and dry and intact  Sensory motor function intact right lower extremity throughout  Minimal swelling right thigh  No signs of infection      Lab, Imaging and other studies:   CBC:   Lab Results   Component Value Date    WBC 5 98 02/10/2022    HGB 8 8 (L) 02/10/2022    HCT 27 7 (L) 02/10/2022    MCV 99 (H) 02/10/2022     02/10/2022    MCH 31 5 02/10/2022    MCHC 31 8 02/10/2022    RDW 14 6 02/10/2022    MPV 10 2 02/10/2022    NRBC 0 02/10/2022

## 2022-02-10 NOTE — CASE MANAGEMENT
Case Management Discharge Planning Note    Patient name Susan Landrum  Location 18 [facility] 207/2 [address] 200 MRN [id number]  : 1940 Date 2/10/2022       Current Admission Date: 2022  Current Admission Diagnosis:Femoral neck fracture   Patient Active Problem List    Diagnosis Date Noted    Hypernatremia 2022    Leukocytosis 2022    Acute kidney injury superimposed on chronic kidney disease stage 3 2022    Postoperative blood loss anemia 2022    Femoral neck fracture 2022    Chronic combined systolic and diastolic CHF     Stage 3a chronic kidney disease ([facility] [address] ) 2022    Essential hypertension 2022    Back pain     Medicare annual wellness visit, subsequent 2020    Alzheimer's dementia 2020      LOS (days): 6  Geometric Mean LOS (GMLOS) (days): 4 30  Days to GMLOS:-1 3     OBJECTIVE:  Risk of Unplanned Readmission Score: 22         Current admission status: Inpatient   Preferred Pharmacy:   [address] [place] [address]  Phone: [phone] Fax: [phone]    Primary Care Provider: Mariana Zarate MD    Primary Insurance: [facility]:     [address] [place] [address] Rd Number: Approved [facility] ref# [id number] for [address] (NPI: [id number]) Dr Gregory Flores (NPI: [id number]) approved x3 buisness days SOC: 2/10/2022 NRD: 2/15/2022 Fax clinical review to F) [phone]; Transportation information given to [place],  auth required

## 2022-02-10 NOTE — PLAN OF CARE
Problem: Nutrition/Hydration-ADULT  Goal: Nutrient/Hydration intake appropriate for improving, restoring or maintaining nutritional needs  Description: Monitor and assess patient's nutrition/hydration status for malnutrition  Collaborate with interdisciplinary team and initiate plan and interventions as ordered  Monitor patient's weight and dietary intake as ordered or per policy  Utilize nutrition screening tool and intervene as necessary  Determine patient's food preferences and provide high-protein, high-caloric foods as appropriate       INTERVENTIONS:  - Monitor oral intake, urinary output, labs, and treatment plans  - Assess nutrition and hydration status and recommend course of action  - Evaluate amount of meals eaten  - Assist patient with eating if necessary   - Allow adequate time for meals  - Recommend/ encourage appropriate diets, oral nutritional supplements, and vitamin/mineral supplements  - Order, calculate, and assess calorie counts as needed  - Recommend, monitor, and adjust tube feedings and TPN/PPN based on assessed needs  - Assess need for intravenous fluids  - Provide specific nutrition/hydration education as appropriate  - Include patient/family/caregiver in decisions related to nutrition  2/10/2022 1639 by Lizz Collier RN  Outcome: Adequate for Discharge  2/10/2022 1042 by Lizz Collier RN  Outcome: Progressing     Problem: MUSCULOSKELETAL - ADULT  Goal: Maintain or return mobility to safest level of function  Description: INTERVENTIONS:  - Assess patient's ability to carry out ADLs; assess patient's baseline for ADL function and identify physical deficits which impact ability to perform ADLs (bathing, care of mouth/teeth, toileting, grooming, dressing, etc )  - Assess/evaluate cause of self-care deficits   - Assess range of motion  - Assess patient's mobility  - Assess patient's need for assistive devices and provide as appropriate  - Encourage maximum independence but intervene and supervise when necessary  - Involve family in performance of ADLs  - Assess for home care needs following discharge   - Consider OT consult to assist with ADL evaluation and planning for discharge  - Provide patient education as appropriate  2/10/2022 1639 by Corie Prince RN  Outcome: Adequate for Discharge  2/10/2022 1042 by Corie Prince RN  Outcome: Progressing  Goal: Maintain proper alignment of affected body part  Description: INTERVENTIONS:  - Support, maintain and protect limb and body alignment  - Provide patient/ family with appropriate education  2/10/2022 1639 by Corie Prince RN  Outcome: Adequate for Discharge  2/10/2022 1042 by Corie Prince RN  Outcome: Progressing     Problem: MOBILITY - ADULT  Goal: Maintain or return to baseline ADL function  Description: INTERVENTIONS:  -  Assess patient's ability to carry out ADLs; assess patient's baseline for ADL function and identify physical deficits which impact ability to perform ADLs (bathing, care of mouth/teeth, toileting, grooming, dressing, etc )  - Assess/evaluate cause of self-care deficits   - Assess range of motion  - Assess patient's mobility; develop plan if impaired  - Assess patient's need for assistive devices and provide as appropriate  - Encourage maximum independence but intervene and supervise when necessary  - Involve family in performance of ADLs  - Assess for home care needs following discharge   - Consider OT consult to assist with ADL evaluation and planning for discharge  - Provide patient education as appropriate  2/10/2022 1639 by Corie Prince RN  Outcome: Adequate for Discharge  2/10/2022 1042 by Corie Prince RN  Outcome: Progressing     Problem: Potential for Falls  Goal: Patient will remain free of falls  Description: INTERVENTIONS:  - Educate patient/family on patient safety including physical limitations  - Instruct patient to call for assistance with activity   - Consult OT/PT to assist with strengthening/mobility   - Keep Call bell within reach  - Keep bed low and locked with side rails adjusted as appropriate  - Keep care items and personal belongings within reach  - Initiate and maintain comfort rounds  - Make Fall Risk Sign visible to staff  - Offer Toileting every 2 Hours, in advance of need  - Initiate/Maintain bed alarm  - Apply yellow socks and bracelet for high fall risk patients  - Consider moving patient to room near nurses station  2/10/2022 1639 by Raul Luque RN  Outcome: Adequate for Discharge  2/10/2022 1042 by Raul Luque RN  Outcome: Progressing     Problem: Prexisting or High Potential for Compromised Skin Integrity  Goal: Skin integrity is maintained or improved  Description: INTERVENTIONS:  - Identify patients at risk for skin breakdown  - Assess and monitor skin integrity  - Assess and monitor nutrition and hydration status  - Monitor labs   - Assess for incontinence   - Turn and reposition patient  - Assist with mobility/ambulation  - Relieve pressure over bony prominences  - Avoid friction and shearing  - Provide appropriate hygiene as needed including keeping skin clean and dry  - Evaluate need for skin moisturizer/barrier cream  - Collaborate with interdisciplinary team   - Patient/family teaching  - Consider wound care consult   2/10/2022 1639 by Raul Luque RN  Outcome: Adequate for Discharge  2/10/2022 1042 by Raul Luque RN  Outcome: Progressing

## 2022-02-10 NOTE — NURSING NOTE
Discharge accompanied by 2 transport team and daughter, Iv removed per protocol dressing applied Masimo removed

## 2022-02-10 NOTE — CASE MANAGEMENT
Case Management Discharge Planning Note    Patient name Pro Whitaker  Location 18 [facility] 207/2 [address] 200 MRN [id number]  : 1940 Date 2/10/2022       Current Admission Date: 2022  Current Admission Diagnosis:Femoral neck fracture   Patient Active Problem List    Diagnosis Date Noted    Hypernatremia 2022    Leukocytosis 2022    Acute kidney injury superimposed on chronic kidney disease stage 3 2022    Postoperative blood loss anemia 2022    Femoral neck fracture 2022    Chronic combined systolic and diastolic CHF     Stage 3a chronic kidney disease ([facility] [address] ) 2022    Essential hypertension 2022    Back pain     Medicare annual wellness visit, subsequent 2020    Alzheimer's dementia 2020      LOS (days): 6  Geometric Mean LOS (GMLOS) (days): 4 30  Days to GMLOS:-1 4     OBJECTIVE:  Risk of Unplanned Readmission Score: 23     Current admission status: Inpatient   Preferred Pharmacy:   [address] [place] [address]  Phone: [phone] Fax: [phone]    Primary Care Provider: Sofia Torres MD    Primary Insurance: [address] [place] [address],[address] [place] [address]  Secondary Insurance:     DISCHARGE DETAILS:    Discharge planning discussed with[de-identified] Paloma Dear, daughter  Freedom of Choice: Yes  Comments - Freedom of Choice: SW following to assist with DCP  STR placement planned  Responses received from new referrals  Pt has been accepted by [facility]  SW discussed acceptances with daughter to determine her choice  Daughter chose to have pt tranferred to [facility] MCKENZIE contacted family/caregiver?: Yes    Contacts  Patient Contacts: Clent Dear  Relationship to Patient[de-identified] Family  Contact Method:  In Person  Reason/Outcome: Discharge Planning    Other Referral/Resources/Interventions Provided:  Interventions: Short Term Rehab  Referral Comments: STR placement planned at [facility]  Second task request sent to Ryann Enrique Discharge Support to submit for authorization      Treatment Team Recommendation: Short Term Rehab  Discharge Destination Plan[de-identified] Short Term Rehab  Transport at Discharge : [facility]  Dispatcher Contacted: Yes  Number/Name of Dispatcher: [facility]  Transported by [employer] and Unit #):  (pending)  ETA of Transport (Date): 02/10/22  ETA of Transport (Time):  (pending)

## 2022-02-10 NOTE — CASE MANAGEMENT
Case Management Progress Note    Patient name Nohelia Smith  Location 18 [facility] [address] MRN [id number]  : 1940 Date 2/10/2022       LOS (days): 6  Geometric Mean LOS (GMLOS) (days): 4 30  Days to GMLOS:-1 3        OBJECTIVE:        Current admission status: Inpatient  Preferred Pharmacy:   [address] [place] [address]  Phone: [phone] Fax: [phone]    Primary Care Provider: Jessica Carlin MD    Primary Insurance: [facility]  Secondary Insurance:     PROGRESS NOTE:    Call received from [facility] from [facility]  After checking benefits Cov found that pt's [employer] only covers 60% of out of network facilities (which [facility] is)  Tal will call pt's daughter to discuss above  SW will follow up with daughter to determine additional choices

## 2022-02-10 NOTE — CASE MANAGEMENT
Case Management Progress Note    Patient name Mehdi Acosta  Location 18 [facility]  Sandi Schuster MRN [id number]  : 1940 Date 2/10/2022       LOS (days): 6  Geometric Mean LOS (GMLOS) (days): 4 30  Days to GMLOS:-1 3        OBJECTIVE:    Current admission status: Inpatient  Preferred Pharmacy:   [address] [place] [address]  Phone: [phone] Fax: [phone]    Primary Care Provider: Jayme Walsh MD    Primary Insurance: [facility]  Secondary Insurance:     PROGRESS NOTE:    Task requests sent to [facility] Lynette Vences Discharge Support Team to initiate authorization for skilled rehab and BLS transportation

## 2022-02-10 NOTE — SPEECH THERAPY NOTE
Speech Language/Pathology    Speech/Language Pathology Progress Note    Patient Name: Bunny Silverman  [id number] Date: 2/10/2022     Subjective:  Patients daughter present who denies any prior history of dysphagia  She reports that the patient does have dentures but she was afraid that if she brought them in that they might get lost  MAYLIN Lopez reports improvement today in overall status- she is no longer demonstrating lingual thrust- able to hold head upright etc     Objective:  Patient seen upright in recliner chair  She consumed applesauce, nunu crackers moistened in applesauce and nunu crackers dry as well as thin and nectar thickened liquids  Straw suck and labial seal on cup edge/spoon were adequate  Mastication was slow/milldy reduced but eventually effective  Bolus formation and transfer were slow but effective with no retention noted  Swallow initiation was was miniamlly delayed with minimally reduced rise  Cough response was noted in approx 50% of opps with thin liquids  No coughing was noted across thick liquids, puree or soft solids  Patient with fatigue noted throughout trials  She reported ongoing fatigue and was unable to state why exclaiming "I slept well last night "    Discussed current diet and recommendations with the patient and her daughter as well as aspiration sxs, risks and precautions  We also discussed f/u with SLP at next level of care as patient is pending d/c to [facility] today  Reciprocal comprehension was verbally expressed,     Assessment:  Patients swallow function appears to be improving although continues to present with aspiration risk- she did not present with hyperextension of the neck today      Plan/Recommendations:  Consider upgrade to dysphagia 2- continue nectar thick liquids  meds crushed in puree    Aspiration precautions and compensatory swallowing strategies: upright posture, only feed when fully alert, slow rate of feeding, small bites/sips and alternating bites and sips    Will f/u if patient remains in the hospital    RUFINA Hampton , [address] [facility], [place], [facility]  Speech Language Pathologist   Available via [address] [place] [address] #[id number]  [place] #[id number]

## 2022-02-10 NOTE — PROGRESS NOTES
NEPHROLOGY PROGRESS NOTE   Shannon Zacarias 79 y o  female MRN: [id number]  Unit/Bed#: 207 Jessica Hernandez Encounter: [id number]  Reason for Consult: DUARTE    ASSESSMENT AND PLAN:  79 y  o  woman PMH of CKD G3b (baseline creatinine 1 3-1 4 mg/dL), HTB, CHF, Alzheimer's dementia p/s right femoral fracture s/p intertrochanteric nailing   Nephrology is consulted for DUARTE     PLAN     #Non-Oliguric KDIGO DUARTE stage 1 on CKD G3b with evidence of kidney recovery  · Etiology:  likely secondary to hemodynamic changes in the settings of hypotension ,  Entresto and poor oral intake  · Baseline creatinine 1 3-1 4 mg/dL  · Peak creatinine:  2 81 milligrams/deciliter  · Current creatinine:  1 5 mg/dL, baseline  · Renal imaging :     · left atrophic kidney, bilateral CTs, no hydronephrosis  · Treatment:  · No indication of dialysis at this time  · Maintain MAP:  Over 65 mmHg if possible/avoid hypoperfusion:  Hold parameters on blood pressure medications  · Avoid nephrotoxic agents such as NSAIDs, and IV contrast if possible   Avoid opioids   · Adjust medications to GFR  · Entresto can be restarted on discharge if needed  · Enforce fluid intake  ·    #CKD G3b  · Baseline creatinine:  1 3-1 4 mg/dL  · Etiology:  Likely secondary to nephroangiosclerosis secondary to hypertension and nephron loss due to aging      # hypernatremia  · Serum sodium 149 mEq per L, resolving  · Etiology:  Secondary to sodium load and poor free water intake  · Recommend:  · D5W 500 cc given again this morning    · Monitor BMP Q 24  · Enforce free water intake     #Acid-base Disorder  · serum HCO3 26mmol/l, goal>21  · At goal  · A     #Volume status/hypertension:  · Volume:  Euvolemic  · Blood pressure:     hypertensive this morning /68mmHg, goal <140/90mmhg   · Recommend:  · Would wait at least 1 week to restart Entresto  · Start nifedipine 30 mg  · Low-sodium diet  · Enforce fluid intake     #Anemia:  · Current hemoglobin:  8 8 mg/dL, trending down  · Treatment:  · Transfuse for hemoglobin less than 7 0 per primary service       # femoral fracture  · S/p intervention  · Management as per primary team    Kidney function is not baseline, nephrology will sign off at this time  Thank you for involving us in this case  Please call us if any question  SUBJECTIVE:  Patient seen and examined at bedside  Patient feels thirsty, no water available in her room, had some oranges  No chest pain, shortness of breath, nausea, vomiting, abdominal pain or diarrhea        OBJECTIVE:  Current Weight: Weight - Scale: 68 6 kg (151 lb 3 8 oz)  Vitals:    02/10/22 0810   BP: (!) 178/68   Pulse: 66   Resp: 18   Temp: 98 4 °F (36 9 °C)   SpO2: 98%       Intake/Output Summary (Last 24 hours) at 2/10/2022 0947  Last data filed at 2/10/2022 0401  Gross per 24 hour   Intake 35 ml   Output 250 ml   Net -215 ml     Wt Readings from Last 3 Encounters:   02/09/22 68 6 kg (151 lb 3 8 oz)   02/04/22 61 2 kg (135 lb)   11/05/20 55 3 kg (122 lb)     Temp Readings from Last 3 Encounters:   02/10/22 98 4 °F (36 9 °C) (Axillary)   02/04/22 (!) 97 3 °F (36 3 °C) (Oral)   11/05/20 (!) 96 5 °F (35 8 °C)     BP Readings from Last 3 Encounters:   02/10/22 (!) 178/68   02/04/22 161/74   11/05/20 116/68     Pulse Readings from Last 3 Encounters:   02/10/22 66   02/04/22 98   11/05/20 82        General:  Elderly, no acute distress at this time  Skin:  No acute rash  Eyes:  No scleral icterus and noninjected  ENT:  mucous membranes moist  Neck:  Supple, no jugular venous distention,  Chest:  Clear to auscultation percussion, good respiratory effort, no use of accessory respiratory muscles  CVS:  Regular rate and rhythm without a murmur rub  Abdomen:  soft and nontender   Extremities:  No lower extremity edema  Neuro:  No gross focality  Psych:  Alert , cooperative    Medications:    Current Facility-Administered Medications:     acetaminophen (TYLENOL) tablet 650 mg, 650 mg, Oral, 4x Daily, Lulú Solis, , 650 mg at 02/10/22 0940    donepezil (ARICEPT) tablet 10 mg, 10 mg, Oral, HS, BILL Caldwell-C, 10 mg at 02/09/22 2207    enoxaparin (LOVENOX) subcutaneous injection 30 mg, 30 mg, Subcutaneous, Q24H [address], Purnima Bryan MD, 30 mg at 02/10/22 0940    hydrALAZINE (APRESOLINE) injection 5 mg, 5 mg, Intravenous, Q4H PRN, BILL Caldwell-C, 5 mg at 02/04/22 2330    latanoprost (XALATAN) 0 005 % ophthalmic solution 1 drop, 1 drop, Both Eyes, HS, Lyle Lorenzana PA-C, 1 drop at 02/09/22 2207    metoprolol succinate (TOPROL-XL) 24 hr tablet 25 mg, 25 mg, Oral, Daily, BILL Caldwell-C, 25 mg at 02/10/22 0940    ondansetron (ZOFRAN) injection 4 mg, 4 mg, Intravenous, Q6H PRN, INA Romano    oxyCODONE (ROXICODONE) IR tablet 2 5 mg, 2 5 mg, Oral, Q4H PRN, BILL Caldwell-C    oxyCODONE (ROXICODONE) IR tablet 5 mg, 5 mg, Oral, Q4H PRN, BILL Caldwell-C, 5 mg at 02/09/22 1208    pantoprazole (PROTONIX) EC tablet 40 mg, 40 mg, Oral, Early Morning, Lyle Lorenzana PA-C, 40 mg at 02/10/22 1920    QUEtiapine (SEROquel) tablet 25 mg, 25 mg, Oral, BID, BILL Caldwell-C, 25 mg at 02/10/22 0940    sertraline (ZOLOFT) tablet 100 mg, 100 mg, Oral, Daily, BILL Caldwell-C, 100 mg at 02/10/22 0940    timolol (TIMOPTIC) 0 5 % ophthalmic solution 1 drop, 1 drop, Both Eyes, BID, Lyle Lorenzana PA-C, 1 drop at 02/09/22 1734    Laboratory Results:  Results from last 7 days   Lab Units 02/10/22  0534 02/09/22  0626 02/08/22  0515 02/07/22  0546 02/06/22  0517 02/05/22  0530 02/04/22  1005   WBC Thousand/uL 5 98 7 57 9 32 12 30* 9 09 9 67 14 00*   HEMOGLOBIN g/dL 8 8* 10 0* 9 0* 10 4* 10 4* 12 1 14 5   HEMATOCRIT % 27 7* 31 8* 28 8* 33 0* 32 2* 36 0 42 8   PLATELETS Thousands/uL 232 248 211 204 195 178 221   SODIUM mmol/L 149* 150* 144 140 136 138 137   POTASSIUM mmol/L 4 4 4 5 4 3 4 9 4 1 4 0 3 5   CHLORIDE mmol/L 117* 115* 111* 107 105 105 101   CO2 mmol/L 26 24 23 22 25 25 21*   BUN mg/dL 57* 74* 84* 75* 44* 32* 23*   CREATININE mg/dL 1 52* 1 81* 2 35* 2 81* 1 79* 1 51* 1 36*   CALCIUM mg/dL 8 5 9 1 8 4 8 6 8 1* 9 1 9 9   MAGNESIUM mg/dL 2 4 2 6  --   --   --  2 0  --    PHOSPHORUS mg/dL 2 6 2 9  --   --   --   --   --        XR knee 1 or 2 vw right   Final Result by Steven Gramajo MD (02/09 1627)      No acute osseous abnormality  Workstation performed: [id number]         US kidney and bladder   Final Result by Sheng Meléndez MD (02/08 1631)      1  No hydronephrosis      2  Limited evaluation of the left kidney which may be atrophic versus foreshortening  3   Simple and minimally complex renal cysts  4   Cholelithiasis  Workstation performed: [id number]         XR hip/pelv 2-3 vws right if performed   Final Result by Oneil Bond MD (07/78 1485)      Fluoroscopic guidance provided for procedure guidance  Please refer to the separate procedure notes for additional details  Workstation performed: [id number]             Portions of the record may have been created with voice recognition software  Occasional wrong word or "sound a like" substitutions may have occurred due to the inherent limitations of voice recognition software  Read the chart carefully and recognize, using context, where substitutions have occurred

## 2022-02-10 NOTE — DISCHARGE SUMMARY
Discharge Summary - [facility]  Patient: Eitan Iyer 79 y o  female   MRN: [id number]  PCP: Pramod Hernandez MD  Unit/Bed#: [address] [place] [address] Encounter: [id number]            Discharging Physician / Practitioner: Amanda Churchill MD  PCP: Pramod Hernandez MD  Admission Date:   Admission Orders (From admission, onward)     Ordered        02/04/22 2045  Inpatient Admission  Once                      Discharge Date: 02/10/22      Reason for Admission:   Hip fracture      Discharge Diagnoses:     Principal Problem:    Right intertrochanteric femoral neck fracture    Active Problems:    Acute kidney injury superimposed on chronic kidney disease stage 3    Chronic combined systolic and diastolic CHF    Alzheimer's dementia    Hypernatremia    Postoperative blood loss anemia    Essential hypertension    Resolved Problems:    Leukocytosis      Consultations During Hospital Stay:  · Orthopedic surgery  · Nephrology  · Cardiology      Hospital Course:     * Femoral neck fracture  Assessment & Plan  S/p right femoral intertrochanteric nailing on 2/5 (POD #5 on day of discharge)  PRN pain control  Continue postoperative PT/OT -> plan for discharge to skilled rehab today  Continue postoperative DVT prophylaxis for 4 weeks (Lovenox)  See plan for blood-loss anemia below     Acute kidney injury superimposed on chronic kidney disease stage 3  Assessment & Plan  Baseline creatinine approximately 1 3-1 4 - currently improved @ 1 52  from a peak of 2 81   Discussed with nephrology -> can indefinitely stop Lasix and resume Entresto in one week  Renal/bladder ultrasound negative for hydronephrosis     Chronic combined systolic and diastolic CHF  Assessment & Plan  EF of 65% w/ mild biatrial dilatation,, mild-moderate TR, but no significant regional LV wall motion abnormalities  On beta-blockade with Toprol-XL - Entresto to resume in one week - diuretics indefinitely discontinued after discussion with nephrology     Alzheimer's dementia  Assessment & Plan  Continue Aricept/Seroquel/Zoloft     Hypernatremia  Assessment & Plan  Likely in the setting of low intravascular in the setting of decreased oral free water intake and chronic diuretic use  S/p a 500 mL bolus of D5W yesterday    Postoperative blood loss anemia  Assessment & Plan  Preoperative hemoglobin of 12 1 -> 10 4 -> 9 0 ->  A 0 8 today  Continue serial H/H at skilled facility and transfuse if necessary     Essential hypertension  Assessment & Plan  Continue Toprol-XL - Nifedipine added per nephrology     Leukocytosis  Assessment & Plan  Likely reactive due to acute medical issue(s)  Monitor WBC count - normalized      Condition at Discharge: fair       Discharge Day Visit / Exam:     Vitals: Blood Pressure: 153/54 (02/10/22 1030)  Pulse: 66 (02/10/22 1130)  Temperature: 98 4 °F (36 9 °C) (02/10/22 0810)  Temp Source: Axillary (02/10/22 0810)  Respirations: 18 (02/10/22 0810)  Height: 5' 8" (172 7 cm) (02/07/22 0743)  Weight - Scale: 68 6 kg (151 lb 3 8 oz) (02/09/22 0600)  SpO2: 96 % (02/10/22 1130)      Physical exam - I had a face-to-face encounter with the patient on day of discharge  Discussion with Patient and/or Family:  The patient has been advised to return to the ER immediately if any symptoms recur or worsen  Discharge instructions/Information to Patient and/or Family:   See after visit summary for information provided to patient and/or family  Provisions for Follow-Up Care:  See after visit summary for information related to follow-up care and any pertinent home health orders  Disposition:   Skilled rehab facility      Discharge Medications:  See after visit summary for reconciled discharge medications provided to patient and/or family  Discharge Statement:  I spent 38 minutes discharging the patient  This time was spent on the day of discharge  I had direct contact with the patient on the day of discharge  Greater than 50% of the total time was spent examining patient, answering all patient questions, arranging and discussing plan of care with patient as well as directly providing post-discharge instructions  Additional time then spent on discharge activities  ** Please Note: This note is constructed using a voice recognition dictation system  An occasional wrong word/phrase or sound-a-like substitution may have been picked up by dictation device due to the inherent limitations of voice recognition software  Read the chart carefully and recognize, using reasonable context, where substitutions may have occurred  **

## 2022-02-10 NOTE — CASE MANAGEMENT
Case Management Discharge Planning Note    Patient name Mehdi Acosta  Location [address] Sandi Rutledge MRN [id number]  : 1940 Date 2/10/2022       Current Admission Date: 2022  Current Admission Diagnosis:Femoral neck fracture   Patient Active Problem List    Diagnosis Date Noted    Hypernatremia 2022    Leukocytosis 2022    Acute kidney injury superimposed on chronic kidney disease stage 3 2022    Postoperative blood loss anemia 2022    Femoral neck fracture 2022    Chronic combined systolic and diastolic CHF     Stage 3a chronic kidney disease ([facility] [address] ) 2022    Essential hypertension 2022    Back pain     Medicare annual wellness visit, subsequent 2020    Alzheimer's dementia 2020      LOS (days): 6  Geometric Mean LOS (GMLOS) (days): 4 30  Days to GMLOS:-1 5     OBJECTIVE:  Risk of Unplanned Readmission Score: 23     Current admission status: Inpatient   Preferred Pharmacy:   [address] [place] [address]  Phone: [phone] Fax: [phone]    Primary Care Provider: Jayme Walsh MD    Primary Insurance: [facility]  Secondary Insurance:     DISCHARGE DETAILS:    Treatment Team Recommendation: Short Term Rehab  Discharge Destination Plan[de-identified] Short Term Rehab  Transport at Discharge : BLS Ambulance    Transported by [employer] and Unit #): Scott  ETA of Transport (Date): 02/10/22  ETA of Transport (Time): Abundio Marinelli Name, [facility] & State : [place], [place]  Receiving Facility/Agency Phone Number: [phone]

## 2022-02-10 NOTE — CASE MANAGEMENT
Case Management Discharge Planning Note    Patient name Alondra Yusuf  Location [address] [facility] [address] Sandi Rutledge MRN [id number]  : 1940 Date 2/10/2022       Current Admission Date: 2022  Current Admission Diagnosis:Femoral neck fracture   Patient Active Problem List    Diagnosis Date Noted    Hypernatremia 2022    Leukocytosis 2022    Acute kidney injury superimposed on chronic kidney disease stage 3 2022    Postoperative blood loss anemia 2022    Femoral neck fracture 2022    Chronic combined systolic and diastolic CHF     Stage 3a chronic kidney disease ([facility] [address] ) 2022    Essential hypertension 2022    Back pain     Medicare annual wellness visit, subsequent 2020    Alzheimer's dementia 2020      LOS (days): 6  Geometric Mean LOS (GMLOS) (days): 4 30  Days to GMLOS:-1 4     OBJECTIVE:  Risk of Unplanned Readmission Score: 23     Current admission status: Inpatient   Preferred Pharmacy:   [address] [place] [address]  Phone: [phone] Fax: [phone]    Primary Care Provider: Eve Umanzor MD    Primary Insurance: [address] [place] [address],[address] [place] [address]  Secondary Insurance:     DISCHARGE DETAILS:    Discharge planning discussed with[de-identified] cosmo Gaspar  Freedom of Choice: Yes  Comments - Freedom of Choice: SW following to assist with DCP  SW met with pt's daughter when she arrived to visit pt to discuss situation with [facility]  Daughter did receive call from facility informing her of issue  Daughter upset, tearful  Emotional support provided  Daughter aware that alternate facilities needed to be chosen  SW provided daughter with list of facilities to review  SW also talked with daughter about potential plans after rehab depending on pt's progress including possible need for LTC    Daughter chose to have referrals made to [facility], [facility], [facility] and [facility]  Offered ongoing support and assistance to daughter  SW will follow  CM contacted family/caregiver?: Yes  Were Treatment Team discharge recommendations reviewed with patient/caregiver?: Yes  Did patient/caregiver verbalize understanding of patient care needs?: Yes  Were patient/caregiver advised of the risks associated with not following Treatment Team discharge recommendations?: Yes    Contacts  Patient Contacts: Stacy Matt  Relationship to Patient[de-identified] Family  Contact Method:  In Person  Reason/Outcome: Discharge Planning,Continuity of Care    Other Referral/Resources/Interventions Provided:  Interventions: Short Term Rehab  Referral Comments: Additional referrals made to facilities chosen by daughter (listed above)    Treatment Team Recommendation: Short Term Rehab  Discharge Destination Plan[de-identified] Short Term Rehab  Transport at Discharge : BLS Ambulance    IMM Given (Date):: 02/09/22 (initial)

## 2022-02-10 NOTE — PLAN OF CARE
Problem: Nutrition/Hydration-ADULT  Goal: Nutrient/Hydration intake appropriate for improving, restoring or maintaining nutritional needs  Description: Monitor and assess patient's nutrition/hydration status for malnutrition  Collaborate with interdisciplinary team and initiate plan and interventions as ordered  Monitor patient's weight and dietary intake as ordered or per policy  Utilize nutrition screening tool and intervene as necessary  Determine patient's food preferences and provide high-protein, high-caloric foods as appropriate       INTERVENTIONS:  - Monitor oral intake, urinary output, labs, and treatment plans  - Assess nutrition and hydration status and recommend course of action  - Evaluate amount of meals eaten  - Assist patient with eating if necessary   - Allow adequate time for meals  - Recommend/ encourage appropriate diets, oral nutritional supplements, and vitamin/mineral supplements  - Order, calculate, and assess calorie counts as needed  - Recommend, monitor, and adjust tube feedings and TPN/PPN based on assessed needs  - Assess need for intravenous fluids  - Provide specific nutrition/hydration education as appropriate  - Include patient/family/caregiver in decisions related to nutrition  Outcome: Progressing     Problem: MUSCULOSKELETAL - ADULT  Goal: Maintain or return mobility to safest level of function  Description: INTERVENTIONS:  - Assess patient's ability to carry out ADLs; assess patient's baseline for ADL function and identify physical deficits which impact ability to perform ADLs (bathing, care of mouth/teeth, toileting, grooming, dressing, etc )  - Assess/evaluate cause of self-care deficits   - Assess range of motion  - Assess patient's mobility  - Assess patient's need for assistive devices and provide as appropriate  - Encourage maximum independence but intervene and supervise when necessary  - Involve family in performance of ADLs  - Assess for home care needs following discharge   - Consider OT consult to assist with ADL evaluation and planning for discharge  - Provide patient education as appropriate  Outcome: Progressing  Goal: Maintain proper alignment of affected body part  Description: INTERVENTIONS:  - Support, maintain and protect limb and body alignment  - Provide patient/ family with appropriate education  Outcome: Progressing     Problem: MOBILITY - ADULT  Goal: Maintain or return to baseline ADL function  Description: INTERVENTIONS:  -  Assess patient's ability to carry out ADLs; assess patient's baseline for ADL function and identify physical deficits which impact ability to perform ADLs (bathing, care of mouth/teeth, toileting, grooming, dressing, etc )  - Assess/evaluate cause of self-care deficits   - Assess range of motion  - Assess patient's mobility; develop plan if impaired  - Assess patient's need for assistive devices and provide as appropriate  - Encourage maximum independence but intervene and supervise when necessary  - Involve family in performance of ADLs  - Assess for home care needs following discharge   - Consider OT consult to assist with ADL evaluation and planning for discharge  - Provide patient education as appropriate  Outcome: Progressing     Problem: Potential for Falls  Goal: Patient will remain free of falls  Description: INTERVENTIONS:  - Educate patient/family on patient safety including physical limitations  - Instruct patient to call for assistance with activity   - Consult OT/PT to assist with strengthening/mobility   - Keep Call bell within reach  - Keep bed low and locked with side rails adjusted as appropriate  - Keep care items and personal belongings within reach  - Initiate and maintain comfort rounds  - Make Fall Risk Sign visible to staff  - Offer Toileting every 2 Hours, in advance of need  - Initiate/Maintain bed alarm  - Apply yellow socks and bracelet for high fall risk patients  - Consider moving patient to room near nurses station  Outcome: Progressing     Problem: Prexisting or High Potential for Compromised Skin Integrity  Goal: Skin integrity is maintained or improved  Description: INTERVENTIONS:  - Identify patients at risk for skin breakdown  - Assess and monitor skin integrity  - Assess and monitor nutrition and hydration status  - Monitor labs   - Assess for incontinence   - Turn and reposition patient  - Assist with mobility/ambulation  - Relieve pressure over bony prominences  - Avoid friction and shearing  - Provide appropriate hygiene as needed including keeping skin clean and dry  - Evaluate need for skin moisturizer/barrier cream  - Collaborate with interdisciplinary team   - Patient/family teaching  - Consider wound care consult   Outcome: Progressing

## 2022-02-10 NOTE — PROGRESS NOTES
Progress Note - Cardiology   [facility]     Shannon Zacarias 79 y o  female MRN: [id number]  : 1940  Unit/Bed#: 2 [place] [address] Encounter: [id number]    Assessment and Plan:   1  Acute intertrochanteric right femoral fracture:  POD#4 intertrochanteric nailing of right femoral fracture    -  care per Ortho and PT/OT     2  Acute kidney injury on Chronic kidney disease stage 3:  Resolved  Baseline creatinine appears to be around 1 3, creatinine peaked at 2 81 on  and is slowly trending down and today it is 1 8     -  patient was noted to be hypotensive poor oral intake on postop day    -  patient back to baseline and cleared by Nephrology to resume Entresto  3  Hypertension:  Postop hypotension resolved  -  will resume Entresto to 10/20/2020 to     4  Chronic combined systolic and diastolic heart failure:  Repeat limited echo pending to evaluate EF    -  EF on echo performed at [facility] in 2021 was 60-65%    -  repeat echocardiogram performed postop demonstrated EF of 65% with mild-to-moderate tricuspid valve regurgitation    -  will need close monitoring    -  continue Toprol XL 25 mg daily    -  resume Entresto 24/26 mg p o  B i d  On 02/10/2022    -  monitor I&O, daily weights and labs     5  Alzheimer's dementia:  Disoriented this morning, most likely due to anesthesia effect    -  will need close monitoring every orientation  Subjective / Objective:   Patient seen and examined  No complaints offered and she appears to be at baseline  Patient's creatinine has returned to base pt and she is eating and drinking well  Patient cleared to resume her Entresto 24/26 mg b i d     Vitals: Blood pressure (!) 178/68, pulse 58, temperature 98 4 °F (36 9 °C), temperature source Axillary, resp  rate 18, height 5' 8" (1 727 m), weight 68 6 kg (151 lb 3 8 oz), SpO2 95 %    Vitals:    22 0743 22 0600   Weight: 68 9 kg (152 lb) 68 6 kg (151 lb 3 8 oz)     Body mass index is 23 kg/m²  BP Readings from Last 3 Encounters:   02/10/22 (!) 178/68   02/04/22 161/74   11/05/20 116/68     Orthostatic Blood Pressures      Most Recent Value   Blood Pressure 178/68 filed at 02/10/2022 0810   Patient Position - Orthostatic VS Lying filed at 02/10/2022 0810        I/O       02/08 0701  02/09 0700 02/09 0701  02/10 0700 02/10 0701  02/11 0700    P  O   25     I V  (mL/kg)  10 (0 1) 10 (0 1)    Total Intake(mL/kg)  35 (0 5) 10 (0 1)    Urine (mL/kg/hr) 200 (0 1) 250 (0 2)     Stool       Total Output 200 250     Net -200 -215 +10               Invasive Devices  Report    Peripheral Intravenous Line            Peripheral IV 02/06/22 Distal;Dorsal (posterior); Right Forearm 3 days                  Intake/Output Summary (Last 24 hours) at 2/10/2022 1115  Last data filed at 2/10/2022 1001  Gross per 24 hour   Intake 45 ml   Output 250 ml   Net -205 ml         Physical Exam:   Physical Exam  Vitals and nursing note reviewed  Constitutional:       General: She is not in acute distress  Appearance: Normal appearance  She is normal weight  HENT:      Head: Normocephalic  Right Ear: External ear normal       Left Ear: External ear normal    Eyes:      General: No scleral icterus  Right eye: No discharge  Left eye: No discharge  Cardiovascular:      Rate and Rhythm: Normal rate and regular rhythm  Pulmonary:      Effort: Pulmonary effort is normal  No respiratory distress  Breath sounds: Normal breath sounds  No wheezing, rhonchi or rales  Abdominal:      General: Bowel sounds are normal  There is no distension  Palpations: Abdomen is soft  Musculoskeletal:      Right lower leg: No edema  Left lower leg: No edema  Skin:     General: Skin is warm and dry  Capillary Refill: Capillary refill takes less than 2 seconds  Neurological:      General: No focal deficit present  Mental Status: She is alert  Mental status is at baseline  Medications/ Allergies:     Current Facility-Administered Medications   Medication Dose Route Frequency Provider Last Rate    acetaminophen  650 mg Oral 4x Daily Tom Caballero DO      donepezil  10 mg Oral HS Barrera Saucer, PA-C      enoxaparin  30 mg Subcutaneous Q24H [address] Holly Florentino MD      hydrALAZINE  5 mg Intravenous Q4H PRN Barrera Saucer, PA-C      latanoprost  1 drop Both Eyes HS Barrera Saucer, PA-C      metoprolol succinate  25 mg Oral Daily Barrera Saucer, PA-C      NIFEdipine  30 mg Oral Daily Karissa Fish MD      ondansetron  4 mg Intravenous Q6H PRN Satya Ngo, INA      oxyCODONE  2 5 mg Oral Q4H PRN Barrera Saucer, PA-C      oxyCODONE  5 mg Oral Q4H PRN Barrera Saucer, PA-C      pantoprazole  40 mg Oral Early Morning Barrera Saucer, PA-C      QUEtiapine  25 mg Oral BID Barrera Saucer, PA-C      sacubitril-valsartan  1 tablet Oral BID INA Whitt      sertraline  100 mg Oral Daily Barrera Saucer, PA-C      timolol  1 drop Both Eyes BID Barrera Saucer, PA-C       hydrALAZINE, 5 mg, Q4H PRN  ondansetron, 4 mg, Q6H PRN  oxyCODONE, 2 5 mg, Q4H PRN  oxyCODONE, 5 mg, Q4H PRN      No Known Allergies    VTE Pharmacologic Prophylaxis:   Sequential compression device (Venodyne)     Labs:   Troponins:  Results from last 7 days   Lab Units 02/04/22  1005   CK TOTAL U/L 129 0   HS TNI RAND ng/L 17       CBC with diff:  Results from last 7 days   Lab Units 02/10/22  0534 02/09/22  0626 02/08/22  0515 02/07/22  0546 02/06/22  0517 02/05/22  0530 02/04/22  1005   WBC Thousand/uL 5 98 7 57 9 32 12 30* 9 09 9 67 14 00*   HEMOGLOBIN g/dL 8 8* 10 0* 9 0* 10 4* 10 4* 12 1 14 5   HEMATOCRIT % 27 7* 31 8* 28 8* 33 0* 32 2* 36 0 42 8   MCV fL 99* 98 97 97 96 92 89   PLATELETS Thousands/uL 232 248 211 204 195 178 221   MCH pg 31 5 30 9 30 4 30 6 30 9 30 9 30 3   MCHC g/dL 31 8 31 4 31 3* 31 5 32 3 33 6 33 9   RDW % 14 6 14 6 14 5 14 4 14 1 13 8 13 2   MPV fL 10 2 10 6 10 4 10 4 10 2 9 9 9 6   NRBC AUTO /100 WBCs 0 0  --   --   --   --  0     CMP:  Results from last 7 days   Lab Units 02/10/22  0534 02/09/22  0626 02/08/22  0515 02/07/22  0546 02/06/22  0517 02/05/22  0530 02/04/22  1005   SODIUM mmol/L 149* 150* 144 140 136 138 137   POTASSIUM mmol/L 4 4 4 5 4 3 4 9 4 1 4 0 3 5   CHLORIDE mmol/L 117* 115* 111* 107 105 105 101   CO2 mmol/L 26 24 23 22 25 25 21*   ANION GAP mmol/L 6 11 10 11 6 8 15*   BUN mg/dL 57* 74* 84* 75* 44* 32* 23*   CREATININE mg/dL 1 52* 1 81* 2 35* 2 81* 1 79* 1 51* 1 36*   CALCIUM mg/dL 8 5 9 1 8 4 8 6 8 1* 9 1 9 9   AST U/L  --   --  206* 155* 129*  --  97*   ALT U/L  --   --  24 17 21  --  18   ALK PHOS U/L  --   --  63 70 62  --  76 8   TOTAL PROTEIN g/dL  --   --  5 7* 6 3* 5 9*  --  7 8   ALBUMIN g/dL  --   --  2 3* 2 6* 2 6*  --  4 3   TOTAL BILIRUBIN mg/dL  --   --  0 86 1 08* 1 09*  --  1 07   EGFR ml/min/1 73sq m 31 25 18 15 26 31 36       Magnesium:  Results from last 7 days   Lab Units 02/10/22  0534 02/09/22  0626 02/05/22  0530   MAGNESIUM mg/dL 2 4 2 6 2 0     Coags:  Results from last 7 days   Lab Units 02/04/22  1004   INR  1 03       Imaging & Testing   I have personally reviewed pertinent reports  XR chest portable    Result Date: 2/4/2022  Narrative: CHEST INDICATION:   fall  COMPARISON:  None EXAM PERFORMED/VIEWS:  XR CHEST PORTABLE FINDINGS: Cardiomediastinal silhouette appears unremarkable  The lungs are clear  No pneumothorax or pleural effusion  Degenerative change of the shoulders  Impression: No acute cardiopulmonary disease  Workstation performed: [id number]     XR hip/pelv 2-3 vws right if performed    Result Date: 2/7/2022  Narrative: C-ARM - right hip INDICATION: orif  Procedure guidance  COMPARISON:  Right hip radiographs 2/4/2022 TECHNIQUE: FLUOROSCOPY TIME:   121 5 seconds 2 FLUOROSCOPIC IMAGES FINDINGS: Fluoroscopic guidance provided for procedure guidance   ORIF of right femoral intertrochanteric fracture  Osseous and soft tissue detail limited by technique  Impression: Fluoroscopic guidance provided for procedure guidance  Please refer to the separate procedure notes for additional details  Workstation performed: [id number]     XR hip/pelv 2-3 vws right    Result Date: 2/4/2022  Narrative: RIGHT HIP AND RIGHT Lisseth Northern femur INDICATION:   fall  COMPARISON:  Radiographs of the abdomen April 30, 2018  VIEWS:  XR HIP/PELV 2-3 VWS RIGHT W PELVIS IF PERFORMED, XR FEMUR 2 VW RIGHT FINDINGS: Acute intertrochanteric fracture of the right femoral neck with involvement of the greater and lesser trochanters and foreshortening of the femoral shaft  Irregularity of the superior and inferior left pubic rami  Evaluation of the sacrum is limited due to overlying bowel gas and stool  Mild bilateral hip osteoarthrosis  No lytic or blastic osseous lesion  Soft tissues are unremarkable  Impression: 1  Acute intertrochanteric right femoral neck fracture  2   Irregularities of the left superior and inferior pubic rami likely reflects sequelae of prior injury  Correlation with tenderness at this site is advised to assess for acute injury  Workstation performed: [id number]     XR femur 2 views RIGHT    Result Date: 2/4/2022  Narrative: RIGHT HIP AND RIGHT Lisseth Northern femur INDICATION:   fall  COMPARISON:  Radiographs of the abdomen April 30, 2018  VIEWS:  XR HIP/PELV 2-3 VWS RIGHT W PELVIS IF PERFORMED, XR FEMUR 2 VW RIGHT FINDINGS: Acute intertrochanteric fracture of the right femoral neck with involvement of the greater and lesser trochanters and foreshortening of the femoral shaft  Irregularity of the superior and inferior left pubic rami  Evaluation of the sacrum is limited due to overlying bowel gas and stool  Mild bilateral hip osteoarthrosis  No lytic or blastic osseous lesion  Soft tissues are unremarkable  Impression: 1  Acute intertrochanteric right femoral neck fracture   2   Irregularities of the left superior and inferior pubic rami likely reflects sequelae of prior injury  Correlation with tenderness at this site is advised to assess for acute injury  Workstation performed: [id number]     XR knee 1 or 2 vw right    Result Date: 2/9/2022  Narrative: RIGHT KNEE INDICATION:   pain and swelling  COMPARISON:  None VIEWS:  XR KNEE 1 OR 2 VW RIGHT FINDINGS: There is no acute fracture or dislocation  There is no joint effusion  Patellar enthesopathy  No lytic or blastic osseous lesion  Soft tissues are unremarkable  Impression: No acute osseous abnormality  Workstation performed: [id number]     CT head wo contrast    Result Date: 2/4/2022  Narrative: CT BRAIN - WITHOUT CONTRAST INDICATION:   Fall yesterday    COMPARISON:  None  TECHNIQUE:  CT examination of the brain was performed  In addition to axial images, sagittal and coronal 2D reformatted images were created and submitted for interpretation  Radiation dose length product (DLP) for this visit:  800 mGy-cm   This examination, like all CT scans performed in the [facility], was performed utilizing techniques to minimize radiation dose exposure, including the use of iterative reconstruction and automated exposure control  IMAGE QUALITY:  Diagnostic  FINDINGS: PARENCHYMA: Decreased attenuation is noted in periventricular and subcortical white matter demonstrating an appearance that is statistically most likely to represent mild microangiopathic change  Chronic lacunar infarction(s) are noted in basal ganglia/thalami  No CT signs of acute infarction  No intracranial mass, mass effect or midline shift  No acute parenchymal hemorrhage  VENTRICLES AND EXTRA-AXIAL SPACES:  Normal for the patient's age  VISUALIZED ORBITS AND PARANASAL SINUSES:  Unremarkable  CALVARIUM AND EXTRACRANIAL SOFT TISSUES:  Normal      Impression: No acute intracranial abnormality   Workstation performed: [id number]     US kidney and bladder    Result Date: 2/8/2022  Narrative: RENAL ULTRASOUND INDICATION:   DUARTE  COMPARISON: None TECHNIQUE:   Ultrasound of the retroperitoneum was performed with a curvilinear transducer utilizing volumetric sweeps and still imaging techniques  FINDINGS: Evaluation of the left kidney was limited by bowel gas  KIDNEYS: The left kidney measures small though this may be foreshortened  Right kidney:  10 3 x 4 8 x 5 5 cm  Left kidney:  6 7 x 2 9 x 3 2 cm  Right kidney Normal echogenicity and contour  No suspicious masses detected  There is a right lower pole simple cyst measuring 2 3 x 2 1 x 2 1 cm  There is a midpole minimally septated cyst measuring 1 5 x 1 4 x 1 4 cm  No hydronephrosis  No shadowing calculi  No perinephric fluid collections  Left kidney Normal echogenicity and contour  No suspicious masses detected  There is a left lower pole simple cyst measuring 1 4 x 1 4 x 1 5 cm  No hydronephrosis  No shadowing calculi  No perinephric fluid collections  URETERS: Nonvisualized  BLADDER: Normally distended  No focal thickening or mass lesions  Incidentally noted is cholelithiasis  Impression: 1  No hydronephrosis 2  Limited evaluation of the left kidney which may be atrophic versus foreshortening  3   Simple and minimally complex renal cysts  4   Cholelithiasis  Workstation performed: [id number]     Echo follow up/limited w/ contrast if indicated    Result Date: 2/8/2022  Narrative: [facility]  Left Ventricle: Left ventricular cavity size is normal  Wall thickness is mildly increased  The left ventricular ejection fraction is 65%  Systolic function is normal  Wall motion is normal  Diastolic function is mildly abnormal, consistent with grade I (abnormal) relaxation    Left Atrium: The atrium is mildly dilated    Right Atrium: The atrium is mildly dilated    Tricuspid Valve: There is mild to moderate regurgitation  The right ventricular systolic pressure is moderately elevated    Prior TTE study available for comparison  Prior study date: 11/29/2021   No significant changes noted compared to the prior study  Deya Toth  Cardiology      "This note has been constructed using a voice recognition system  Therefore there may be syntax, spelling, and/or grammatical errors   Please call if you have any questions  "

## 2022-02-14 ENCOUNTER — TELEPHONE (OUTPATIENT)
Dept: NEPHROLOGY | Facility: CLINIC | Age: 82
End: 2022-02-14

## 2022-02-14 DIAGNOSIS — N18.9 ACUTE KIDNEY INJURY SUPERIMPOSED ON CKD (HCC): Primary | ICD-10-CM

## 2022-02-14 DIAGNOSIS — N17.9 ACUTE KIDNEY INJURY SUPERIMPOSED ON CKD (HCC): Primary | ICD-10-CM

## 2022-02-14 NOTE — UTILIZATION REVIEW
Notification of Discharge   This is a Notification of Discharge from our facility [address]  Please be advised that this patient has been discharge from our facility  Below you will find the admission and discharge date and time including the patients disposition  UTILIZATION REVIEW CONTACT:  Dipak Enriquez  Utilization   Network Utilization Review Department  Phone: [phone] x carefully listen to the prompts  All voicemails are confidential   Email: [email or web address]     PHYSICIAN ADVISORY SERVICES:  FOR MJHT-GR-LAHG REVIEW - MEDICAL NECESSITY DENIAL  Phone: [phone]  Fax: [phone]  Email: [email or web address]     PRESENTATION DATE: 2/4/2022  8:33 PM  OBERVATION ADMISSION DATE:   INPATIENT ADMISSION DATE: 2/4/22  8:33 PM   DISCHARGE DATE: 2/10/2022  5:00 PM  DISPOSITION: Non [facility] SN[facility]/TCU/SNU Non [facility] SNF/TCU/SNU      IMPORTANT INFORMATION:  Send all requests for admission clinical reviews, approved or denied determinations and any other requests to dedicated fax number below belonging to the campus where the patient is receiving treatment   List of dedicated fax numbers:  [address] DENIALS (Administrative/Medical Necessity) [phone]   [address] (Maternity/NICU/Pediatrics) [phone]   Ailyn Fines [phone]   Chantale Oms [phone]   Tiny Nauruan [phone]   [address] [phone]   [facility]  [phone]   [address] [place] [address], [place] [id number] [facility] 1000 [facility] [phone]

## 2022-02-14 NOTE — TELEPHONE ENCOUNTER
----- Message from Pastor Vila MD sent at 2/10/2022  7:07 PM EST -----  Hi,     Ms Rosario Howard has been discharge from [facility] , she will need f/u for DUARTE within 10-12 days of discharge with BMP    Thanks    Nate Michaels

## 2022-02-18 ENCOUNTER — TELEPHONE (OUTPATIENT)
Dept: OTHER | Facility: OTHER | Age: 82
End: 2022-02-18

## 2022-02-19 NOTE — TELEPHONE ENCOUNTER
Asha Henry from [facility] called to advise that the patient has been admitted to hospice  He can be reached at [phone]

## 2022-02-21 ENCOUNTER — TELEPHONE (OUTPATIENT)
Dept: FAMILY MEDICINE CLINIC | Facility: CLINIC | Age: 82
End: 2022-02-21

## 2022-02-21 NOTE — TELEPHONE ENCOUNTER
Yadira Caldera from [employer] called, he said the PRN Morphine 10mg every 4 hrs is not working well enough, he is wondering if frequency or strengh can be increased? Aly Das # [phone])

## 2022-02-28 ENCOUNTER — TELEPHONE (OUTPATIENT)
Dept: NEPHROLOGY | Facility: CLINIC | Age: 82
End: 2022-02-28

## 2022-02-28 DIAGNOSIS — G30.9 ALZHEIMER'S DEMENTIA WITHOUT BEHAVIORAL DISTURBANCE, UNSPECIFIED TIMING OF DEMENTIA ONSET (HCC): Primary | ICD-10-CM

## 2022-02-28 DIAGNOSIS — F02.80 ALZHEIMER'S DEMENTIA WITHOUT BEHAVIORAL DISTURBANCE, UNSPECIFIED TIMING OF DEMENTIA ONSET (HCC): Primary | ICD-10-CM

## 2022-02-28 RX ORDER — LORAZEPAM 2 MG/ML
1 CONCENTRATE ORAL EVERY 8 HOURS PRN
Qty: 30 ML | Refills: 3 | Status: SHIPPED | OUTPATIENT
Start: 2022-02-28

## 2022-02-28 RX ORDER — MORPHINE SULFATE 100 MG/5ML
SOLUTION, CONCENTRATE ORAL
Qty: 240 ML | Refills: 0 | Status: SHIPPED | OUTPATIENT
Start: 2022-02-28

## 2022-02-28 NOTE — TELEPHONE ENCOUNTER
COVID Screening   When was screening done? confirmation   Does patient have any symptoms? no    Was patient told to let us know if they develop symptoms? yes    Patient voiced understanding   Appointment Confirmation   Person confirmed appointment with  If not patient, name of the person Child    Date and time of appointment 3/1     11:10   Patient acknowledged and will be at appointment? yes    Did you advise the patient that they will need a urine sample if they are a new patient?  N/A    Did you advise the patient to bring their current medications for verification? (including any OTC) Yes    Additional Information

## 2022-09-06 NOTE — OCCUPATIONAL THERAPY NOTE
OT TREATMENT     02/09/22 1330   Note Type   Note Type Treatment   Restrictions/Precautions   RLE Weight Bearing Per Order WBAT   Other Precautions Cognitive; Chair Alarm; Bed Alarm; Fall Risk;Pain   Pain Assessment   Pain Assessment Tool Corey-Baker FACES   Corey-Baker FACES Pain Rating 6   Pain Location/Orientation Orientation: Right;Location: Hip  (with activity )   ADL   Grooming Assistance 5  Supervision/Setup   Grooming Deficit Setup   Grooming Comments seated in recliner chair with setup  Patient more awake once seated in chair, attempted supine and pt requires max assist    Bed Mobility   Supine to Sit 2  Maximal assistance   Additional items Assist x 2;Verbal cues   Transfers   Sit to Stand 2  Maximal assistance   Additional items Assist x 2;Verbal cues   Stand to Sit 2  Maximal assistance   Additional items Assist x 2;Verbal cues   Stand pivot 2  Maximal assistance   Additional items Assist x 2;Verbal cues  (bed to chair with hand hold assist )   ROM- Right Upper Extremities   R Elbow AAROM;Elbow flexion;Elbow extension   R Hand AROM; Thumb; Index finger; Long finger;Ring finger;Little finger   R Weight/Reps/Sets 10 times each supine   ROM - Left Upper Extremities    L Elbow AAROM;Elbow flexion;Elbow extension   L Hand AROM; Thumb; Index finger; Long finger;Ring finger;Little finger   L Weight/Reps/Sets 10 times each supine   Cognition   Overall Cognitive Status Impaired   Arousal/Participation Cooperative   Attention Attends with cues to redirect   Orientation Level Oriented to person   Following Commands Follows one step commands with increased time or repetition   Comments cosmo Hoyos present for session  Patient initially lethargic but more alert once seated on edge of bed    Assessment   Assessment Patient seen for OT treatment  Patient requires max assist of 2 for transfers  Initially lethargic but improved once seated edge of bed  Patient then able to wash face with setup    Patients sitting balance improved to poor+ today  Patient will benefit from continued OT services to maximize functional performance with ADLS  The patient's raw score on the AM-PAC Daily Activity inpatient short form low function score is 14, standardized score is Low Function Daily Activity Standardized Score: 24 79  Patients with a standardized score less than 39 4 are likely to benefit from discharge to post-acute rehab services  Please refer to the recommendation of the Occupational Therapist for safe discharge planning  Plan   Treatment Interventions ADL retraining;Functional transfer training;UE strengthening/ROM; Endurance training; Activityengagement; Compensatory technique education;Cognitive reorientation;Patient/family training   OT Frequency 5x/wk   Recommendation   OT Discharge Recommendation Post acute rehabilitation services   AM-[facility] Daily Activity Inpatient   Lower Body Dressing 1   Bathing 1   Toileting 1   Upper Body Dressing 1   Grooming 2   Eating 2   Daily Activity Raw Score 8   Turning Head Towards Sound 3   Follow Simple Instructions 3   Low Function Daily Activity Raw Score 14   Low Function Daily Activity Standardized Score 24 79   AM-PAC Applied Cognition Inpatient   Following a Speech/Presentation 2   Understanding Ordinary Conversation 2   Taking Medications 1   Remembering Where Things Are Placed or Put Away 1   Remembering List of 4-5 Errands 1   Taking Care of Complicated Tasks 1   Applied Cognition Raw Score 8   Applied Cognition Standardized Score 03 80   Licensure   [place] License Number  [place] Gerald 86 OTR/L [id number] [Takes medication as prescribed] : takes [None] : Patient does not have any barriers to medication adherence [No] : Did not review medication list for presence of high-risk medications.

## 2025-04-29 NOTE — ASSESSMENT & PLAN NOTE
· Right intertrochanteric femur fracture status post right femoral intertrochanteric nailing POD #2  · Will schedule tylenol    Oxycodone for severe/breakthrough pain  · Therapy recommending rehab  · WBAT RLE   · Will need 4 weeks post-op DVT ppx No unknown

## (undated) DEVICE — HEAVY DUTY TABLE COVER: Brand: CONVERTORS

## (undated) DEVICE — INTENDED FOR TISSUE SEPARATION, AND OTHER PROCEDURES THAT REQUIRE A SHARP SURGICAL BLADE TO PUNCTURE OR CUT.: Brand: BARD-PARKER SAFETY BLADES SIZE 15, STERILE

## (undated) DEVICE — DRESSING MEPILEX AG BORDER 4 X 4 IN

## (undated) DEVICE — FABRIC REINFORCED, SURGICAL GOWN, XL: Brand: CONVERTORS

## (undated) DEVICE — PACK GENERAL LF

## (undated) DEVICE — CHLORAPREP HI-LITE 26ML ORANGE

## (undated) DEVICE — ANTIBACTERIAL VIOLET BRAIDED (POLYGLACTIN 910), SYNTHETIC ABSORBABLE SURGICAL SUTURE: Brand: COATED VICRYL

## (undated) DEVICE — 3.2MM GUIDE WIRE 400MM

## (undated) DEVICE — ADHESIVE SKIN HIGH VISCOSITY EXOFIN 1ML

## (undated) DEVICE — GLOVE INDICATOR PI UNDERGLOVE SZ 6.5 BLUE

## (undated) DEVICE — REPEL CUT REST SURGICAL GLV LNRS LG: Brand: REPEL

## (undated) DEVICE — 4.2MM THREE-FLUTED DRILL BIT QC/330MM/100MM CALIBRATION

## (undated) DEVICE — BASIC DOUBLE BASIN 2-LF: Brand: MEDLINE INDUSTRIES, INC.

## (undated) DEVICE — ANTIBACTERIAL UNDYED BRAIDED (POLYGLACTIN 910), SYNTHETIC ABSORBABLE SUTURE: Brand: COATED VICRYL

## (undated) DEVICE — SPONGE LAP 18 X 18 IN

## (undated) DEVICE — GLOVE INDICATOR PI UNDERGLOVE SZ 7.5 BLUE

## (undated) DEVICE — GLOVE SRG BIOGEL 6.5

## (undated) DEVICE — DRESSING MEPILEX AG BORDER 3 X 3 IN

## (undated) DEVICE — DRESSING MEPILEX AG BORDER 4 X 8 IN

## (undated) DEVICE — DRAPE ISOLATION

## (undated) DEVICE — GLOVE SRG BIOGEL 7.5